# Patient Record
Sex: MALE | Race: WHITE | Employment: OTHER
[De-identification: names, ages, dates, MRNs, and addresses within clinical notes are randomized per-mention and may not be internally consistent; named-entity substitution may affect disease eponyms.]

---

## 2017-01-06 ENCOUNTER — OFFICE VISIT (OUTPATIENT)
Dept: INTERNAL MEDICINE | Facility: CLINIC | Age: 82
End: 2017-01-06

## 2017-01-06 VITALS
OXYGEN SATURATION: 98 % | TEMPERATURE: 98 F | HEART RATE: 63 BPM | SYSTOLIC BLOOD PRESSURE: 140 MMHG | RESPIRATION RATE: 16 BRPM | DIASTOLIC BLOOD PRESSURE: 60 MMHG | HEIGHT: 70 IN | WEIGHT: 218.6 LBS | BODY MASS INDEX: 31.3 KG/M2

## 2017-01-06 DIAGNOSIS — E78.5 HYPERLIPIDEMIA, UNSPECIFIED HYPERLIPIDEMIA TYPE: ICD-10-CM

## 2017-01-06 DIAGNOSIS — M15.9 GENERALIZED OA: ICD-10-CM

## 2017-01-06 DIAGNOSIS — G89.4 CHRONIC PAIN SYNDROME: ICD-10-CM

## 2017-01-06 DIAGNOSIS — I10 ESSENTIAL HYPERTENSION: Primary | ICD-10-CM

## 2017-01-06 DIAGNOSIS — I25.10 CORONARY ARTERY DISEASE INVOLVING NATIVE CORONARY ARTERY OF NATIVE HEART WITHOUT ANGINA PECTORIS: ICD-10-CM

## 2017-01-06 PROCEDURE — 99214 OFFICE O/P EST MOD 30 MIN: CPT | Performed by: FAMILY MEDICINE

## 2017-01-06 RX ORDER — CLOPIDOGREL BISULFATE 75 MG/1
75 TABLET ORAL DAILY
COMMUNITY

## 2017-01-06 ASSESSMENT — ENCOUNTER SYMPTOMS
ALLERGIC/IMMUNOLOGIC NEGATIVE: 1
RESPIRATORY NEGATIVE: 1
EYES NEGATIVE: 1

## 2017-01-25 ENCOUNTER — OFFICE VISIT (OUTPATIENT)
Dept: GASTROENTEROLOGY | Facility: CLINIC | Age: 82
End: 2017-01-25

## 2017-01-25 VITALS
SYSTOLIC BLOOD PRESSURE: 147 MMHG | HEIGHT: 71 IN | OXYGEN SATURATION: 94 % | RESPIRATION RATE: 14 BRPM | TEMPERATURE: 97.5 F | WEIGHT: 218.8 LBS | HEART RATE: 62 BPM | BODY MASS INDEX: 30.63 KG/M2 | DIASTOLIC BLOOD PRESSURE: 67 MMHG

## 2017-01-25 DIAGNOSIS — K22.719 BARRETT'S ESOPHAGUS WITH DYSPLASIA: ICD-10-CM

## 2017-01-25 DIAGNOSIS — K27.9 PUD (PEPTIC ULCER DISEASE): Primary | ICD-10-CM

## 2017-01-25 DIAGNOSIS — E66.01 MORBID OBESITY, UNSPECIFIED OBESITY TYPE (HCC): ICD-10-CM

## 2017-01-25 PROBLEM — K22.70 BARRETT'S ESOPHAGUS: Status: ACTIVE | Noted: 2017-01-25

## 2017-01-25 PROCEDURE — 99214 OFFICE O/P EST MOD 30 MIN: CPT | Performed by: INTERNAL MEDICINE

## 2017-01-25 PROCEDURE — 4040F PNEUMOC VAC/ADMIN/RCVD: CPT | Performed by: INTERNAL MEDICINE

## 2017-01-25 PROCEDURE — G8419 CALC BMI OUT NRM PARAM NOF/U: HCPCS | Performed by: INTERNAL MEDICINE

## 2017-01-25 PROCEDURE — G8427 DOCREV CUR MEDS BY ELIG CLIN: HCPCS | Performed by: INTERNAL MEDICINE

## 2017-01-25 PROCEDURE — 1123F ACP DISCUSS/DSCN MKR DOCD: CPT | Performed by: INTERNAL MEDICINE

## 2017-01-25 PROCEDURE — G8484 FLU IMMUNIZE NO ADMIN: HCPCS | Performed by: INTERNAL MEDICINE

## 2017-01-25 PROCEDURE — 1036F TOBACCO NON-USER: CPT | Performed by: INTERNAL MEDICINE

## 2017-01-25 PROCEDURE — G8598 ASA/ANTIPLAT THER USED: HCPCS | Performed by: INTERNAL MEDICINE

## 2017-01-25 ASSESSMENT — ENCOUNTER SYMPTOMS
VOMITING: 0
BLOOD IN STOOL: 0
CONSTIPATION: 0
NAUSEA: 0
ANAL BLEEDING: 0
ABDOMINAL DISTENTION: 0
ABDOMINAL PAIN: 1
DIARRHEA: 0
SHORTNESS OF BREATH: 1
BACK PAIN: 1
RECTAL PAIN: 0

## 2017-03-03 RX ORDER — GABAPENTIN 100 MG/1
CAPSULE ORAL
Qty: 90 CAPSULE | Refills: 2 | Status: SHIPPED | OUTPATIENT
Start: 2017-03-03 | End: 2017-07-24 | Stop reason: ALTCHOICE

## 2017-04-11 ENCOUNTER — HOSPITAL ENCOUNTER (OUTPATIENT)
Age: 82
Discharge: HOME | End: 2017-04-11
Payer: MEDICARE

## 2017-04-21 ENCOUNTER — OFFICE VISIT (OUTPATIENT)
Dept: INTERNAL MEDICINE CLINIC | Age: 82
End: 2017-04-21
Payer: MEDICARE

## 2017-04-21 VITALS
DIASTOLIC BLOOD PRESSURE: 64 MMHG | HEART RATE: 63 BPM | WEIGHT: 227.6 LBS | OXYGEN SATURATION: 93 % | TEMPERATURE: 98.6 F | SYSTOLIC BLOOD PRESSURE: 140 MMHG | BODY MASS INDEX: 31.86 KG/M2 | HEIGHT: 71 IN | RESPIRATION RATE: 16 BRPM

## 2017-04-21 DIAGNOSIS — I10 ESSENTIAL HYPERTENSION: ICD-10-CM

## 2017-04-21 DIAGNOSIS — M51.26 LUMBAR DISCOGENIC PAIN SYNDROME: ICD-10-CM

## 2017-04-21 DIAGNOSIS — M19.042 PRIMARY OSTEOARTHRITIS OF BOTH HANDS: ICD-10-CM

## 2017-04-21 DIAGNOSIS — M54.10 RADICULOPATHY AFFECTING UPPER EXTREMITY: ICD-10-CM

## 2017-04-21 DIAGNOSIS — M96.1 POSTLAMINECTOMY SYNDROME: ICD-10-CM

## 2017-04-21 DIAGNOSIS — M54.2 DISCOGENIC CERVICAL PAIN: Primary | ICD-10-CM

## 2017-04-21 DIAGNOSIS — M19.041 PRIMARY OSTEOARTHRITIS OF BOTH HANDS: ICD-10-CM

## 2017-04-21 DIAGNOSIS — N18.31 CHRONIC KIDNEY DISEASE (CKD) STAGE G3A/A1, MODERATELY DECREASED GLOMERULAR FILTRATION RATE (GFR) BETWEEN 45-59 ML/MIN/1.73 SQUARE METER AND ALBUMINURIA CREATININE RATIO LESS THAN 30 MG/G (HCC): ICD-10-CM

## 2017-04-21 DIAGNOSIS — K27.9 PUD (PEPTIC ULCER DISEASE): ICD-10-CM

## 2017-04-21 DIAGNOSIS — D52.8 OTHER FOLATE DEFICIENCY ANEMIAS: ICD-10-CM

## 2017-04-21 PROCEDURE — 1036F TOBACCO NON-USER: CPT | Performed by: FAMILY MEDICINE

## 2017-04-21 PROCEDURE — G8427 DOCREV CUR MEDS BY ELIG CLIN: HCPCS | Performed by: FAMILY MEDICINE

## 2017-04-21 PROCEDURE — G8417 CALC BMI ABV UP PARAM F/U: HCPCS | Performed by: FAMILY MEDICINE

## 2017-04-21 PROCEDURE — G8598 ASA/ANTIPLAT THER USED: HCPCS | Performed by: FAMILY MEDICINE

## 2017-04-21 PROCEDURE — 4040F PNEUMOC VAC/ADMIN/RCVD: CPT | Performed by: FAMILY MEDICINE

## 2017-04-21 PROCEDURE — 99214 OFFICE O/P EST MOD 30 MIN: CPT | Performed by: FAMILY MEDICINE

## 2017-04-21 PROCEDURE — 1123F ACP DISCUSS/DSCN MKR DOCD: CPT | Performed by: FAMILY MEDICINE

## 2017-04-21 ASSESSMENT — PATIENT HEALTH QUESTIONNAIRE - PHQ9
1. LITTLE INTEREST OR PLEASURE IN DOING THINGS: 0
SUM OF ALL RESPONSES TO PHQ QUESTIONS 1-9: 0
SUM OF ALL RESPONSES TO PHQ9 QUESTIONS 1 & 2: 0
2. FEELING DOWN, DEPRESSED OR HOPELESS: 0

## 2017-04-21 ASSESSMENT — ENCOUNTER SYMPTOMS
ALLERGIC/IMMUNOLOGIC NEGATIVE: 1
EYES NEGATIVE: 1
RESPIRATORY NEGATIVE: 1

## 2017-04-25 ENCOUNTER — HOSPITAL ENCOUNTER (OUTPATIENT)
Age: 82
Discharge: HOME | End: 2017-04-25
Payer: MEDICARE

## 2017-07-03 RX ORDER — OMEPRAZOLE 20 MG/1
20 CAPSULE, DELAYED RELEASE ORAL DAILY
Qty: 90 CAPSULE | Refills: 0 | Status: SHIPPED | OUTPATIENT
Start: 2017-07-03 | End: 2017-10-02 | Stop reason: SDUPTHER

## 2017-07-12 ENCOUNTER — HOSPITAL ENCOUNTER (EMERGENCY)
Age: 82
Discharge: HOME | End: 2017-07-12
Attending: EMERGENCY MEDICINE
Payer: MEDICARE

## 2017-07-12 VITALS
HEART RATE: 61 BPM | WEIGHT: 224 LBS | OXYGEN SATURATION: 96 % | TEMPERATURE: 98.1 F | DIASTOLIC BLOOD PRESSURE: 60 MMHG | BODY MASS INDEX: 32.07 KG/M2 | HEIGHT: 70 IN | RESPIRATION RATE: 14 BRPM | SYSTOLIC BLOOD PRESSURE: 143 MMHG

## 2017-07-12 DIAGNOSIS — H72.92 TYMPANIC MEMBRANE PERFORATION, LEFT: Primary | ICD-10-CM

## 2017-07-12 PROCEDURE — 6370000000 HC RX 637 (ALT 250 FOR IP): Performed by: EMERGENCY MEDICINE

## 2017-07-12 PROCEDURE — 99282 EMERGENCY DEPT VISIT SF MDM: CPT

## 2017-07-12 RX ORDER — HYDROCODONE BITARTRATE AND ACETAMINOPHEN 5; 325 MG/1; MG/1
1 TABLET ORAL ONCE
Status: COMPLETED | OUTPATIENT
Start: 2017-07-12 | End: 2017-07-12

## 2017-07-12 RX ORDER — CLINDAMYCIN HYDROCHLORIDE 300 MG/1
300 CAPSULE ORAL 3 TIMES DAILY
Qty: 21 CAPSULE | Refills: 0 | Status: SHIPPED | OUTPATIENT
Start: 2017-07-12 | End: 2017-07-19

## 2017-07-12 RX ORDER — HYDROCODONE BITARTRATE AND ACETAMINOPHEN 5; 325 MG/1; MG/1
1 TABLET ORAL EVERY 6 HOURS PRN
Qty: 20 TABLET | Refills: 0 | Status: SHIPPED | OUTPATIENT
Start: 2017-07-12 | End: 2017-07-19

## 2017-07-12 RX ORDER — CLINDAMYCIN HYDROCHLORIDE 150 MG/1
300 CAPSULE ORAL ONCE
Status: COMPLETED | OUTPATIENT
Start: 2017-07-12 | End: 2017-07-12

## 2017-07-12 RX ADMIN — CLINDAMYCIN HYDROCHLORIDE 300 MG: 150 CAPSULE ORAL at 04:17

## 2017-07-12 RX ADMIN — HYDROCODONE BITARTRATE AND ACETAMINOPHEN 1 TABLET: 5; 325 TABLET ORAL at 04:17

## 2017-07-12 ASSESSMENT — PAIN DESCRIPTION - LOCATION: LOCATION: EAR

## 2017-07-12 ASSESSMENT — PAIN SCALES - GENERAL: PAINLEVEL_OUTOF10: 3

## 2017-07-12 ASSESSMENT — PAIN DESCRIPTION - ORIENTATION: ORIENTATION: RIGHT

## 2017-07-12 ASSESSMENT — PAIN DESCRIPTION - PAIN TYPE: TYPE: ACUTE PAIN

## 2017-07-13 ENCOUNTER — CARE COORDINATION (OUTPATIENT)
Dept: INTERNAL MEDICINE CLINIC | Age: 82
End: 2017-07-13

## 2017-07-24 ENCOUNTER — OFFICE VISIT (OUTPATIENT)
Dept: INTERNAL MEDICINE CLINIC | Age: 82
End: 2017-07-24
Payer: MEDICARE

## 2017-07-24 VITALS
HEIGHT: 70 IN | OXYGEN SATURATION: 98 % | SYSTOLIC BLOOD PRESSURE: 130 MMHG | TEMPERATURE: 98 F | BODY MASS INDEX: 32.18 KG/M2 | DIASTOLIC BLOOD PRESSURE: 70 MMHG | HEART RATE: 75 BPM | WEIGHT: 224.8 LBS | RESPIRATION RATE: 17 BRPM

## 2017-07-24 DIAGNOSIS — E78.49 OTHER HYPERLIPIDEMIA: ICD-10-CM

## 2017-07-24 DIAGNOSIS — H61.22 HEARING LOSS DUE TO CERUMEN IMPACTION, LEFT: ICD-10-CM

## 2017-07-24 DIAGNOSIS — K22.719 BARRETT'S ESOPHAGUS WITH DYSPLASIA: ICD-10-CM

## 2017-07-24 DIAGNOSIS — H61.102: ICD-10-CM

## 2017-07-24 DIAGNOSIS — N18.31 CHRONIC KIDNEY DISEASE (CKD) STAGE G3A/A1, MODERATELY DECREASED GLOMERULAR FILTRATION RATE (GFR) BETWEEN 45-59 ML/MIN/1.73 SQUARE METER AND ALBUMINURIA CREATININE RATIO LESS THAN 30 MG/G (HCC): ICD-10-CM

## 2017-07-24 DIAGNOSIS — M50.20 CERVICAL DISCOGENIC PAIN SYNDROME: ICD-10-CM

## 2017-07-24 DIAGNOSIS — I25.10 CORONARY ARTERY DISEASE INVOLVING NATIVE CORONARY ARTERY OF NATIVE HEART WITHOUT ANGINA PECTORIS: ICD-10-CM

## 2017-07-24 DIAGNOSIS — I10 ESSENTIAL HYPERTENSION: Primary | ICD-10-CM

## 2017-07-24 PROBLEM — M50.30 CERVICAL DISCOGENIC PAIN SYNDROME: Status: ACTIVE | Noted: 2017-07-24

## 2017-07-24 PROCEDURE — G8427 DOCREV CUR MEDS BY ELIG CLIN: HCPCS | Performed by: FAMILY MEDICINE

## 2017-07-24 PROCEDURE — 1036F TOBACCO NON-USER: CPT | Performed by: FAMILY MEDICINE

## 2017-07-24 PROCEDURE — 1123F ACP DISCUSS/DSCN MKR DOCD: CPT | Performed by: FAMILY MEDICINE

## 2017-07-24 PROCEDURE — 4040F PNEUMOC VAC/ADMIN/RCVD: CPT | Performed by: FAMILY MEDICINE

## 2017-07-24 PROCEDURE — 99214 OFFICE O/P EST MOD 30 MIN: CPT | Performed by: FAMILY MEDICINE

## 2017-07-24 PROCEDURE — G8417 CALC BMI ABV UP PARAM F/U: HCPCS | Performed by: FAMILY MEDICINE

## 2017-07-24 PROCEDURE — G8598 ASA/ANTIPLAT THER USED: HCPCS | Performed by: FAMILY MEDICINE

## 2017-07-24 RX ORDER — GABAPENTIN 300 MG/1
300 CAPSULE ORAL 2 TIMES DAILY
Qty: 180 CAPSULE | Refills: 0 | Status: SHIPPED | OUTPATIENT
Start: 2017-07-24 | End: 2018-02-01 | Stop reason: SDUPTHER

## 2017-07-24 RX ORDER — GABAPENTIN 100 MG/1
100 CAPSULE ORAL 2 TIMES DAILY
Qty: 180 CAPSULE | Refills: 0 | Status: SHIPPED | OUTPATIENT
Start: 2017-07-24 | End: 2017-07-24 | Stop reason: ALTCHOICE

## 2017-07-24 ASSESSMENT — ENCOUNTER SYMPTOMS
RESPIRATORY NEGATIVE: 1
EYES NEGATIVE: 1
ALLERGIC/IMMUNOLOGIC NEGATIVE: 1

## 2017-07-27 ENCOUNTER — HOSPITAL ENCOUNTER (OUTPATIENT)
Dept: GENERAL RADIOLOGY | Age: 82
Discharge: HOME | End: 2017-07-27
Payer: MEDICARE

## 2017-07-27 ENCOUNTER — HOSPITAL ENCOUNTER (OUTPATIENT)
Age: 82
Discharge: HOME | End: 2017-07-27
Payer: MEDICARE

## 2017-07-27 DIAGNOSIS — M51.26 LUMBAR DISCOGENIC PAIN SYNDROME: ICD-10-CM

## 2017-07-27 PROCEDURE — 72050 X-RAY EXAM NECK SPINE 4/5VWS: CPT

## 2017-09-08 ENCOUNTER — HOSPITAL ENCOUNTER (OUTPATIENT)
Age: 82
Discharge: HOME | End: 2017-09-08
Payer: MEDICARE

## 2017-09-08 LAB
ABSOLUTE EOS #: 0.3 K/UL (ref 0–0.4)
ABSOLUTE LYMPH #: 2.1 K/UL (ref 1–4.8)
ABSOLUTE MONO #: 0.9 K/UL (ref 0.2–0.8)
ALBUMIN SERPL-MCNC: 4.1 G/DL (ref 3.5–5.2)
ALBUMIN/GLOBULIN RATIO: ABNORMAL (ref 1–2.5)
ALP BLD-CCNC: 62 U/L (ref 40–129)
ALT SERPL-CCNC: 14 U/L (ref 5–41)
ANION GAP SERPL CALCULATED.3IONS-SCNC: 12 MMOL/L (ref 9–17)
AST SERPL-CCNC: 26 U/L
BASOPHILS # BLD: 1 %
BASOPHILS ABSOLUTE: 0.1 K/UL (ref 0–0.2)
BILIRUB SERPL-MCNC: 0.61 MG/DL (ref 0.3–1.2)
BUN BLDV-MCNC: 23 MG/DL (ref 8–23)
BUN/CREAT BLD: 22 (ref 9–20)
CALCIUM SERPL-MCNC: 8.9 MG/DL (ref 8.6–10.4)
CHLORIDE BLD-SCNC: 102 MMOL/L (ref 98–107)
CHOLESTEROL, FASTING: 138 MG/DL
CHOLESTEROL/HDL RATIO: 3.2
CO2: 27 MMOL/L (ref 20–31)
CREAT SERPL-MCNC: 1.05 MG/DL (ref 0.7–1.2)
DIFFERENTIAL TYPE: ABNORMAL
EOSINOPHILS RELATIVE PERCENT: 3 %
GFR AFRICAN AMERICAN: >60 ML/MIN
GFR NON-AFRICAN AMERICAN: >60 ML/MIN
GFR SERPL CREATININE-BSD FRML MDRD: ABNORMAL ML/MIN/{1.73_M2}
GFR SERPL CREATININE-BSD FRML MDRD: ABNORMAL ML/MIN/{1.73_M2}
GLUCOSE FASTING: 99 MG/DL (ref 70–99)
HCT VFR BLD CALC: 41.4 % (ref 41–53)
HDLC SERPL-MCNC: 43 MG/DL
HEMOGLOBIN: 13.6 G/DL (ref 13.5–17.5)
LDL CHOLESTEROL: 68 MG/DL (ref 0–130)
LYMPHOCYTES # BLD: 25 %
MCH RBC QN AUTO: 30.6 PG (ref 26–34)
MCHC RBC AUTO-ENTMCNC: 32.8 G/DL (ref 31–37)
MCV RBC AUTO: 93.3 FL (ref 80–100)
MONOCYTES # BLD: 11 %
PDW BLD-RTO: 14.1 % (ref 11.5–14.5)
PLATELET # BLD: 230 K/UL (ref 130–400)
PLATELET ESTIMATE: ABNORMAL
PMV BLD AUTO: 7.1 FL (ref 6–12)
POTASSIUM SERPL-SCNC: 5.3 MMOL/L (ref 3.7–5.3)
RBC # BLD: 4.43 M/UL (ref 4.5–5.9)
RBC # BLD: ABNORMAL 10*6/UL
SEG NEUTROPHILS: 60 %
SEGMENTED NEUTROPHILS ABSOLUTE COUNT: 5 K/UL (ref 1.8–7.7)
SODIUM BLD-SCNC: 141 MMOL/L (ref 135–144)
TOTAL CK: 116 U/L (ref 39–308)
TOTAL PROTEIN: 7.1 G/DL (ref 6.4–8.3)
TRIGLYCERIDE, FASTING: 136 MG/DL
VLDLC SERPL CALC-MCNC: NORMAL MG/DL (ref 1–30)
WBC # BLD: 8.4 K/UL (ref 3.5–11)
WBC # BLD: ABNORMAL 10*3/UL

## 2017-09-08 PROCEDURE — 80061 LIPID PANEL: CPT

## 2017-09-08 PROCEDURE — 85025 COMPLETE CBC W/AUTO DIFF WBC: CPT

## 2017-09-08 PROCEDURE — 36415 COLL VENOUS BLD VENIPUNCTURE: CPT

## 2017-09-08 PROCEDURE — 80053 COMPREHEN METABOLIC PANEL: CPT

## 2017-09-08 PROCEDURE — 82550 ASSAY OF CK (CPK): CPT

## 2017-09-27 ENCOUNTER — OFFICE VISIT (OUTPATIENT)
Dept: GASTROENTEROLOGY | Age: 82
End: 2017-09-27
Payer: MEDICARE

## 2017-09-27 VITALS
OXYGEN SATURATION: 98 % | RESPIRATION RATE: 14 BRPM | TEMPERATURE: 98.1 F | WEIGHT: 230.2 LBS | SYSTOLIC BLOOD PRESSURE: 144 MMHG | BODY MASS INDEX: 32.23 KG/M2 | HEART RATE: 78 BPM | HEIGHT: 71 IN | DIASTOLIC BLOOD PRESSURE: 61 MMHG

## 2017-09-27 DIAGNOSIS — R19.7 DIARRHEA, UNSPECIFIED TYPE: ICD-10-CM

## 2017-09-27 DIAGNOSIS — K22.70 BARRETT'S ESOPHAGUS WITHOUT DYSPLASIA: Primary | ICD-10-CM

## 2017-09-27 DIAGNOSIS — K27.9 PUD (PEPTIC ULCER DISEASE): ICD-10-CM

## 2017-09-27 PROCEDURE — G8598 ASA/ANTIPLAT THER USED: HCPCS | Performed by: INTERNAL MEDICINE

## 2017-09-27 PROCEDURE — 1036F TOBACCO NON-USER: CPT | Performed by: INTERNAL MEDICINE

## 2017-09-27 PROCEDURE — 1123F ACP DISCUSS/DSCN MKR DOCD: CPT | Performed by: INTERNAL MEDICINE

## 2017-09-27 PROCEDURE — G8427 DOCREV CUR MEDS BY ELIG CLIN: HCPCS | Performed by: INTERNAL MEDICINE

## 2017-09-27 PROCEDURE — 99214 OFFICE O/P EST MOD 30 MIN: CPT | Performed by: INTERNAL MEDICINE

## 2017-09-27 PROCEDURE — 4040F PNEUMOC VAC/ADMIN/RCVD: CPT | Performed by: INTERNAL MEDICINE

## 2017-09-27 PROCEDURE — G8417 CALC BMI ABV UP PARAM F/U: HCPCS | Performed by: INTERNAL MEDICINE

## 2017-09-27 ASSESSMENT — ENCOUNTER SYMPTOMS
CONSTIPATION: 0
BLOOD IN STOOL: 0
BACK PAIN: 1
ABDOMINAL DISTENTION: 0
DIARRHEA: 1
RECTAL PAIN: 0
VOMITING: 0
ANAL BLEEDING: 0
SHORTNESS OF BREATH: 1
NAUSEA: 0
ABDOMINAL PAIN: 0

## 2017-10-03 RX ORDER — OMEPRAZOLE 20 MG/1
CAPSULE, DELAYED RELEASE ORAL
Qty: 90 CAPSULE | Refills: 0 | Status: SHIPPED | OUTPATIENT
Start: 2017-10-03 | End: 2018-01-06 | Stop reason: SDUPTHER

## 2017-10-14 ENCOUNTER — APPOINTMENT (OUTPATIENT)
Dept: GENERAL RADIOLOGY | Age: 82
End: 2017-10-14
Payer: MEDICARE

## 2017-10-14 ENCOUNTER — HOSPITAL ENCOUNTER (EMERGENCY)
Age: 82
Discharge: HOME | End: 2017-10-14
Attending: EMERGENCY MEDICINE
Payer: MEDICARE

## 2017-10-14 VITALS
TEMPERATURE: 98.2 F | WEIGHT: 226 LBS | HEIGHT: 70 IN | DIASTOLIC BLOOD PRESSURE: 73 MMHG | OXYGEN SATURATION: 98 % | SYSTOLIC BLOOD PRESSURE: 152 MMHG | BODY MASS INDEX: 32.35 KG/M2 | RESPIRATION RATE: 18 BRPM | HEART RATE: 51 BPM

## 2017-10-14 DIAGNOSIS — S39.012A STRAIN OF LUMBAR REGION, INITIAL ENCOUNTER: Primary | ICD-10-CM

## 2017-10-14 PROCEDURE — 99283 EMERGENCY DEPT VISIT LOW MDM: CPT

## 2017-10-14 PROCEDURE — 72100 X-RAY EXAM L-S SPINE 2/3 VWS: CPT

## 2017-10-14 RX ORDER — HYDROCODONE BITARTRATE AND ACETAMINOPHEN 5; 325 MG/1; MG/1
1 TABLET ORAL EVERY 6 HOURS PRN
Qty: 20 TABLET | Refills: 0 | Status: SHIPPED | OUTPATIENT
Start: 2017-10-14 | End: 2017-12-15

## 2017-10-14 RX ORDER — IBUPROFEN 600 MG/1
600 TABLET ORAL EVERY 8 HOURS PRN
Qty: 15 TABLET | Refills: 0 | Status: SHIPPED | OUTPATIENT
Start: 2017-10-14 | End: 2018-06-06

## 2017-10-14 RX ORDER — TIZANIDINE 4 MG/1
4 TABLET ORAL EVERY 8 HOURS PRN
Qty: 20 TABLET | Refills: 0 | Status: SHIPPED | OUTPATIENT
Start: 2017-10-14 | End: 2017-11-08

## 2017-10-14 ASSESSMENT — PAIN SCALES - GENERAL: PAINLEVEL_OUTOF10: 6

## 2017-10-14 ASSESSMENT — ENCOUNTER SYMPTOMS
COLOR CHANGE: 0
BACK PAIN: 1
DIARRHEA: 0
ABDOMINAL PAIN: 0
VOMITING: 0
NAUSEA: 0

## 2017-10-14 ASSESSMENT — PAIN DESCRIPTION - PAIN TYPE: TYPE: ACUTE PAIN

## 2017-10-14 ASSESSMENT — PAIN DESCRIPTION - DESCRIPTORS: DESCRIPTORS: ACHING

## 2017-10-14 NOTE — ED PROVIDER NOTES
Attending Supervisory Note/Shared Visit   I was present in the ED during this patient's evaluation and management by the Advance Practice Provider and was available to address any concerns about their medical management.      Jhoan Lyon MD  Attending Emergency Physician      Jhoan Lyon MD  10/14/17 2623

## 2017-10-14 NOTE — ED PROVIDER NOTES
[facility] ED  eMERGENCY dEPARTMENT eNCOUnter      Pt Name: Andrae Fernandez  MRN: [id number]  [place] 1935  Date of evaluation: 10/14/2017  Provider: Richard Ward NP    CHIEF COMPLAINT       Chief Complaint   Patient presents with    Back Pain         HISTORY OF PRESENT ILLNESS  (Location/Symptom, Timing/Onset, Context/Setting, Quality, Duration, Modifying Factors, Severity.)   Andrae Fernandez is a [de-identified] y.o. male who presents to the emergency department via private auto for left lower back pain. Onset was 2 weeks ago after carrying his cat into the vet. Also reports the pain increased after carrying groceries last week. Denies fever, chills, N/T to his extremities, change in bowel and/or bladder control, urinary symptoms. The pain increases with movement. Rates his pain 6/10 at this time. He has been taking norco without relief. Nursing Notes were reviewed. ALLERGIES     Pcn [penicillins] and Pineapple    CURRENT MEDICATIONS       Previous Medications    ASPIRIN 81 MG TABLET    Take 81 mg by mouth daily    ATORVASTATIN (LIPITOR) 20 MG TABLET        CLOPIDOGREL (PLAVIX) 75 MG TABLET    Take 75 mg by mouth daily    GABAPENTIN (NEURONTIN) 300 MG CAPSULE    Take 1 capsule by mouth 2 times daily    ISOSORBIDE MONONITRATE (IMDUR) 30 MG CR TABLET    Take 1 tablet by mouth daily.     METOPROLOL (TOPROL-XL) 50 MG XL TABLET    Take 50 mg by mouth daily     OMEPRAZOLE (PRILOSEC) 20 MG DELAYED RELEASE CAPSULE    take 1 capsule by mouth once daily    SPIRONOLACTONE (ALDACTONE) 50 MG TABLET    25 mg        PAST MEDICAL HISTORY         Diagnosis Date    Acute renal failure (ARF) ([facility] [address] 1/16/2016    Bleeding [facility] brain [facility])     after fall    Coronary artery disease involving native coronary artery of native heart without angina pectoris 1/28/2016    Gastritis     Heart attack     History of blood transfusion     Hyperlipidemia     Hypertension     Osteoarthritis     Rectal bleeding SURGICAL HISTORY           Procedure Laterality Date    APPENDECTOMY      CORONARY ANGIOPLASTY WITH STENT PLACEMENT      HAND SURGERY Left     HERNIA REPAIR      OTHER SURGICAL HISTORY      scalp surgery    UPPER GASTROINTESTINAL ENDOSCOPY  01/18/16    UPPER GASTROINTESTINAL ENDOSCOPY  1/18/2016    large and deep ulcer duodenal bulb, minimal bleeding    UPPER GASTROINTESTINAL ENDOSCOPY  3/16/16    ,ild antritis, signs of healed ulcer scar noted         FAMILY HISTORY     History reviewed. No pertinent family history. No family status information on file. SOCIAL HISTORY      reports that he has never smoked. He has never used smokeless tobacco. He reports that he does not drink alcohol or use drugs. REVIEW OF SYSTEMS    (2-9 systems for level 4, 10 or more for level 5)     Review of Systems   Constitutional: Negative for chills, diaphoresis, fatigue and fever. Gastrointestinal: Negative for abdominal pain, diarrhea, nausea and vomiting. Genitourinary: Negative for dysuria, flank pain, frequency, hematuria and urgency. Musculoskeletal: Positive for arthralgias, back pain and myalgias. Negative for neck pain. Skin: Negative for color change, rash and wound. Neurological: Negative for numbness. Except as noted above the remainder of the review of systems was reviewed and negative. PHYSICAL EXAM    (up to 7 for level 4, 8 or more for level 5)     ED Triage Vitals [10/14/17 1045]   BP Temp Temp src Pulse Resp SpO2 Height Weight   (!) 152/73 98.2 °F (36.8 °C) -- 51 18 98 % 5' 10\" (1.778 m) 226 lb (102.5 kg)     Physical Exam   Constitutional: He is oriented to person, place, and time. He appears well-developed and well-nourished. No distress. Eyes: Conjunctivae are normal.   Cardiovascular: Normal rate, regular rhythm and normal heart sounds. Pulmonary/Chest: Effort normal and breath sounds normal. No respiratory distress. He has no wheezes. He has no rales. Musculoskeletal:        Cervical back: He exhibits no tenderness, no bony tenderness and no pain. Thoracic back: He exhibits no tenderness, no bony tenderness and no pain. Lumbar back: He exhibits decreased range of motion, tenderness (left supraspinal), pain and spasm. He exhibits no bony tenderness. Neurological: He is alert and oriented to person, place, and time. Skin: Skin is warm and dry. No rash noted. He is not diaphoretic. Psychiatric: He has a normal mood and affect. His behavior is normal.   Vitals reviewed. DIAGNOSTIC RESULTS     RADIOLOGY:   Non-plain film images such as CT, Ultrasound and MRI are read by the radiologist. Plain radiographic images are visualized and preliminarily interpreted by the emergency physician with the below findings:    Interpretation per the Radiologist below, if available at the time of this note:    Xr Lumbar Spine (2-3 Views)    Result Date: 10/14/2017  EXAMINATION: 3 VIEWS OF THE LUMBAR SPINE 10/14/2017 11:08 am COMPARISON: None. HISTORY: ORDERING SYSTEM PROVIDED HISTORY: pain TECHNOLOGIST PROVIDED HISTORY: Reason for exam:->pain Ordering Physician Provided Reason for Exam: left hip and leg pain Acuity: Chronic Type of Exam: Unknown FINDINGS: There is no lumbar compression fracture or vertebral subluxation. Diffuse lumbar spondylosis is present. Degenerative disc space narrowing at all lumbar levels with formation of marginal osteophytes. Severe degenerative disc space narrowing at level of L4-L5 and L5-S1. Pedicles are unremarkable. Paravertebral soft tissues are within normal limits. There is evidence of a bilateral hip arthroplasty. The arteriosclerotic calcified plaques of the abdominal aorta. Lumbar spondylosis and multilevel degenerative disc disease.          EMERGENCY DEPARTMENT COURSE and DIFFERENTIAL DIAGNOSIS/MDM:   Vitals:    Vitals:    10/14/17 1045   BP: (!) 152/73   Pulse: 51   Resp: 18   Temp: 98.2 °F (36.8 °C)   SpO2:

## 2017-10-14 NOTE — ED NOTES
Pt to ED with lower back pain states had outpatient x-ray pcp never called him with results, pt to ED to fine out why he still has back pain.      Karen Contreras RN  10/14/17 5247

## 2017-10-23 ENCOUNTER — HOSPITAL ENCOUNTER (EMERGENCY)
Age: 82
Discharge: LEFT BEFORE DISCHARGE | End: 2017-10-23
Payer: MEDICARE

## 2017-10-23 VITALS
WEIGHT: 227 LBS | DIASTOLIC BLOOD PRESSURE: 73 MMHG | BODY MASS INDEX: 32.5 KG/M2 | HEIGHT: 70 IN | TEMPERATURE: 98 F | SYSTOLIC BLOOD PRESSURE: 159 MMHG | HEART RATE: 69 BPM | RESPIRATION RATE: 18 BRPM | OXYGEN SATURATION: 97 %

## 2017-10-23 PROCEDURE — 4500000002 HC ER NO CHARGE

## 2017-10-23 ASSESSMENT — PAIN DESCRIPTION - ORIENTATION: ORIENTATION: LEFT;LOWER

## 2017-10-23 ASSESSMENT — PAIN DESCRIPTION - DESCRIPTORS: DESCRIPTORS: SHARP;SHOOTING;STABBING;ACHING

## 2017-10-23 ASSESSMENT — PAIN SCALES - GENERAL: PAINLEVEL_OUTOF10: 10

## 2017-10-23 ASSESSMENT — PAIN DESCRIPTION - LOCATION: LOCATION: BACK;HIP

## 2017-10-23 ASSESSMENT — PAIN DESCRIPTION - PAIN TYPE: TYPE: ACUTE PAIN

## 2017-10-25 ENCOUNTER — HOSPITAL ENCOUNTER (EMERGENCY)
Age: 82
Discharge: HOME | End: 2017-10-25
Attending: EMERGENCY MEDICINE
Payer: MEDICARE

## 2017-10-25 VITALS
RESPIRATION RATE: 16 BRPM | HEIGHT: 70 IN | WEIGHT: 227 LBS | TEMPERATURE: 98.5 F | OXYGEN SATURATION: 95 % | HEART RATE: 66 BPM | DIASTOLIC BLOOD PRESSURE: 70 MMHG | SYSTOLIC BLOOD PRESSURE: 142 MMHG | BODY MASS INDEX: 32.5 KG/M2

## 2017-10-25 DIAGNOSIS — M54.32 SCIATICA OF LEFT SIDE: Primary | ICD-10-CM

## 2017-10-25 PROCEDURE — 96372 THER/PROPH/DIAG INJ SC/IM: CPT

## 2017-10-25 PROCEDURE — 99283 EMERGENCY DEPT VISIT LOW MDM: CPT

## 2017-10-25 PROCEDURE — 6360000002 HC RX W HCPCS: Performed by: NURSE PRACTITIONER

## 2017-10-25 PROCEDURE — 6370000000 HC RX 637 (ALT 250 FOR IP): Performed by: NURSE PRACTITIONER

## 2017-10-25 RX ORDER — KETOROLAC TROMETHAMINE 30 MG/ML
30 INJECTION, SOLUTION INTRAMUSCULAR; INTRAVENOUS ONCE
Status: COMPLETED | OUTPATIENT
Start: 2017-10-25 | End: 2017-10-25

## 2017-10-25 RX ORDER — METOPROLOL SUCCINATE 50 MG/1
50 TABLET, EXTENDED RELEASE ORAL DAILY
Status: DISCONTINUED | OUTPATIENT
Start: 2017-10-25 | End: 2017-10-25 | Stop reason: HOSPADM

## 2017-10-25 RX ADMIN — METOPROLOL SUCCINATE 50 MG: 50 TABLET, FILM COATED, EXTENDED RELEASE ORAL at 11:04

## 2017-10-25 RX ADMIN — KETOROLAC TROMETHAMINE 30 MG: 30 INJECTION, SOLUTION INTRAMUSCULAR at 10:13

## 2017-10-25 ASSESSMENT — PAIN SCALES - GENERAL
PAINLEVEL_OUTOF10: 7
PAINLEVEL_OUTOF10: 10

## 2017-10-25 ASSESSMENT — ENCOUNTER SYMPTOMS
GASTROINTESTINAL NEGATIVE: 1
RESPIRATORY NEGATIVE: 1
BACK PAIN: 1

## 2017-10-25 NOTE — ED NOTES
[facility] [place] EDWARDO Carroll, RN  10/25/17 0041

## 2017-10-25 NOTE — ED NOTES
ASSESSMENT:    presents to ED per pvt auto with wife with c/o lt low back/ post hip pain with radiation into lt buttock and post thigh. Has hx bilat hip replacements. ith last one done 15 years ago. Has had issues with pain to lt hip since surgery. Lately pain getting worse was in Ed last week and had LS spine x-rays done. Showing degenerative changes. Also havign C spine issues and 7/2017 had C-spine x-rays showing degenerative changes. Has been having numbness and tingling to fingerd of both hands with rt worse than lt. Main c/o today is lt hip pain. Pain is worse with getting up and down. Denies recent injury or strenuous activity.      Liliam Patel, RN  10/25/17 3009

## 2017-10-25 NOTE — ED PROVIDER NOTES
[facility] ED  eMERGENCY dEPARTMENT eNCOUnter      Pt Name: Carley Freeman  MRN: [id number]  [place] 1935  Date of evaluation: 10/25/2017  Provider: Edna Rodriguez NP    CHIEF COMPLAINT       Chief Complaint   Patient presents with    Hip Pain     left         HISTORY OF PRESENT ILLNESS  (Location/Symptom, Timing/Onset, Context/Setting, Quality, Duration, Modifying Factors, Severity.)   Carley Freeman is a [de-identified] y.o. male who presents to the emergency department  With complaints of left hip and posterior leg pain. This is the pain he has had for over 2 weeks. The pain is continued and his increased over the last couple of days. He is unsure what he is taking for pain. His wife states he stopped taking all of his medicines last Thursday. The medications including gabapentin. The patient states that he can't remember to take the medications however he has enough of the medications. His wife drove him to the hospital.  He states he has no pain when laying supine. Pain is aggravated by flexion at the hip and walking. He denies any recent falls and no direct trauma to the back. Nursing Notes were reviewed.     ALLERGIES     Pcn [penicillins] and Pineapple    CURRENT MEDICATIONS       Discharge Medication List as of 10/25/2017 10:34 AM      CONTINUE these medications which have NOT CHANGED    Details   tiZANidine (ZANAFLEX) 4 MG tablet Take 1 tablet by mouth every 8 hours as needed (back pain) WARNING:  May cause drowsiness.  May impair ability to operate vehicles or machinery.  Do not use in combination with alcohol., Disp-20 tablet, R-0Print      ibuprofen (ADVIL;MOTRIN) 600 MG tablet Take 1 tablet by mouth every 8 hours as needed for Pain, Disp-15 tablet, R-0Print      HYDROcodone-acetaminophen (NORCO) 5-325 MG per tablet Take 1 tablet by mouth every 6 hours as needed for Pain (WARNING:  May cause drowsiness.  May impair ability to operate vehicles or machinery.  Do not use in combination with alcohol.) ., Disp-20 tablet, R-0Print      omeprazole (PRILOSEC) 20 MG delayed release capsule take 1 capsule by mouth once daily, Disp-90 capsule, R-0Normal      gabapentin (NEURONTIN) 300 MG capsule Take 1 capsule by mouth 2 times daily, Disp-180 capsule, R-0Normal      aspirin 81 MG tablet Take 81 mg by mouth daily      clopidogrel (PLAVIX) 75 MG tablet Take 75 mg by mouth daily      atorvastatin (LIPITOR) 20 MG tablet Historical Med      spironolactone (ALDACTONE) 50 MG tablet 25 mg , R-0      metoprolol (TOPROL-XL) 50 MG XL tablet Take 50 mg by mouth daily Historical Med      isosorbide mononitrate (IMDUR) 30 MG CR tablet Take 1 tablet by mouth daily. , R-0             PAST MEDICAL HISTORY         Diagnosis Date    Acute renal failure (ARF) ([facility] [address] 1/16/2016    Bleeding in [facility])     after fall    Coronary artery disease involving native coronary artery of native heart without angina pectoris 1/28/2016    Gastritis     Heart attack     History of blood transfusion     Hyperlipidemia     Hypertension     Osteoarthritis     Rectal bleeding        SURGICAL HISTORY           Procedure Laterality Date    APPENDECTOMY      CORONARY ANGIOPLASTY WITH STENT PLACEMENT      HAND SURGERY Left     HERNIA REPAIR      OTHER SURGICAL HISTORY      scalp surgery    UPPER GASTROINTESTINAL ENDOSCOPY  01/18/16    UPPER GASTROINTESTINAL ENDOSCOPY  1/18/2016    large and deep ulcer duodenal bulb, minimal bleeding    UPPER GASTROINTESTINAL ENDOSCOPY  3/16/16    ,ild antritis, signs of healed ulcer scar noted         FAMILY HISTORY     History reviewed. No pertinent family history. No family status information on file. SOCIAL HISTORY      reports that he has never smoked. He has never used smokeless tobacco. He reports that he does not drink alcohol or use drugs. REVIEW OF SYSTEMS    (2-9 systems for level 4, 10 or more for level 5)     Review of Systems   Constitutional: Positive for activity change. Negative for chills and fever. Activity change due to pain in left hip   HENT: Negative. Respiratory: Negative. Cardiovascular: Negative. Gastrointestinal: Negative. Genitourinary: Negative. Musculoskeletal: Positive for arthralgias and back pain. Skin: Negative. Neurological: Negative for dizziness. Hematological: Negative. Except as noted above the remainder of the review of systems was reviewed and negative. PHYSICAL EXAM    (up to 7 for level 4, 8 or more for level 5)     ED Triage Vitals [10/25/17 0856]   BP Temp Temp Source Pulse Resp SpO2 Height Weight   (!) 170/72 98.5 °F (36.9 °C) Oral 77 18 98 % 5' 10\" (1.778 m) 227 lb (103 kg)     General Appearance:  Alert, cooperative, no distress, appears stated age. Head:  Normocephalic, without obvious abnormality, atraumatic. Eyes:  conjunctiva/corneas clear, EOM's intact. Sclera anicteric. ENT: Mucous membranes moist    Neck: Supple, symmetrical, trachea midline, no adenopathy. Lungs:   Respirations eupneic. Lungs CTA   Chest Wall:  Nontender   Heart:   Abdomen:     Extremities:              Skin:    Neurologic:  RRR  Soft, nontender   Tenderness palpated over the left gluteal area and the posterior upper thigh. He maintains good range of motion. Internal rotation produces increase in pain. No pain with flexion and external rotation at the hip. No radiculopathy with passive straight leg raise. Distal pulses 3+ . Normal sensation maintained. No rashes or lesions to exposed skin. Alert and oriented X 3. Pauses when giving answers. .   Motor grossly normal.  Speech clear. DIAGNOSTIC RESULTS           LABS:  Labs Reviewed - No data to display    All other labs were within normal range or not returned as of this dictation.     EMERGENCY DEPARTMENT COURSE and DIFFERENTIAL DIAGNOSIS/MDM:   Vitals:    Vitals:    10/25/17 0856 10/25/17 1041 10/25/17 1104   BP: (!) 170/72 (!) 142/70 (!)

## 2017-10-25 NOTE — ED PROVIDER NOTES
Attending Supervisory Note/Shared Visit   I have personally performed a face to face diagnostic evaluation on this patient. I have reviewed the mid-levels findings and agree. History and Exam by me shows an alert and oriented patient seen with extender I agree with the assessment treatment plan and disposition. Briefly, He is an 26-year-old male with acute on chronic hip pain. No new injuries. No neurologic deficits. No fevers. It seems that he has not been compliant with his medications due to forgetfulness which is likely the cause of his acute worsening of hip pain. We will treat with his home gabapentin and a dose of Toradol and discussed with his PCP concerning setting him up with resources to help him remember to take his medications at home.     (Please note that portions of this note were completed with a voice recognition program.  Efforts were made to edit the dictations but occasionally words are mis-transcribed.)    Billie Quiroz MD  Attending Emergency Physician        Billie Quiroz MD  10/25/17 7730

## 2017-10-26 ENCOUNTER — CARE COORDINATION (OUTPATIENT)
Dept: CARE COORDINATION | Age: 82
End: 2017-10-26

## 2017-10-27 ENCOUNTER — OFFICE VISIT (OUTPATIENT)
Dept: INTERNAL MEDICINE CLINIC | Age: 82
End: 2017-10-27
Payer: MEDICARE

## 2017-10-27 VITALS
RESPIRATION RATE: 16 BRPM | WEIGHT: 227.07 LBS | DIASTOLIC BLOOD PRESSURE: 72 MMHG | BODY MASS INDEX: 32.51 KG/M2 | HEIGHT: 70 IN | SYSTOLIC BLOOD PRESSURE: 138 MMHG | OXYGEN SATURATION: 97 % | HEART RATE: 61 BPM

## 2017-10-27 DIAGNOSIS — K21.9 GASTROESOPHAGEAL REFLUX DISEASE WITHOUT ESOPHAGITIS: ICD-10-CM

## 2017-10-27 DIAGNOSIS — M51.16 RADICULOPATHY DUE TO DISORDER OF INTERVERTEBRAL DISC OF LUMBAR SPINE: ICD-10-CM

## 2017-10-27 DIAGNOSIS — M48.00 SPINAL STENOSIS, MULTIPLE SITES IN SPINE: ICD-10-CM

## 2017-10-27 DIAGNOSIS — I10 ESSENTIAL HYPERTENSION: Primary | ICD-10-CM

## 2017-10-27 DIAGNOSIS — I25.10 CORONARY ARTERY DISEASE INVOLVING NATIVE CORONARY ARTERY OF NATIVE HEART WITHOUT ANGINA PECTORIS: ICD-10-CM

## 2017-10-27 DIAGNOSIS — E78.49 OTHER HYPERLIPIDEMIA: ICD-10-CM

## 2017-10-27 PROCEDURE — 99214 OFFICE O/P EST MOD 30 MIN: CPT | Performed by: FAMILY MEDICINE

## 2017-10-27 PROCEDURE — 1036F TOBACCO NON-USER: CPT | Performed by: FAMILY MEDICINE

## 2017-10-27 PROCEDURE — G8484 FLU IMMUNIZE NO ADMIN: HCPCS | Performed by: FAMILY MEDICINE

## 2017-10-27 PROCEDURE — G8427 DOCREV CUR MEDS BY ELIG CLIN: HCPCS | Performed by: FAMILY MEDICINE

## 2017-10-27 PROCEDURE — 4040F PNEUMOC VAC/ADMIN/RCVD: CPT | Performed by: FAMILY MEDICINE

## 2017-10-27 PROCEDURE — 1123F ACP DISCUSS/DSCN MKR DOCD: CPT | Performed by: FAMILY MEDICINE

## 2017-10-27 PROCEDURE — G8598 ASA/ANTIPLAT THER USED: HCPCS | Performed by: FAMILY MEDICINE

## 2017-10-27 PROCEDURE — G8417 CALC BMI ABV UP PARAM F/U: HCPCS | Performed by: FAMILY MEDICINE

## 2017-10-27 ASSESSMENT — ENCOUNTER SYMPTOMS
ALLERGIC/IMMUNOLOGIC NEGATIVE: 1
RESPIRATORY NEGATIVE: 1
GASTROINTESTINAL NEGATIVE: 1
BACK PAIN: 1
EYES NEGATIVE: 1

## 2017-10-27 NOTE — PROGRESS NOTES
Chronic Disease Visit Information    BP Readings from Last 3 Encounters:   10/27/17 110/60   10/25/17 (!) 142/70   10/23/17 (!) 159/73          LDL Cholesterol (mg/dL)   Date Value   09/08/2017 68     HDL (mg/dL)   Date Value   09/08/2017 43     BUN (mg/dL)   Date Value   09/08/2017 23     CREATININE (mg/dL)   Date Value   09/08/2017 1.05     Glucose (mg/dL)   Date Value   03/09/2017 96            Have you changed or started any medications since your last visit including any over-the-counter medicines, vitamins, or herbal medicines? no   Are you having any side effects from any of your medications? -  no  Have you stopped taking any of your medications? Is so, why? -  no    Have you seen any other physician or provider since your last visit? Yes - Records Obtained  Have you had any other diagnostic tests since your last visit? Yes - Records Obtained  Have you been seen in the emergency room and/or had an admission to a hospital since we last saw you? Yes - Records Obtained  Have you had your annual diabetic retinal (eye) exam? Yes - Records Requested  Have you had your routine dental cleaning in the past 6 months? yes -     Have you activated your [employer] account? If not, what are your barriers?  No:      Patient Care Team:  Prtety Molina MD as PCP - General (Family Medicine)  Pretty Molina MD as PCP - S Attributed Provider  Willow Jeffery MD as Consulting Physician (Cardiology)  Kevon Florian MD as Consulting Physician (Gastroenterology)         Medical History Review  Past Medical, Family, and Social History reviewed and does contribute to the patient presenting condition    Health Maintenance   Topic Date Due    Flu vaccine (1) 09/01/2017    DTaP/Tdap/Td vaccine (2 - Td) 12/06/2022    Zostavax vaccine  Addressed    Pneumococcal low/med risk  Completed
spondylosis/spinal stenosis with left lower extremity radiculopathy. No apparent sign of myelopathy   Neurological: He is alert and oriented to person, place, and time. He has normal reflexes. Skin: Skin is warm and dry. Psychiatric:   Anxiety with underlying back pain       Assessment:      1. Essential hypertension     2. Other hyperlipidemia     3. Spinal stenosis, multiple sites in spine  [facility] [place] [address] Physical [facility]   4. 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 Coronary artery disease involving native coronary artery of native heart without angina pectoris     6. Gastroesophageal reflux disease without esophagitis             Plan:      45-year-old overweight  male with underlying history significant for degenerative spondylosis was recently seen in the emergency room for increasing back pain with left lower extremity radiculopathy. Clinically there is no apparent sign of myelopathy. No foot drop normal bowel or bladder dysfunction. Review of the x-ray shows significant degenerative spondylosis with spinal stenosis. He is on Neurontin, muscle relaxants, Tylenol and Percocet as provided by emergency room. I spent a great deal of clean explaining underlying etiology to this patient. Reassurance provided. He agrees to proceed with physical therapy evaluation and [facility] pain management services. In the meantime maintain optimal posturing, alternating heat and ice pack, partial flexion of hip or sleeping position, continue activity as tolerated with fall precaution. Underlying history of coronary artery disease. Regular follow-up with cardiology. Risk factor stratification is advised. Hemodynamically stable  GERD stable on proton pump inhibitor  Chronic kidney disease stage III.   Patient is advised to refrain from judicious use of

## 2017-11-08 ENCOUNTER — HOSPITAL ENCOUNTER (OUTPATIENT)
Dept: PAIN MANAGEMENT | Age: 82
Discharge: HOME | End: 2017-11-08
Payer: MEDICARE

## 2017-11-08 VITALS
DIASTOLIC BLOOD PRESSURE: 68 MMHG | HEIGHT: 70 IN | BODY MASS INDEX: 32.5 KG/M2 | RESPIRATION RATE: 18 BRPM | TEMPERATURE: 98 F | SYSTOLIC BLOOD PRESSURE: 134 MMHG | HEART RATE: 66 BPM | WEIGHT: 227 LBS

## 2017-11-08 DIAGNOSIS — M48.062 SPINAL STENOSIS OF LUMBAR REGION WITH NEUROGENIC CLAUDICATION: Chronic | ICD-10-CM

## 2017-11-08 PROBLEM — M48.061 LUMBAR SPINAL STENOSIS: Chronic | Status: ACTIVE | Noted: 2017-11-08

## 2017-11-08 PROCEDURE — 80307 DRUG TEST PRSMV CHEM ANLYZR: CPT

## 2017-11-08 PROCEDURE — G0480 DRUG TEST DEF 1-7 CLASSES: HCPCS

## 2017-11-08 PROCEDURE — 99203 OFFICE O/P NEW LOW 30 MIN: CPT | Performed by: ANESTHESIOLOGY

## 2017-11-08 PROCEDURE — 99204 OFFICE O/P NEW MOD 45 MIN: CPT

## 2017-11-08 RX ORDER — OXYCODONE HYDROCHLORIDE 5 MG/1
5 TABLET ORAL EVERY 8 HOURS PRN
Qty: 90 TABLET | Refills: 0 | Status: SHIPPED | OUTPATIENT
Start: 2017-11-08 | End: 2017-12-08

## 2017-11-08 ASSESSMENT — PAIN DESCRIPTION - LOCATION: LOCATION: BACK;KNEE;NECK

## 2017-11-08 ASSESSMENT — PAIN SCALES - GENERAL: PAINLEVEL_OUTOF10: 10

## 2017-11-08 ASSESSMENT — PAIN DESCRIPTION - ORIENTATION: ORIENTATION: LOWER;RIGHT;LEFT;POSTERIOR

## 2017-11-08 ASSESSMENT — PAIN DESCRIPTION - PAIN TYPE: TYPE: CHRONIC PAIN

## 2017-11-08 NOTE — PROGRESS NOTES
daily 90 capsule 0    gabapentin (NEURONTIN) 300 MG capsule Take 1 capsule by mouth 2 times daily 180 capsule 0    aspirin 81 MG tablet Take 81 mg by mouth daily      clopidogrel (PLAVIX) 75 MG tablet Take 75 mg by mouth daily      atorvastatin (LIPITOR) 20 MG tablet       spironolactone (ALDACTONE) 50 MG tablet 25 mg   0    metoprolol (TOPROL-XL) 50 MG XL tablet Take 50 mg by mouth daily       isosorbide mononitrate (IMDUR) 30 MG CR tablet Take 1 tablet by mouth daily.   0       Are your Current Pain medication (s) managing the pain  No    Have been on any anti-inflammatory medications  Yes ibuprofen (Motrin)    Other Issues NONE    Electronically signed by Flaquito Aleman RN on 11/8/2017 at 2:29 PM

## 2017-11-09 NOTE — CONSULTS
[address] [facility]                                   CONSULTATION    PATIENT NAME: Raúl Kwong                      :        1935  MED REC NO:   [id number]                             ROOM:  ACCOUNT NO:   [de-identified]                           ADMIT DATE: 2017  PROVIDER:     Felipe Arias    CONSULT DATE:  2017    HISTORY OF PRESENT ILLNESS:  The patient was sent to our pain clinic by Dr. Solomon Worthington for evaluation and treatment of his chronic back pain. He is  an 68-year-old male, who comes to our pain clinic with chief complaint of  low back pain, mostly on the left side with sharp shooting band down to the  left leg, associated with burning sensation and numbness and tingling  sensation. The patient stated that he has had back pain for a long time. It got worse over 2 months ago, was given prescription for physical therapy  by Dr. Paola Beach, which he is in the process of entertaining that, but the  problem as his wife mentioned that he is in so much pain that she doubts  very much that he would be able to do the physical therapy. The patient  came to our pain clinic with no MRI on record, just an x-ray, which  revealed severe degenerative disk disease at L4-L5, L5-S1. The patient  also using a walker, and he stated that his legs buckle, and he falls a  lot. No sphincter disorder. PAST MEDICAL HISTORY:  Rectal bleeding, osteoarthritis, lumbar spinal  stenosis, hypertension, hyperlipidemia, history of blood transfusion, heart  attack, gastritis, coronary artery disease, bleeding in the brain, and  acute renal failure. PAST SURGICAL HISTORY:  Upper GI EGD, hernia repair, hand surgery, coronary  angioplasty with stent, back surgery, appendectomy. Back surgery almost  _____ years ago. FAMILY HISTORY:  Unremarkable. SUBSTANCE HISTORY:  He never smoked. He does not drink.   No history

## 2017-11-11 LAB
6-ACETYLMORPHINE, UR: NOT DETECTED
7-AMINOCLONAZEPAM, URINE: NOT DETECTED
ALPHA-OH-ALPRAZ, URINE: NOT DETECTED
ALPRAZOLAM, URINE: NOT DETECTED
AMPHETAMINES, URINE: NOT DETECTED
BARBITURATES, URINE: NOT DETECTED
BENZOYLECGONINE, UR: NOT DETECTED
BUPRENORPHINE URINE: NOT DETECTED
CARISOPRODOL, UR: NOT DETECTED
CLONAZEPAM, URINE: NOT DETECTED
CODEINE, URINE: NOT DETECTED
CREATININE URINE: 209.3 MG/DL (ref 20–400)
DIAZEPAM, URINE: NOT DETECTED
EER PAIN MGT DRUG PANEL, HIGH RES/EMIT U: NORMAL
ETHYL GLUCURONIDE UR: NOT DETECTED
FENTANYL URINE: NOT DETECTED
GABAPENTIN QNT URINE: >200 UG/ML
HYDROCODONE, URINE: PRESENT
HYDROMORPHONE, URINE: NOT DETECTED
LORAZEPAM, URINE: NOT DETECTED
MARIJUANA METAB, UR: NOT DETECTED
MDA, UR: NOT DETECTED
MDEA, EVE, UR: NOT DETECTED
MDMA URINE: NOT DETECTED
MEPERIDINE METAB, UR: NOT DETECTED
METHADONE, URINE: NOT DETECTED
METHAMPHETAMINE, URINE: NOT DETECTED
METHYLPHENIDATE: NOT DETECTED
MIDAZOLAM, URINE: NOT DETECTED
MORPHINE URINE: NOT DETECTED
NORBUPRENORPHINE, URINE: NOT DETECTED
NORDIAZEPAM, URINE: NOT DETECTED
NORFENTANYL, URINE: NOT DETECTED
NORHYDROCODONE, URINE: PRESENT
NOROXYCODONE, URINE: NOT DETECTED
NOROXYMORPHONE, URINE: NOT DETECTED
OXAZEPAM, URINE: NOT DETECTED
OXYCODONE URINE: NOT DETECTED
OXYMORPHONE, URINE: NOT DETECTED
PAIN MGT DRUG PANEL, HI RES, UR: NORMAL
PCP,URINE: NOT DETECTED
PHENTERMINE, UR: NOT DETECTED
PROPOXYPHENE, URINE: NOT DETECTED
TAPENTADOL, URINE: NOT DETECTED
TAPENTADOL-O-SULFATE, URINE: NOT DETECTED
TEMAZEPAM, URINE: NOT DETECTED
TRAMADOL, URINE: NOT DETECTED
ZOLPIDEM, URINE: NOT DETECTED

## 2017-12-05 ENCOUNTER — HOSPITAL ENCOUNTER (OUTPATIENT)
Dept: PAIN MANAGEMENT | Age: 82
Discharge: HOME | End: 2017-12-05
Payer: MEDICARE

## 2017-12-15 ENCOUNTER — HOSPITAL ENCOUNTER (OUTPATIENT)
Dept: PAIN MANAGEMENT | Age: 82
Discharge: HOME | End: 2017-12-15
Payer: MEDICARE

## 2017-12-15 VITALS
HEART RATE: 63 BPM | TEMPERATURE: 98.2 F | RESPIRATION RATE: 18 BRPM | SYSTOLIC BLOOD PRESSURE: 140 MMHG | DIASTOLIC BLOOD PRESSURE: 68 MMHG

## 2017-12-15 DIAGNOSIS — M48.062 SPINAL STENOSIS OF LUMBAR REGION WITH NEUROGENIC CLAUDICATION: Primary | Chronic | ICD-10-CM

## 2017-12-15 PROCEDURE — 99213 OFFICE O/P EST LOW 20 MIN: CPT

## 2017-12-15 PROCEDURE — 99214 OFFICE O/P EST MOD 30 MIN: CPT

## 2017-12-15 PROCEDURE — 99213 OFFICE O/P EST LOW 20 MIN: CPT | Performed by: NURSE PRACTITIONER

## 2017-12-15 RX ORDER — OXYCODONE HYDROCHLORIDE 5 MG/1
5 TABLET ORAL EVERY 8 HOURS PRN
COMMUNITY
End: 2018-06-06

## 2017-12-15 ASSESSMENT — PAIN DESCRIPTION - LOCATION: LOCATION: BACK;LEG;KNEE

## 2017-12-15 ASSESSMENT — PAIN DESCRIPTION - ORIENTATION: ORIENTATION: RIGHT;LEFT;LOWER

## 2017-12-15 ASSESSMENT — ENCOUNTER SYMPTOMS
CONSTIPATION: 0
BACK PAIN: 1
SHORTNESS OF BREATH: 0
COUGH: 0

## 2017-12-15 ASSESSMENT — PAIN DESCRIPTION - PAIN TYPE: TYPE: CHRONIC PAIN

## 2017-12-15 ASSESSMENT — PAIN SCALES - GENERAL: PAINLEVEL_OUTOF10: 4

## 2017-12-15 NOTE — PROGRESS NOTES
Quintontammie Cedeño was seen today for an opioid risk assessment as part of the protocol here at the [facility] for medication contract patients. The focus of today's session initial evaluation of mood disorder, potential suicide, and/or chemical dependence/abuse.      Medical DX:M48.06  Psych DX:F41.9    Patient Active Problem List   Diagnosis    Hyperlipidemia    HTN (hypertension)    Penile implant failure - Dr. 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    PUD (peptic ulcer disease)    Coronary artery disease involving native coronary artery of native heart without angina pectoris    Diarrhea    Abdominal pain    History of craniotomy    H/O intracranial hemorrhage    Hogan's esophagus    Cervical discogenic pain syndrome    Spinal stenosis of lumbar region with neurogenic claudication         Current Outpatient Prescriptions:     ibuprofen (ADVIL;MOTRIN) 600 MG tablet, Take 1 tablet by mouth every 8 hours as needed for Pain, Disp: 15 tablet, Rfl: 0    HYDROcodone-acetaminophen (NORCO) 5-325 MG per tablet, Take 1 tablet by mouth every 6 hours as needed for Pain (WARNING:  May cause drowsiness.  May impair ability to operate vehicles or machinery.  Do not use in combination with alcohol.) ., Disp: 20 tablet, Rfl: 0    omeprazole (PRILOSEC) 20 MG delayed release capsule, take 1 capsule by mouth once daily, Disp: 90 capsule, Rfl: 0    gabapentin (NEURONTIN) 300 MG capsule, Take 1 capsule by mouth 2 times daily, Disp: 180 capsule, Rfl: 0    aspirin 81 MG tablet, Take 81 mg

## 2017-12-15 NOTE — PROGRESS NOTES
Patient is here today to review medication contract. Chief Complaint: back pain    PMH: Patient has spinal stenosis secondary to the severe DDD. Dr. Sofie Gandhi ordered MRI on consult to assess the need for surgical intervention but this was cancelled due to possibility of clips from past brain surgery. CT was then ordered - awaiting insurance approval.  He was started on oxycodone 5 mg TID. He is currently taking one pill a day most days - occasionally will need two. Back Pain   This is a chronic problem. The current episode started more than 1 year ago. The problem occurs intermittently. The problem is unchanged. The pain is present in the lumbar spine and sacro-iliac. The quality of the pain is described as aching and shooting. The pain radiates to the left knee. The pain is at a severity of 4/10. The pain is mild. The symptoms are aggravated by standing and bending. Stiffness is present in the morning. Associated symptoms include weakness. Pertinent negatives include no chest pain or fever. He has tried analgesics and home exercises for the symptoms. The treatment provided mild relief. Patient denies any new neurological symptoms. No bowel or bladder incontinence, no weakness, and no falling. Pill count: # 63 extra tabs    Morphine equivalent: 22.5    Controlled Substances Monitoring:     Attestation: The Prescription Monitoring Report for this patient was reviewed today.  (Valeria Bangura CNP)  Documentation: Possible medication side effects, risk of tolerance and/or dependence, and alternative treatments discussed., No signs of potential drug abuse or diversion identified., Existing medication contract. Valeria Bangura CNP)    Past Medical History:   Diagnosis Date    Acute renal failure (ARF) ([facility] [address] 1/16/2016    Bleeding in brain [facility])     after fall    Coronary artery disease involving native coronary artery of native heart without angina pectoris 1/28/2016    Gastritis     Diagnoses    None. Treatment Plan:  DISCUSSION: Treatment options discussed with patient and all questions answered to patient's satisfaction. TREATMENT OPTIONS:   Continue oxycodone PRN  Awaiting CT scan  Contract compliance requirements are met. Satisfactory pain management plan. Medications help with personal goals and self-care needs. Follow up appointment scheduled.

## 2017-12-20 ENCOUNTER — HOSPITAL ENCOUNTER (OUTPATIENT)
Dept: CT IMAGING | Age: 82
Discharge: HOME | End: 2017-12-20
Payer: MEDICARE

## 2017-12-20 DIAGNOSIS — M48.062 PSEUDOCLAUDICATION SYNDROME: ICD-10-CM

## 2017-12-20 PROCEDURE — 72131 CT LUMBAR SPINE W/O DYE: CPT

## 2018-01-09 RX ORDER — OMEPRAZOLE 20 MG/1
CAPSULE, DELAYED RELEASE ORAL
Qty: 90 CAPSULE | Refills: 1 | Status: SHIPPED | OUTPATIENT
Start: 2018-01-09 | End: 2018-07-06 | Stop reason: SDUPTHER

## 2018-01-23 ENCOUNTER — HOSPITAL ENCOUNTER (OUTPATIENT)
Dept: PAIN MANAGEMENT | Age: 83
Discharge: HOME | End: 2018-01-23
Payer: MEDICARE

## 2018-01-23 DIAGNOSIS — M48.062 SPINAL STENOSIS OF LUMBAR REGION WITH NEUROGENIC CLAUDICATION: Primary | Chronic | ICD-10-CM

## 2018-01-23 DIAGNOSIS — Z51.81 MEDICATION MONITORING ENCOUNTER: Chronic | ICD-10-CM

## 2018-01-23 PROCEDURE — 99214 OFFICE O/P EST MOD 30 MIN: CPT

## 2018-01-23 PROCEDURE — 99213 OFFICE O/P EST LOW 20 MIN: CPT | Performed by: NURSE PRACTITIONER

## 2018-01-23 RX ORDER — ACETAMINOPHEN 500 MG
500 TABLET ORAL EVERY 6 HOURS PRN
COMMUNITY
End: 2019-08-07

## 2018-01-23 ASSESSMENT — ENCOUNTER SYMPTOMS
COUGH: 0
CONSTIPATION: 0
BACK PAIN: 1
BOWEL INCONTINENCE: 0
SHORTNESS OF BREATH: 0

## 2018-01-23 NOTE — PROGRESS NOTES
Patient is here today to review medication contract. Chief Complaint: back pain    PMH: Patient has spinal stenosis secondary to the severe DDD. He had back surgery 40 years ago. CT was ordered and shows DEGENERATIVE CHANGES: There is moderate disc space narrowing and vacuum disc phenomenon at L3-L4, and L4-L5. There is mild to moderate marginal osteophytosis at all levels. There is a mild broad-based circumferential disc bulge causing mild-to-moderate narrowing of the inferior neural foramina at L1-L2. The central canal is patent. There is moderate central and lateral spinal stenosis at L2-L3 due to a moderate circumferential disc marginal osteophyte and trefoil type narrowing of the thecal sac which measures 7 to 8 mm in the midline. Similar more severe changes are noted at L3-L4. There is severe narrowing of the neural foramina bilaterally at L4-L5 due to hypertrophic facet disease and circumferential disc marginal osteophyte. There is moderate narrowing bilaterally at L5-S1 neural foramina due to a posterior disc marginal osteophyte.  Severe facet disease noted at this level as well. Patient reports he has been falling occasionally due to weakness in his left leg. He denies bladder and bowel incontinence. He was started on oxycodone 5 mg TID but takes only when pain is severe. He has #62 tabs from a November fill.        Back Pain   This is a chronic problem. The current episode started more than 1 year ago. The problem occurs constantly. The problem is unchanged. The pain is present in the lumbar spine. The quality of the pain is described as aching. Radiates to: down left leg to the knee. The pain is at a severity of 5/10. The pain is moderate. The symptoms are aggravated by standing and sitting. Pertinent negatives include no bladder incontinence, bowel incontinence, chest pain or fever. He has tried analgesics for the symptoms. The treatment provided mild relief.      Patient denies any new neurological 5 MG immediate release tablet, Take 5 mg by mouth every 8 hours as needed for Pain ., Disp: , Rfl:     ibuprofen (ADVIL;MOTRIN) 600 MG tablet, Take 1 tablet by mouth every 8 hours as needed for Pain, Disp: 15 tablet, Rfl: 0    gabapentin (NEURONTIN) 300 MG capsule, Take 1 capsule by mouth 2 times daily, Disp: 180 capsule, Rfl: 0    aspirin 81 MG tablet, Take 81 mg by mouth daily, Disp: , Rfl:     clopidogrel (PLAVIX) 75 MG tablet, Take 75 mg by mouth daily, Disp: , Rfl:     atorvastatin (LIPITOR) 20 MG tablet, , Disp: , Rfl:     spironolactone (ALDACTONE) 50 MG tablet, 25 mg , Disp: , Rfl: 0    metoprolol (TOPROL-XL) 50 MG XL tablet, Take 50 mg by mouth daily , Disp: , Rfl:     isosorbide mononitrate (IMDUR) 30 MG CR tablet, Take 1 tablet by mouth daily. , Disp: , Rfl: 0    History reviewed. No pertinent family history. Social History     Social History    Marital status:      Spouse name: N/A    Number of children: N/A    Years of education: N/A     Occupational History    Not on file. Social History Main Topics    Smoking status: Never Smoker    Smokeless tobacco: Never Used    Alcohol use No    Drug use: No    Sexual activity: No     Other Topics Concern    Not on file     Social History Narrative    No narrative on file       Review of Systems:  Review of Systems   Constitution: Negative for chills and fever. Cardiovascular: Negative for chest pain and irregular heartbeat. Respiratory: Negative for cough and shortness of breath. Musculoskeletal: Positive for back pain. Gastrointestinal: Negative for bowel incontinence and constipation. Genitourinary: Negative for bladder incontinence. Neurological: Negative for disturbances in coordination and loss of balance. Physical Exam:  T 97.5  /58  P 61  R 18      Physical Exam   Constitutional: He is oriented to person, place, and time and well-developed, well-nourished, and in no distress.    HENT:   Head:

## 2018-03-21 ENCOUNTER — OFFICE VISIT (OUTPATIENT)
Dept: INTERNAL MEDICINE CLINIC | Age: 83
End: 2018-03-21
Payer: MEDICARE

## 2018-03-21 VITALS
DIASTOLIC BLOOD PRESSURE: 70 MMHG | RESPIRATION RATE: 17 BRPM | HEIGHT: 70 IN | HEART RATE: 70 BPM | BODY MASS INDEX: 32.35 KG/M2 | SYSTOLIC BLOOD PRESSURE: 138 MMHG | OXYGEN SATURATION: 98 % | WEIGHT: 226 LBS

## 2018-03-21 DIAGNOSIS — M48.062 SPINAL STENOSIS OF LUMBAR REGION WITH NEUROGENIC CLAUDICATION: Chronic | ICD-10-CM

## 2018-03-21 DIAGNOSIS — Z86.79 H/O INTRACRANIAL HEMORRHAGE: ICD-10-CM

## 2018-03-21 DIAGNOSIS — K22.70 BARRETT'S ESOPHAGUS WITHOUT DYSPLASIA: ICD-10-CM

## 2018-03-21 DIAGNOSIS — I10 ESSENTIAL HYPERTENSION: Primary | ICD-10-CM

## 2018-03-21 DIAGNOSIS — Z98.890 HISTORY OF CRANIOTOMY: ICD-10-CM

## 2018-03-21 DIAGNOSIS — I25.10 CORONARY ARTERY DISEASE INVOLVING NATIVE CORONARY ARTERY OF NATIVE HEART WITHOUT ANGINA PECTORIS: ICD-10-CM

## 2018-03-21 DIAGNOSIS — Z79.891 CHRONIC USE OF OPIATE FOR THERAPEUTIC PURPOSE: ICD-10-CM

## 2018-03-21 DIAGNOSIS — E78.49 OTHER HYPERLIPIDEMIA: ICD-10-CM

## 2018-03-21 DIAGNOSIS — N18.31 CHRONIC KIDNEY DISEASE (CKD) STAGE G3A/A1, MODERATELY DECREASED GLOMERULAR FILTRATION RATE (GFR) BETWEEN 45-59 ML/MIN/1.73 SQUARE METER AND ALBUMINURIA CREATININE RATIO LESS THAN 30 MG/G (HCC): ICD-10-CM

## 2018-03-21 PROCEDURE — G8417 CALC BMI ABV UP PARAM F/U: HCPCS | Performed by: FAMILY MEDICINE

## 2018-03-21 PROCEDURE — 1123F ACP DISCUSS/DSCN MKR DOCD: CPT | Performed by: FAMILY MEDICINE

## 2018-03-21 PROCEDURE — 1036F TOBACCO NON-USER: CPT | Performed by: FAMILY MEDICINE

## 2018-03-21 PROCEDURE — G8598 ASA/ANTIPLAT THER USED: HCPCS | Performed by: FAMILY MEDICINE

## 2018-03-21 PROCEDURE — G8427 DOCREV CUR MEDS BY ELIG CLIN: HCPCS | Performed by: FAMILY MEDICINE

## 2018-03-21 PROCEDURE — 4040F PNEUMOC VAC/ADMIN/RCVD: CPT | Performed by: FAMILY MEDICINE

## 2018-03-21 PROCEDURE — G8484 FLU IMMUNIZE NO ADMIN: HCPCS | Performed by: FAMILY MEDICINE

## 2018-03-21 PROCEDURE — 99214 OFFICE O/P EST MOD 30 MIN: CPT | Performed by: FAMILY MEDICINE

## 2018-03-21 ASSESSMENT — ENCOUNTER SYMPTOMS
BACK PAIN: 1
ALLERGIC/IMMUNOLOGIC NEGATIVE: 1
GASTROINTESTINAL NEGATIVE: 1
EYES NEGATIVE: 1
RESPIRATORY NEGATIVE: 1

## 2018-03-21 NOTE — PROGRESS NOTES
alert and oriented to person, place, and time. He has normal reflexes. Skin: Skin is warm and dry. Psychiatric:   Depression without any suicidality. Underlying chronic pain syndrome       Assessment:      1. Essential hypertension     2. Other hyperlipidemia     3. Spinal stenosis of lumbar region with neurogenic claudication     4. 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 History of craniotomy     6. H/O intracranial hemorrhage     7. Chronic kidney disease (CKD) stage G3a/A1, moderately decreased glomerular filtration rate (GFR) between 45-59 mL/min/1.73 square meter and albuminuria creatinine ratio less than 30 mg/g     8. Coronary artery disease involving native coronary artery of native heart without angina pectoris     9. Chronic use of opiate for therapeutic purpose             Plan:      8year-old morbidly obese  male is brought in by his wife for routine follow-up. Known history of chronic back pain for which he is established with pain management, opiate dependent. However, his wife state that she does not want him to get addicted to opiates. Is also taking Neurontin that he is tolerating well. Fall precaution is advised. Reassurance provided. In view of chronicity of his back pain with associated mood disorder, I strongly suggest that he consider mental health consultation. He declined  History of traumatic intracranial hemorrhage status post craniotomy. Neurologically intact next on chronic kidney disease with significant improvement. Creatinine is 1.01. Avoid nephrotoxic drugs including but not limited to anti-inflammatory to contrast  Chronic kidney disease. Risk factor stratification is advised. Advised regular follow-up with pain management  GERD patient is asymptomatic continue proton pump inhibitor as per gastroneurology   hemodynamically stable. Continue current regimen of antihypertensive that he's been tolerating well.   He would benefit from weight loss,

## 2018-06-06 ENCOUNTER — OFFICE VISIT (OUTPATIENT)
Dept: GASTROENTEROLOGY | Age: 83
End: 2018-06-06
Payer: MEDICARE

## 2018-06-06 VITALS
DIASTOLIC BLOOD PRESSURE: 62 MMHG | HEART RATE: 69 BPM | SYSTOLIC BLOOD PRESSURE: 135 MMHG | BODY MASS INDEX: 33.03 KG/M2 | WEIGHT: 230.2 LBS

## 2018-06-06 DIAGNOSIS — K22.70 BARRETT'S ESOPHAGUS WITHOUT DYSPLASIA: Primary | ICD-10-CM

## 2018-06-06 DIAGNOSIS — K58.9 IRRITABLE BOWEL SYNDROME, UNSPECIFIED TYPE: ICD-10-CM

## 2018-06-06 DIAGNOSIS — K27.9 PUD (PEPTIC ULCER DISEASE): ICD-10-CM

## 2018-06-06 PROCEDURE — 4040F PNEUMOC VAC/ADMIN/RCVD: CPT | Performed by: INTERNAL MEDICINE

## 2018-06-06 PROCEDURE — G8598 ASA/ANTIPLAT THER USED: HCPCS | Performed by: INTERNAL MEDICINE

## 2018-06-06 PROCEDURE — G8427 DOCREV CUR MEDS BY ELIG CLIN: HCPCS | Performed by: INTERNAL MEDICINE

## 2018-06-06 PROCEDURE — 1123F ACP DISCUSS/DSCN MKR DOCD: CPT | Performed by: INTERNAL MEDICINE

## 2018-06-06 PROCEDURE — 1036F TOBACCO NON-USER: CPT | Performed by: INTERNAL MEDICINE

## 2018-06-06 PROCEDURE — 99214 OFFICE O/P EST MOD 30 MIN: CPT | Performed by: INTERNAL MEDICINE

## 2018-06-06 PROCEDURE — G8417 CALC BMI ABV UP PARAM F/U: HCPCS | Performed by: INTERNAL MEDICINE

## 2018-06-06 ASSESSMENT — ENCOUNTER SYMPTOMS
ANAL BLEEDING: 0
GASTROINTESTINAL NEGATIVE: 1
VOMITING: 0
ABDOMINAL PAIN: 0
DIARRHEA: 0
BACK PAIN: 1
RECTAL PAIN: 0
SHORTNESS OF BREATH: 1
CONSTIPATION: 0
ABDOMINAL DISTENTION: 0
BLOOD IN STOOL: 0
NAUSEA: 0

## 2018-07-09 RX ORDER — OMEPRAZOLE 20 MG/1
CAPSULE, DELAYED RELEASE ORAL
Qty: 90 CAPSULE | Refills: 3 | Status: SHIPPED | OUTPATIENT
Start: 2018-07-09 | End: 2019-07-05 | Stop reason: SDUPTHER

## 2018-09-19 ENCOUNTER — HOSPITAL ENCOUNTER (OUTPATIENT)
Age: 83
Discharge: HOME | End: 2018-09-19
Payer: MEDICARE

## 2018-09-19 LAB
ABSOLUTE EOS #: 0.2 K/UL (ref 0–0.4)
ABSOLUTE IMMATURE GRANULOCYTE: ABNORMAL K/UL (ref 0–0.3)
ABSOLUTE LYMPH #: 2 K/UL (ref 1–4.8)
ABSOLUTE MONO #: 0.8 K/UL (ref 0.2–0.8)
ALBUMIN SERPL-MCNC: 4 G/DL (ref 3.5–5.2)
ALBUMIN/GLOBULIN RATIO: ABNORMAL (ref 1–2.5)
ALP BLD-CCNC: 62 U/L (ref 40–129)
ALT SERPL-CCNC: 12 U/L (ref 5–41)
ANION GAP SERPL CALCULATED.3IONS-SCNC: 12 MMOL/L (ref 9–17)
AST SERPL-CCNC: 18 U/L
BASOPHILS # BLD: 0 % (ref 0–2)
BASOPHILS ABSOLUTE: 0 K/UL (ref 0–0.2)
BILIRUB SERPL-MCNC: 0.35 MG/DL (ref 0.3–1.2)
BUN BLDV-MCNC: 24 MG/DL (ref 8–23)
BUN/CREAT BLD: 21 (ref 9–20)
CALCIUM SERPL-MCNC: 9 MG/DL (ref 8.6–10.4)
CHLORIDE BLD-SCNC: 105 MMOL/L (ref 98–107)
CHOLESTEROL, FASTING: 135 MG/DL
CHOLESTEROL/HDL RATIO: 3.6
CO2: 26 MMOL/L (ref 20–31)
CREAT SERPL-MCNC: 1.16 MG/DL (ref 0.7–1.2)
DIFFERENTIAL TYPE: ABNORMAL
EOSINOPHILS RELATIVE PERCENT: 2 % (ref 1–4)
GFR AFRICAN AMERICAN: >60 ML/MIN
GFR NON-AFRICAN AMERICAN: >60 ML/MIN
GFR SERPL CREATININE-BSD FRML MDRD: ABNORMAL ML/MIN/{1.73_M2}
GFR SERPL CREATININE-BSD FRML MDRD: ABNORMAL ML/MIN/{1.73_M2}
GLUCOSE FASTING: 95 MG/DL (ref 70–99)
HCT VFR BLD CALC: 42 % (ref 41–53)
HDLC SERPL-MCNC: 38 MG/DL
HEMOGLOBIN: 13.6 G/DL (ref 13.5–17.5)
IMMATURE GRANULOCYTES: ABNORMAL %
LDL CHOLESTEROL: 65 MG/DL (ref 0–130)
LYMPHOCYTES # BLD: 26 % (ref 24–44)
MCH RBC QN AUTO: 29.9 PG (ref 26–34)
MCHC RBC AUTO-ENTMCNC: 32.2 G/DL (ref 31–37)
MCV RBC AUTO: 92.7 FL (ref 80–100)
MONOCYTES # BLD: 10 % (ref 1–7)
NRBC AUTOMATED: ABNORMAL PER 100 WBC
PDW BLD-RTO: 14.2 % (ref 11.5–14.5)
PLATELET # BLD: 236 K/UL (ref 130–400)
PLATELET ESTIMATE: ABNORMAL
PMV BLD AUTO: 7.2 FL (ref 6–12)
POTASSIUM SERPL-SCNC: 4.7 MMOL/L (ref 3.7–5.3)
RBC # BLD: 4.54 M/UL (ref 4.5–5.9)
RBC # BLD: ABNORMAL 10*6/UL
SEG NEUTROPHILS: 62 % (ref 36–66)
SEGMENTED NEUTROPHILS ABSOLUTE COUNT: 4.6 K/UL (ref 1.8–7.7)
SODIUM BLD-SCNC: 143 MMOL/L (ref 135–144)
TOTAL CK: 86 U/L (ref 39–308)
TOTAL PROTEIN: 6.8 G/DL (ref 6.4–8.3)
TRIGLYCERIDE, FASTING: 159 MG/DL
VLDLC SERPL CALC-MCNC: ABNORMAL MG/DL (ref 1–30)
WBC # BLD: 7.5 K/UL (ref 3.5–11)
WBC # BLD: ABNORMAL 10*3/UL

## 2018-09-19 PROCEDURE — 80053 COMPREHEN METABOLIC PANEL: CPT

## 2018-09-19 PROCEDURE — 85025 COMPLETE CBC W/AUTO DIFF WBC: CPT

## 2018-09-19 PROCEDURE — 80061 LIPID PANEL: CPT

## 2018-09-19 PROCEDURE — 82550 ASSAY OF CK (CPK): CPT

## 2018-09-19 PROCEDURE — 36415 COLL VENOUS BLD VENIPUNCTURE: CPT

## 2018-09-21 ENCOUNTER — OFFICE VISIT (OUTPATIENT)
Dept: INTERNAL MEDICINE CLINIC | Age: 83
End: 2018-09-21
Payer: MEDICARE

## 2018-09-21 VITALS
HEIGHT: 70 IN | DIASTOLIC BLOOD PRESSURE: 70 MMHG | WEIGHT: 224.6 LBS | OXYGEN SATURATION: 98 % | BODY MASS INDEX: 32.16 KG/M2 | HEART RATE: 58 BPM | SYSTOLIC BLOOD PRESSURE: 130 MMHG

## 2018-09-21 DIAGNOSIS — F39 MOOD DISORDER (HCC): ICD-10-CM

## 2018-09-21 DIAGNOSIS — N18.31 CHRONIC KIDNEY DISEASE (CKD) STAGE G3A/A1, MODERATELY DECREASED GLOMERULAR FILTRATION RATE (GFR) BETWEEN 45-59 ML/MIN/1.73 SQUARE METER AND ALBUMINURIA CREATININE RATIO LESS THAN 30 MG/G (HCC): ICD-10-CM

## 2018-09-21 DIAGNOSIS — M50.20 CERVICAL DISCOGENIC PAIN SYNDROME: ICD-10-CM

## 2018-09-21 DIAGNOSIS — K22.70 BARRETT'S ESOPHAGUS WITHOUT DYSPLASIA: ICD-10-CM

## 2018-09-21 DIAGNOSIS — M48.062 SPINAL STENOSIS OF LUMBAR REGION WITH NEUROGENIC CLAUDICATION: Chronic | ICD-10-CM

## 2018-09-21 DIAGNOSIS — T83.410S FAILURE OF PENILE IMPLANT, SEQUELA: ICD-10-CM

## 2018-09-21 DIAGNOSIS — E78.49 OTHER HYPERLIPIDEMIA: ICD-10-CM

## 2018-09-21 DIAGNOSIS — I10 ESSENTIAL HYPERTENSION: Primary | ICD-10-CM

## 2018-09-21 DIAGNOSIS — I25.10 CORONARY ARTERY DISEASE INVOLVING NATIVE CORONARY ARTERY OF NATIVE HEART WITHOUT ANGINA PECTORIS: ICD-10-CM

## 2018-09-21 DIAGNOSIS — M25.50 ARTHRALGIA, UNSPECIFIED JOINT: ICD-10-CM

## 2018-09-21 PROCEDURE — 1036F TOBACCO NON-USER: CPT | Performed by: FAMILY MEDICINE

## 2018-09-21 PROCEDURE — 99214 OFFICE O/P EST MOD 30 MIN: CPT | Performed by: FAMILY MEDICINE

## 2018-09-21 PROCEDURE — G8598 ASA/ANTIPLAT THER USED: HCPCS | Performed by: FAMILY MEDICINE

## 2018-09-21 PROCEDURE — 4040F PNEUMOC VAC/ADMIN/RCVD: CPT | Performed by: FAMILY MEDICINE

## 2018-09-21 PROCEDURE — 1123F ACP DISCUSS/DSCN MKR DOCD: CPT | Performed by: FAMILY MEDICINE

## 2018-09-21 PROCEDURE — G8417 CALC BMI ABV UP PARAM F/U: HCPCS | Performed by: FAMILY MEDICINE

## 2018-09-21 PROCEDURE — G8427 DOCREV CUR MEDS BY ELIG CLIN: HCPCS | Performed by: FAMILY MEDICINE

## 2018-09-21 PROCEDURE — 1101F PT FALLS ASSESS-DOCD LE1/YR: CPT | Performed by: FAMILY MEDICINE

## 2018-09-21 ASSESSMENT — ENCOUNTER SYMPTOMS
GASTROINTESTINAL NEGATIVE: 1
ALLERGIC/IMMUNOLOGIC NEGATIVE: 1
RESPIRATORY NEGATIVE: 1
BACK PAIN: 1
EYES NEGATIVE: 1

## 2018-09-21 NOTE — PROGRESS NOTES
on.  It is my impression that patient refer  all inquiries to his wife but at the same time express helplessness. .  I advised him to have an open ended communication with his pain management. Reassurance provided. Is complaining of chronic stiffness of both hands, knee pain, hip pains. He agrees to be seen by rheumatologist  Mood disorder with chronic pain syndrome. He is upset for chronic pain syndrome. Patient not taking medications or his wife not giving him medications as suggested by pain management. Also suggest mental health consultation. He denies suicidality. Is also consider massage therapy, physical medicine and rehab  GERD stable on proton pump inhibitor. Hyperlipidemia on statin that he is tolerating well  Hypertension well controlled to beta blocker and diuretic that he is tolerating well. History of coronary artery disease. Risk factor stratification is advised. Advised to follow closely with his cardiologist.  Continue aspirin, Plavix, beta blocker and statin  He denies tobacco, excessive alcohol or illicit drug use  Obesity. Benefit from modest weight loss, continue activity as tolerated with fall precaution  Chronic kidney disease with creatinine of 1.16. Maintain oral hydration. Avoid nephrotoxic drugs, anti-inflammatory and radiocontrast  History of traumatic intracranial hemorrhage status post craniotomy. Recent labs reviewed, discussed with patient, question answered. Safety counseling including but not limited to carbon monoxide/fire alarm, ample lighting, voiding loose clutter and staying in familiar environment. He is encouraged to call me for any concern  This note is created with a voice recognition program and while intend to generate a document that accurately reflects the content of the visit, no guarantee can be provided that every mistake has been identified and corrected by editing.                   Jayro Chen MD

## 2019-03-08 ENCOUNTER — OFFICE VISIT (OUTPATIENT)
Dept: INTERNAL MEDICINE CLINIC | Age: 84
End: 2019-03-08
Payer: MEDICARE

## 2019-03-08 VITALS
OXYGEN SATURATION: 95 % | WEIGHT: 227.6 LBS | BODY MASS INDEX: 32.58 KG/M2 | HEIGHT: 70 IN | RESPIRATION RATE: 17 BRPM | SYSTOLIC BLOOD PRESSURE: 130 MMHG | DIASTOLIC BLOOD PRESSURE: 60 MMHG | HEART RATE: 65 BPM

## 2019-03-08 DIAGNOSIS — I25.10 CORONARY ARTERY DISEASE INVOLVING NATIVE CORONARY ARTERY OF NATIVE HEART WITHOUT ANGINA PECTORIS: ICD-10-CM

## 2019-03-08 DIAGNOSIS — N18.31 CHRONIC KIDNEY DISEASE (CKD) STAGE G3A/A1, MODERATELY DECREASED GLOMERULAR FILTRATION RATE (GFR) BETWEEN 45-59 ML/MIN/1.73 SQUARE METER AND ALBUMINURIA CREATININE RATIO LESS THAN 30 MG/G (HCC): ICD-10-CM

## 2019-03-08 DIAGNOSIS — M51.26 LUMBAR DISCOGENIC PAIN SYNDROME: Primary | ICD-10-CM

## 2019-03-08 DIAGNOSIS — F39 MOOD DISORDER (HCC): ICD-10-CM

## 2019-03-08 DIAGNOSIS — K22.70 BARRETT'S ESOPHAGUS WITHOUT DYSPLASIA: ICD-10-CM

## 2019-03-08 DIAGNOSIS — I10 ESSENTIAL HYPERTENSION: ICD-10-CM

## 2019-03-08 DIAGNOSIS — M50.20 CERVICAL DISCOGENIC PAIN SYNDROME: ICD-10-CM

## 2019-03-08 DIAGNOSIS — Z98.890 HISTORY OF CRANIOTOMY: ICD-10-CM

## 2019-03-08 DIAGNOSIS — E78.2 MIXED HYPERLIPIDEMIA: ICD-10-CM

## 2019-03-08 DIAGNOSIS — Z86.79 H/O INTRACRANIAL HEMORRHAGE: ICD-10-CM

## 2019-03-08 PROBLEM — M51.360 LUMBAR DISCOGENIC PAIN SYNDROME: Status: ACTIVE | Noted: 2019-03-08

## 2019-03-08 PROCEDURE — 1101F PT FALLS ASSESS-DOCD LE1/YR: CPT | Performed by: FAMILY MEDICINE

## 2019-03-08 PROCEDURE — G8427 DOCREV CUR MEDS BY ELIG CLIN: HCPCS | Performed by: FAMILY MEDICINE

## 2019-03-08 PROCEDURE — 99214 OFFICE O/P EST MOD 30 MIN: CPT | Performed by: FAMILY MEDICINE

## 2019-03-08 PROCEDURE — G8484 FLU IMMUNIZE NO ADMIN: HCPCS | Performed by: FAMILY MEDICINE

## 2019-03-08 PROCEDURE — 1036F TOBACCO NON-USER: CPT | Performed by: FAMILY MEDICINE

## 2019-03-08 PROCEDURE — G8598 ASA/ANTIPLAT THER USED: HCPCS | Performed by: FAMILY MEDICINE

## 2019-03-08 PROCEDURE — 1123F ACP DISCUSS/DSCN MKR DOCD: CPT | Performed by: FAMILY MEDICINE

## 2019-03-08 PROCEDURE — 4040F PNEUMOC VAC/ADMIN/RCVD: CPT | Performed by: FAMILY MEDICINE

## 2019-03-08 PROCEDURE — G8417 CALC BMI ABV UP PARAM F/U: HCPCS | Performed by: FAMILY MEDICINE

## 2019-03-08 RX ORDER — DIVALPROEX SODIUM 125 MG/1
125 TABLET, DELAYED RELEASE ORAL 2 TIMES DAILY
Qty: 90 TABLET | Refills: 3 | Status: SHIPPED | OUTPATIENT
Start: 2019-03-08 | End: 2019-08-07

## 2019-03-08 ASSESSMENT — PATIENT HEALTH QUESTIONNAIRE - PHQ9
SUM OF ALL RESPONSES TO PHQ QUESTIONS 1-9: 0
2. FEELING DOWN, DEPRESSED OR HOPELESS: 0
SUM OF ALL RESPONSES TO PHQ QUESTIONS 1-9: 0
1. LITTLE INTEREST OR PLEASURE IN DOING THINGS: 0
SUM OF ALL RESPONSES TO PHQ QUESTIONS 1-9: 0
SUM OF ALL RESPONSES TO PHQ QUESTIONS 1-9: 0

## 2019-03-08 ASSESSMENT — ENCOUNTER SYMPTOMS
EYES NEGATIVE: 1
BACK PAIN: 1
GASTROINTESTINAL NEGATIVE: 1
ALLERGIC/IMMUNOLOGIC NEGATIVE: 1
RESPIRATORY NEGATIVE: 1

## 2019-04-04 ENCOUNTER — HOSPITAL ENCOUNTER (OUTPATIENT)
Age: 84
Discharge: HOME | End: 2019-04-04
Payer: MEDICARE

## 2019-04-04 LAB
ALBUMIN SERPL-MCNC: 4.1 G/DL (ref 3.5–5.2)
ALBUMIN/GLOBULIN RATIO: 1.3 (ref 1–2.5)
ALP BLD-CCNC: 66 U/L (ref 40–129)
ALT SERPL-CCNC: 14 U/L (ref 5–41)
ANION GAP SERPL CALCULATED.3IONS-SCNC: 16 MMOL/L (ref 9–17)
AST SERPL-CCNC: 22 U/L
BILIRUB SERPL-MCNC: 0.53 MG/DL (ref 0.3–1.2)
BUN BLDV-MCNC: 25 MG/DL (ref 8–23)
BUN/CREAT BLD: ABNORMAL (ref 9–20)
CALCIUM SERPL-MCNC: 9 MG/DL (ref 8.6–10.4)
CHLORIDE BLD-SCNC: 103 MMOL/L (ref 98–107)
CHOLESTEROL, FASTING: 127 MG/DL
CHOLESTEROL/HDL RATIO: 3.3
CO2: 23 MMOL/L (ref 20–31)
CREAT SERPL-MCNC: 1.21 MG/DL (ref 0.7–1.2)
GFR AFRICAN AMERICAN: >60 ML/MIN
GFR NON-AFRICAN AMERICAN: 58 ML/MIN
GFR SERPL CREATININE-BSD FRML MDRD: ABNORMAL ML/MIN/{1.73_M2}
GFR SERPL CREATININE-BSD FRML MDRD: ABNORMAL ML/MIN/{1.73_M2}
GLUCOSE FASTING: 91 MG/DL (ref 70–99)
HDLC SERPL-MCNC: 39 MG/DL
LDL CHOLESTEROL: 60 MG/DL (ref 0–130)
POTASSIUM SERPL-SCNC: 4.7 MMOL/L (ref 3.7–5.3)
SODIUM BLD-SCNC: 142 MMOL/L (ref 135–144)
TOTAL CK: 100 U/L (ref 39–308)
TOTAL PROTEIN: 7.3 G/DL (ref 6.4–8.3)
TRIGLYCERIDE, FASTING: 139 MG/DL
VLDLC SERPL CALC-MCNC: ABNORMAL MG/DL (ref 1–30)

## 2019-04-04 PROCEDURE — 82550 ASSAY OF CK (CPK): CPT

## 2019-04-04 PROCEDURE — 80061 LIPID PANEL: CPT

## 2019-04-04 PROCEDURE — 36415 COLL VENOUS BLD VENIPUNCTURE: CPT

## 2019-04-04 PROCEDURE — 80053 COMPREHEN METABOLIC PANEL: CPT

## 2019-07-08 RX ORDER — OMEPRAZOLE 20 MG/1
CAPSULE, DELAYED RELEASE ORAL
Qty: 90 CAPSULE | Refills: 0 | Status: SHIPPED | OUTPATIENT
Start: 2019-07-08 | End: 2019-10-09 | Stop reason: SDUPTHER

## 2019-08-07 ENCOUNTER — OFFICE VISIT (OUTPATIENT)
Dept: GASTROENTEROLOGY | Age: 84
End: 2019-08-07
Payer: MEDICARE

## 2019-08-07 VITALS
DIASTOLIC BLOOD PRESSURE: 64 MMHG | WEIGHT: 229.8 LBS | HEART RATE: 69 BPM | BODY MASS INDEX: 32.97 KG/M2 | SYSTOLIC BLOOD PRESSURE: 139 MMHG

## 2019-08-07 DIAGNOSIS — K22.70 BARRETT'S ESOPHAGUS WITHOUT DYSPLASIA: Primary | ICD-10-CM

## 2019-08-07 DIAGNOSIS — K58.8 OTHER IRRITABLE BOWEL SYNDROME: ICD-10-CM

## 2019-08-07 DIAGNOSIS — K27.9 PUD (PEPTIC ULCER DISEASE): ICD-10-CM

## 2019-08-07 PROCEDURE — 1123F ACP DISCUSS/DSCN MKR DOCD: CPT | Performed by: INTERNAL MEDICINE

## 2019-08-07 PROCEDURE — G8427 DOCREV CUR MEDS BY ELIG CLIN: HCPCS | Performed by: INTERNAL MEDICINE

## 2019-08-07 PROCEDURE — 1036F TOBACCO NON-USER: CPT | Performed by: INTERNAL MEDICINE

## 2019-08-07 PROCEDURE — 99214 OFFICE O/P EST MOD 30 MIN: CPT | Performed by: INTERNAL MEDICINE

## 2019-08-07 PROCEDURE — G8598 ASA/ANTIPLAT THER USED: HCPCS | Performed by: INTERNAL MEDICINE

## 2019-08-07 PROCEDURE — G8417 CALC BMI ABV UP PARAM F/U: HCPCS | Performed by: INTERNAL MEDICINE

## 2019-08-07 PROCEDURE — 4040F PNEUMOC VAC/ADMIN/RCVD: CPT | Performed by: INTERNAL MEDICINE

## 2019-08-07 ASSESSMENT — ENCOUNTER SYMPTOMS
ABDOMINAL PAIN: 0
NAUSEA: 0
TROUBLE SWALLOWING: 0
SHORTNESS OF BREATH: 1
ABDOMINAL DISTENTION: 0
VOMITING: 0
COUGH: 1
BACK PAIN: 1
RECTAL PAIN: 0
BLOOD IN STOOL: 0
DIARRHEA: 1
ANAL BLEEDING: 0
CONSTIPATION: 1

## 2019-08-07 NOTE — PROGRESS NOTES
Subjective:      Patient ID: Del Cornelius is a 82 y.o. male. HPI    Dr. Lauri Mitchell MD our mutual patient Del Cornelius was seen  for   1. 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 PUD (peptic ulcer disease)    3. Other irritable bowel syndrome     . Here for f/u after more than a year  Has hx of above issues  Has been on PPI   Has some abd pains  No bleeding  No melena  Mild constipation   Has breathing issues  Has abd bloating and gas    Past Medical, Family, and Social History reviewed and does contribute to the patient presenting condition. patient\"s PMH/PSH,SH,PSYCH hx, MEDs, ALLERGIES, and ROS was all reviewed and updated ion the appropriate sections    Review of Systems   Constitutional: Positive for fatigue. HENT: Negative. Negative for trouble swallowing. Eyes: Positive for visual disturbance (glasses). Respiratory: Positive for cough and shortness of breath. Cardiovascular: Positive for leg swelling. Gastrointestinal: Positive for constipation and diarrhea (off and on). Negative for abdominal distention, abdominal pain, anal bleeding, blood in stool, nausea, rectal pain and vomiting. Denies   Endocrine: Positive for cold intolerance. Genitourinary: Negative. Musculoskeletal: Positive for arthralgias, back pain and gait problem. Skin: Negative. Allergic/Immunologic: Positive for food allergies. Neurological: Negative for dizziness, weakness, light-headedness, numbness and headaches. Hematological: Bruises/bleeds easily. Psychiatric/Behavioral: Positive for sleep disturbance. The patient is nervous/anxious. Reviewed and agree  Objective:   Physical Exam   Constitutional: He is oriented to person, place, and time. He appears well-developed and well-nourished. Anxious    HENT:   Head: Normocephalic and atraumatic. Eyes: Pupils are equal, round, and reactive to light. Conjunctivae and EOM are normal.   Neck: Normal range of motion. Neck supple. Cardiovascular: Normal rate and regular rhythm. Pulmonary/Chest: Effort normal. He has rales. Abdominal: Soft. Bowel sounds are normal.   NON TENDER, NON DISTENTED  LIVER SPLEEN AND HERNIAS ARE NOT  PALPABLE  BOWEL SOUNDS ARE POSITIVE      Genitourinary: Rectum normal.   Musculoskeletal: Normal range of motion. Neurological: He is alert and oriented to person, place, and time. He has normal reflexes. Skin: Skin is warm. Vitals reviewed. 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 He was explained about the possibility of calcium and magnesium malabsorption and was advised to start taking calcium supplements with Vit D. Some over the counter regimens were explained to patient. Some dietary advices were also given. He has verbalized understanding and agreement to this. Pt was advised in detail about some life style and dietary modifications. He was advised about avoidance of caffeine, nicotine and chocolate. Pt was also told to stay away from any kind of fast foods, soda pops.  He was also advised to avoid lots of spices, grease and fried food etc.     Instructions were also given about trying to arrange the timing, quality and quantity of food. Instructions were given about using ample amount of fiber including dietary and supplemental fiber either metamucil, bennafiber or citrucell etc.  Pt was advised about drinking ample amount of water without any colors or chemicals. Stress was given about regular exercise. Pt has verbalized understanding and agreement to these modifications.       The patient was instructed to start taking some OTC Probiotics products   These are available over the counter at the Pharmacy stores and Grocery stores  He was explained about the beneficial effects they have in the GI track  They will help to establish the good bacterial talat and will help with the digestion and bowel movements  The patient has verbalized understanding and agreement to this plan

## 2019-09-20 ENCOUNTER — OFFICE VISIT (OUTPATIENT)
Dept: INTERNAL MEDICINE CLINIC | Age: 84
End: 2019-09-20
Payer: MEDICARE

## 2019-09-20 VITALS
SYSTOLIC BLOOD PRESSURE: 130 MMHG | HEIGHT: 70 IN | OXYGEN SATURATION: 98 % | HEART RATE: 68 BPM | WEIGHT: 228.2 LBS | DIASTOLIC BLOOD PRESSURE: 80 MMHG | BODY MASS INDEX: 32.67 KG/M2 | RESPIRATION RATE: 17 BRPM

## 2019-09-20 DIAGNOSIS — I10 ESSENTIAL HYPERTENSION: ICD-10-CM

## 2019-09-20 DIAGNOSIS — M50.20 CERVICAL DISCOGENIC PAIN SYNDROME: ICD-10-CM

## 2019-09-20 DIAGNOSIS — M48.062 SPINAL STENOSIS OF LUMBAR REGION WITH NEUROGENIC CLAUDICATION: Chronic | ICD-10-CM

## 2019-09-20 DIAGNOSIS — Z86.79 H/O INTRACRANIAL HEMORRHAGE: ICD-10-CM

## 2019-09-20 DIAGNOSIS — Z98.890 HISTORY OF CRANIOTOMY: ICD-10-CM

## 2019-09-20 DIAGNOSIS — M51.26 LUMBAR DISCOGENIC PAIN SYNDROME: ICD-10-CM

## 2019-09-20 DIAGNOSIS — G89.4 CHRONIC PAIN SYNDROME: ICD-10-CM

## 2019-09-20 DIAGNOSIS — D52.9 ANEMIA DUE TO FOLIC ACID DEFICIENCY, UNSPECIFIED DEFICIENCY TYPE: ICD-10-CM

## 2019-09-20 DIAGNOSIS — F39 MOOD DISORDER (HCC): ICD-10-CM

## 2019-09-20 DIAGNOSIS — N18.30 STAGE 3 CHRONIC KIDNEY DISEASE (HCC): ICD-10-CM

## 2019-09-20 DIAGNOSIS — E78.2 MIXED HYPERLIPIDEMIA: Primary | ICD-10-CM

## 2019-09-20 DIAGNOSIS — K22.70 BARRETT'S ESOPHAGUS WITHOUT DYSPLASIA: ICD-10-CM

## 2019-09-20 DIAGNOSIS — I25.10 CORONARY ARTERY DISEASE INVOLVING NATIVE CORONARY ARTERY OF NATIVE HEART WITHOUT ANGINA PECTORIS: ICD-10-CM

## 2019-09-20 PROBLEM — Z51.81 MEDICATION MONITORING ENCOUNTER: Chronic | Status: RESOLVED | Noted: 2018-01-23 | Resolved: 2019-09-20

## 2019-09-20 PROCEDURE — 1036F TOBACCO NON-USER: CPT | Performed by: FAMILY MEDICINE

## 2019-09-20 PROCEDURE — 99214 OFFICE O/P EST MOD 30 MIN: CPT | Performed by: FAMILY MEDICINE

## 2019-09-20 PROCEDURE — G8427 DOCREV CUR MEDS BY ELIG CLIN: HCPCS | Performed by: FAMILY MEDICINE

## 2019-09-20 PROCEDURE — 4040F PNEUMOC VAC/ADMIN/RCVD: CPT | Performed by: FAMILY MEDICINE

## 2019-09-20 PROCEDURE — 1123F ACP DISCUSS/DSCN MKR DOCD: CPT | Performed by: FAMILY MEDICINE

## 2019-09-20 PROCEDURE — G8417 CALC BMI ABV UP PARAM F/U: HCPCS | Performed by: FAMILY MEDICINE

## 2019-09-20 PROCEDURE — G8598 ASA/ANTIPLAT THER USED: HCPCS | Performed by: FAMILY MEDICINE

## 2019-09-20 ASSESSMENT — ENCOUNTER SYMPTOMS
ALLERGIC/IMMUNOLOGIC NEGATIVE: 1
EYES NEGATIVE: 1
RESPIRATORY NEGATIVE: 1
GASTROINTESTINAL NEGATIVE: 1
BACK PAIN: 1

## 2019-10-07 RX ORDER — OMEPRAZOLE 20 MG/1
CAPSULE, DELAYED RELEASE ORAL
Qty: 90 CAPSULE | Refills: 0 | OUTPATIENT
Start: 2019-10-07

## 2019-10-09 ENCOUNTER — TELEPHONE (OUTPATIENT)
Dept: GASTROENTEROLOGY | Age: 84
End: 2019-10-09

## 2019-10-09 RX ORDER — OMEPRAZOLE 20 MG/1
CAPSULE, DELAYED RELEASE ORAL
Qty: 90 CAPSULE | Refills: 2 | OUTPATIENT
Start: 2019-10-09 | End: 2020-02-19 | Stop reason: SDUPTHER

## 2019-10-16 ENCOUNTER — HOSPITAL ENCOUNTER (OUTPATIENT)
Age: 84
Discharge: HOME | End: 2019-10-16
Payer: MEDICARE

## 2019-10-16 LAB
ABSOLUTE EOS #: 0.15 K/UL (ref 0–0.44)
ABSOLUTE IMMATURE GRANULOCYTE: 0.06 K/UL (ref 0–0.3)
ABSOLUTE LYMPH #: 2.15 K/UL (ref 1.1–3.7)
ABSOLUTE MONO #: 0.9 K/UL (ref 0.1–1.2)
ALBUMIN SERPL-MCNC: 4.3 G/DL (ref 3.5–5.2)
ALBUMIN/GLOBULIN RATIO: 1.3 (ref 1–2.5)
ALP BLD-CCNC: 54 U/L (ref 40–129)
ALT SERPL-CCNC: 13 U/L (ref 5–41)
ANION GAP SERPL CALCULATED.3IONS-SCNC: 16 MMOL/L (ref 9–17)
AST SERPL-CCNC: 19 U/L
BASOPHILS # BLD: 1 % (ref 0–2)
BASOPHILS ABSOLUTE: 0.05 K/UL (ref 0–0.2)
BILIRUB SERPL-MCNC: 0.4 MG/DL (ref 0.3–1.2)
BUN BLDV-MCNC: 28 MG/DL (ref 8–23)
BUN/CREAT BLD: ABNORMAL (ref 9–20)
CALCIUM SERPL-MCNC: 9.2 MG/DL (ref 8.6–10.4)
CHLORIDE BLD-SCNC: 108 MMOL/L (ref 98–107)
CHOLESTEROL, FASTING: 135 MG/DL
CHOLESTEROL/HDL RATIO: 3.5
CO2: 19 MMOL/L (ref 20–31)
CREAT SERPL-MCNC: 1.26 MG/DL (ref 0.7–1.2)
DIFFERENTIAL TYPE: ABNORMAL
EOSINOPHILS RELATIVE PERCENT: 2 % (ref 1–4)
GFR AFRICAN AMERICAN: >60 ML/MIN
GFR NON-AFRICAN AMERICAN: 55 ML/MIN
GFR SERPL CREATININE-BSD FRML MDRD: ABNORMAL ML/MIN/{1.73_M2}
GFR SERPL CREATININE-BSD FRML MDRD: ABNORMAL ML/MIN/{1.73_M2}
GLUCOSE FASTING: 95 MG/DL (ref 70–99)
HCT VFR BLD CALC: 43.7 % (ref 40.7–50.3)
HDLC SERPL-MCNC: 39 MG/DL
HEMOGLOBIN: 13.6 G/DL (ref 13–17)
IMMATURE GRANULOCYTES: 1 %
LDL CHOLESTEROL: 60 MG/DL (ref 0–130)
LYMPHOCYTES # BLD: 26 % (ref 24–43)
MCH RBC QN AUTO: 30.8 PG (ref 25.2–33.5)
MCHC RBC AUTO-ENTMCNC: 31.1 G/DL (ref 28.4–34.8)
MCV RBC AUTO: 99.1 FL (ref 82.6–102.9)
MONOCYTES # BLD: 11 % (ref 3–12)
NRBC AUTOMATED: 0 PER 100 WBC
PDW BLD-RTO: 13.5 % (ref 11.8–14.4)
PLATELET # BLD: 227 K/UL (ref 138–453)
PLATELET ESTIMATE: ABNORMAL
PMV BLD AUTO: 9.4 FL (ref 8.1–13.5)
POTASSIUM SERPL-SCNC: 4.9 MMOL/L (ref 3.7–5.3)
RBC # BLD: 4.41 M/UL (ref 4.21–5.77)
RBC # BLD: ABNORMAL 10*6/UL
SEG NEUTROPHILS: 59 % (ref 36–65)
SEGMENTED NEUTROPHILS ABSOLUTE COUNT: 4.86 K/UL (ref 1.5–8.1)
SODIUM BLD-SCNC: 143 MMOL/L (ref 135–144)
TOTAL CK: 76 U/L (ref 39–308)
TOTAL PROTEIN: 7.5 G/DL (ref 6.4–8.3)
TRIGLYCERIDE, FASTING: 181 MG/DL
VLDLC SERPL CALC-MCNC: ABNORMAL MG/DL (ref 1–30)
WBC # BLD: 8.2 K/UL (ref 3.5–11.3)
WBC # BLD: ABNORMAL 10*3/UL

## 2019-10-16 PROCEDURE — 80053 COMPREHEN METABOLIC PANEL: CPT

## 2019-10-16 PROCEDURE — 82550 ASSAY OF CK (CPK): CPT

## 2019-10-16 PROCEDURE — 36415 COLL VENOUS BLD VENIPUNCTURE: CPT

## 2019-10-16 PROCEDURE — 80061 LIPID PANEL: CPT

## 2019-10-16 PROCEDURE — 85025 COMPLETE CBC W/AUTO DIFF WBC: CPT

## 2020-02-19 RX ORDER — OMEPRAZOLE 20 MG/1
CAPSULE, DELAYED RELEASE ORAL
Qty: 90 CAPSULE | Refills: 1 | Status: SHIPPED | OUTPATIENT
Start: 2020-02-19 | End: 2020-06-22 | Stop reason: SDUPTHER

## 2020-06-22 ENCOUNTER — OFFICE VISIT (OUTPATIENT)
Dept: INTERNAL MEDICINE CLINIC | Age: 85
End: 2020-06-22
Payer: MEDICARE

## 2020-06-22 VITALS
HEIGHT: 70 IN | RESPIRATION RATE: 16 BRPM | SYSTOLIC BLOOD PRESSURE: 120 MMHG | DIASTOLIC BLOOD PRESSURE: 64 MMHG | WEIGHT: 225.8 LBS | TEMPERATURE: 97.4 F | OXYGEN SATURATION: 97 % | BODY MASS INDEX: 32.33 KG/M2 | HEART RATE: 63 BPM

## 2020-06-22 PROBLEM — N18.30 STAGE 3 CHRONIC KIDNEY DISEASE (HCC): Status: RESOLVED | Noted: 2019-09-20 | Resolved: 2020-06-22

## 2020-06-22 PROCEDURE — G8427 DOCREV CUR MEDS BY ELIG CLIN: HCPCS | Performed by: FAMILY MEDICINE

## 2020-06-22 PROCEDURE — 4040F PNEUMOC VAC/ADMIN/RCVD: CPT | Performed by: FAMILY MEDICINE

## 2020-06-22 PROCEDURE — 1036F TOBACCO NON-USER: CPT | Performed by: FAMILY MEDICINE

## 2020-06-22 PROCEDURE — 99214 OFFICE O/P EST MOD 30 MIN: CPT | Performed by: FAMILY MEDICINE

## 2020-06-22 PROCEDURE — G8417 CALC BMI ABV UP PARAM F/U: HCPCS | Performed by: FAMILY MEDICINE

## 2020-06-22 PROCEDURE — 1123F ACP DISCUSS/DSCN MKR DOCD: CPT | Performed by: FAMILY MEDICINE

## 2020-06-22 RX ORDER — SPIRONOLACTONE 25 MG/1
25 TABLET ORAL DAILY
Qty: 90 TABLET | Refills: 1 | Status: SHIPPED | OUTPATIENT
Start: 2020-06-22

## 2020-06-22 RX ORDER — OMEPRAZOLE 20 MG/1
CAPSULE, DELAYED RELEASE ORAL
Qty: 90 CAPSULE | Refills: 1 | Status: SHIPPED | OUTPATIENT
Start: 2020-06-22 | End: 2021-03-08 | Stop reason: SDUPTHER

## 2020-06-22 RX ORDER — ISOSORBIDE MONONITRATE 30 MG/1
30 TABLET, EXTENDED RELEASE ORAL DAILY
Qty: 90 TABLET | Refills: 1 | Status: SHIPPED | OUTPATIENT
Start: 2020-06-22

## 2020-06-22 RX ORDER — METOPROLOL SUCCINATE 50 MG/1
50 TABLET, EXTENDED RELEASE ORAL DAILY
Qty: 90 TABLET | Refills: 1 | Status: SHIPPED | OUTPATIENT
Start: 2020-06-22

## 2020-06-22 RX ORDER — SPIRONOLACTONE 25 MG/1
TABLET ORAL
COMMUNITY
Start: 2020-04-10 | End: 2020-06-22 | Stop reason: SDUPTHER

## 2020-06-22 ASSESSMENT — ENCOUNTER SYMPTOMS
BACK PAIN: 1
ALLERGIC/IMMUNOLOGIC NEGATIVE: 1
EYES NEGATIVE: 1
RESPIRATORY NEGATIVE: 1
GASTROINTESTINAL NEGATIVE: 1

## 2020-06-22 ASSESSMENT — PATIENT HEALTH QUESTIONNAIRE - PHQ9
SUM OF ALL RESPONSES TO PHQ QUESTIONS 1-9: 0
2. FEELING DOWN, DEPRESSED OR HOPELESS: 0
SUM OF ALL RESPONSES TO PHQ QUESTIONS 1-9: 0
1. LITTLE INTEREST OR PLEASURE IN DOING THINGS: 0
SUM OF ALL RESPONSES TO PHQ9 QUESTIONS 1 & 2: 0

## 2020-06-22 NOTE — PROGRESS NOTES
Assessment:       Diagnosis Orders   1. Spinal stenosis of lumbar region with neurogenic claudication     2. Mixed hyperlipidemia  CBC    Comprehensive Metabolic Panel    Urinalysis with Microscopic    TSH without Reflex   3. Essential hypertension  CBC    Comprehensive Metabolic Panel    Urinalysis with Microscopic    TSH without Reflex   4. Failure of penile implant, subsequent encounter     5. Chronic kidney disease (CKD) stage G3a/A1, moderately decreased glomerular filtration rate (GFR) between 45-59 mL/min/1.73 square meter and albuminuria creatinine ratio less than 30 mg/g ([facility])  CBC    Comprehensive Metabolic Panel    Urinalysis with Microscopic    TSH without Reflex   6. Other folate deficiency anemias     7. Coronary artery disease involving native coronary artery of native heart without angina pectoris  CBC    Comprehensive Metabolic Panel    Urinalysis with Microscopic    TSH without Reflex   8. History of craniotomy     9. H/O intracranial hemorrhage     10. 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 Cervical discogenic pain syndrome     12. Mood disorder ([facility] [address]     13. Lumbar discogenic pain syndrome     14. Stage 3 chronic kidney disease ([facility] [address]     15. Chronic pain syndrome             Plan:      81-year-old overweight  male is presented with known history of chronic back pain and neck pain with associated weakness. He ambulates with a cane. Review of records shows that he has been seen by multiple pain management in the past, however, for unknown reason he did not follow through. He would be scheduled with [facility] pain management. Fall precaution is advised. He may take over-the-counter Tylenol as needed  History of coronary artery disease. He is asymptomatic recently seen by his cardiologist  Hypertension well-controlled to Aldactone, beta-blocker  Hyperlipidemia on statin that he is tolerating well  History of Hogan's esophagus. Continue Prilosec. He is asymptomatic.   He

## 2020-06-23 ENCOUNTER — APPOINTMENT (OUTPATIENT)
Dept: GENERAL RADIOLOGY | Age: 85
End: 2020-06-23
Payer: MEDICARE

## 2020-06-23 ENCOUNTER — HOSPITAL ENCOUNTER (EMERGENCY)
Age: 85
Discharge: HOME | End: 2020-06-23
Attending: EMERGENCY MEDICINE
Payer: MEDICARE

## 2020-06-23 ENCOUNTER — APPOINTMENT (OUTPATIENT)
Dept: CT IMAGING | Age: 85
End: 2020-06-23
Payer: MEDICARE

## 2020-06-23 VITALS
SYSTOLIC BLOOD PRESSURE: 138 MMHG | TEMPERATURE: 97.8 F | RESPIRATION RATE: 16 BRPM | BODY MASS INDEX: 32.21 KG/M2 | HEIGHT: 70 IN | OXYGEN SATURATION: 98 % | DIASTOLIC BLOOD PRESSURE: 78 MMHG | HEART RATE: 68 BPM | WEIGHT: 225 LBS

## 2020-06-23 PROCEDURE — 72100 X-RAY EXAM L-S SPINE 2/3 VWS: CPT

## 2020-06-23 PROCEDURE — 73120 X-RAY EXAM OF HAND: CPT

## 2020-06-23 PROCEDURE — 90715 TDAP VACCINE 7 YRS/> IM: CPT | Performed by: EMERGENCY MEDICINE

## 2020-06-23 PROCEDURE — 73600 X-RAY EXAM OF ANKLE: CPT

## 2020-06-23 PROCEDURE — 72125 CT NECK SPINE W/O DYE: CPT

## 2020-06-23 PROCEDURE — 90471 IMMUNIZATION ADMIN: CPT | Performed by: EMERGENCY MEDICINE

## 2020-06-23 PROCEDURE — 73200 CT UPPER EXTREMITY W/O DYE: CPT

## 2020-06-23 PROCEDURE — 70450 CT HEAD/BRAIN W/O DYE: CPT

## 2020-06-23 PROCEDURE — 2500000003 HC RX 250 WO HCPCS: Performed by: EMERGENCY MEDICINE

## 2020-06-23 PROCEDURE — 70486 CT MAXILLOFACIAL W/O DYE: CPT

## 2020-06-23 PROCEDURE — 73562 X-RAY EXAM OF KNEE 3: CPT

## 2020-06-23 PROCEDURE — 99284 EMERGENCY DEPT VISIT MOD MDM: CPT

## 2020-06-23 PROCEDURE — 72170 X-RAY EXAM OF PELVIS: CPT

## 2020-06-23 PROCEDURE — 73090 X-RAY EXAM OF FOREARM: CPT

## 2020-06-23 PROCEDURE — 6360000002 HC RX W HCPCS: Performed by: EMERGENCY MEDICINE

## 2020-06-23 RX ORDER — LIDOCAINE HYDROCHLORIDE 10 MG/ML
20 INJECTION, SOLUTION INFILTRATION; PERINEURAL ONCE
Status: COMPLETED | OUTPATIENT
Start: 2020-06-23 | End: 2020-06-23

## 2020-06-23 RX ADMIN — TETANUS TOXOID, REDUCED DIPHTHERIA TOXOID AND ACELLULAR PERTUSSIS VACCINE, ADSORBED 0.5 ML: 5; 2.5; 8; 8; 2.5 SUSPENSION INTRAMUSCULAR at 05:12

## 2020-06-23 RX ADMIN — LIDOCAINE HYDROCHLORIDE 20 ML: 10 INJECTION, SOLUTION INFILTRATION; PERINEURAL at 04:53

## 2020-06-23 ASSESSMENT — PAIN SCALES - GENERAL
PAINLEVEL_OUTOF10: 7
PAINLEVEL_OUTOF10: 7

## 2020-06-23 ASSESSMENT — PAIN DESCRIPTION - LOCATION: LOCATION: ARM

## 2020-06-23 NOTE — ED PROVIDER NOTES
times dailyHistorical Med      aspirin 81 MG tablet Take 81 mg by mouth daily      clopidogrel (PLAVIX) 75 MG tablet Take 75 mg by mouth daily      atorvastatin (LIPITOR) 20 MG tablet Historical Med      !! spironolactone (ALDACTONE) 50 MG tablet 25 mg , R-0       !! - Potential duplicate medications found. Please discuss with provider. PAST MEDICAL HISTORY         Diagnosis Date    Acute renal failure (ARF) ([facility] [address] 1/16/2016    Bleeding [facility] brain [facility])     after fall    Coronary artery disease involving native coronary artery of native heart without angina pectoris 1/28/2016    Gastritis     Heart attack ([facility] [address]     History of blood transfusion     Hyperlipidemia     Hypertension     Lumbar spinal stenosis 11/8/2017    Osteoarthritis     Rectal bleeding        SURGICAL HISTORY           Procedure Laterality Date    APPENDECTOMY      BACK SURGERY  40 YEARS AGO    CORONARY ANGIOPLASTY WITH STENT PLACEMENT      HAND SURGERY Left     HERNIA REPAIR      OTHER SURGICAL HISTORY      scalp surgery    UPPER GASTROINTESTINAL ENDOSCOPY  01/18/16    UPPER GASTROINTESTINAL ENDOSCOPY  1/18/2016    large and deep ulcer duodenal bulb, minimal bleeding    UPPER GASTROINTESTINAL ENDOSCOPY  3/16/16    ,ild antritis, signs of healed ulcer scar noted         FAMILY HISTORY     History reviewed. No pertinent family history. No family status information on file. SOCIAL HISTORY      reports that he has never smoked. He has never used smokeless tobacco. He reports that he does not drink alcohol or use drugs. REVIEW OF SYSTEMS    (2-9 systems for level 4, 10 or more for level 5)     Review of Systems   All other systems reviewed and are negative. Except as noted above the remainder of the review of systems was reviewed and negative.      PHYSICAL EXAM    (up to 7 for level 4, 8 or more for level 5)     Vitals:    06/23/20 0445   BP: 138/78   Pulse: 68   Resp: 16   Temp: 97.8 °F (36.6 °C)   TempSrc: Non-plain film images such as CT, Ultrasound and MRI are read by the radiologist. Plain radiographic images are visualized and preliminarily interpreted by the emergency physician with the below findings:      Interpretation per the Radiologist below, if available at the time of this note:    CT WRIST LEFT WO CONTRAST   Final Result   No fractures are identified. Specifically no evidence for scaphoid fracture. Shallow soft tissue defect to the ulnar aspect of the wrist with underlying   fat stranding along with a few small foci of gas, likely traumatic in   etiology with laceration. No focal fluid collection or radiopaque foreign   bodies. Widened scapholunate joint space concerning for ligamentous injury, uncertain   acuity. Degenerative changes to the wrist.      Multiple small loose bodies and/or some chondrocalcinosis to the wrist.      Diffuse bone demineralization. CT Head WO Contrast   Final Result   1. No evidence of acute intracranial trauma. 2. Left greater than right bilateral frontotemporal extra-axial hygromas   versus chronic subdural hemorrhage without associated significant mass effect. 3. Left frontal craniotomy postoperative change. 4. No evidence of acute maxillofacial fracture. CT Cervical Spine WO Contrast   Final Result   No acute abnormality of the cervical spine with advanced multilevel   degenerative changes as described. CT MAXILLOFACIAL WO CONTRAST   Final Result   1. No evidence of acute intracranial trauma. 2. Left greater than right bilateral frontotemporal extra-axial hygromas   versus chronic subdural hemorrhage without associated significant mass effect. 3. Left frontal craniotomy postoperative change. 4. No evidence of acute maxillofacial fracture. XR RADIUS ULNA LEFT (2 VIEWS)   Final Result   1. Question of distracted cortical acute fracture involving the dorsal   scaphoid bone.   Recommend dedicated left wrist radiographic study with   scaphoid view for further evaluation. 2. Moderate wrist joint degenerative changes, as discussed above. XR PELVIS (1-2 VIEWS)   Final Result   No radiographic evidence of acute pelvic trauma. XR LUMBAR SPINE (2-3 VIEWS)   Final Result   1. No radiographic evidence of acute lumbar spine trauma. 2. Multilevel lumbar spine mild-to-moderate spondylosis. 3. L3/L4-L5/S1 moderate facet degenerative arthropathy. 4. Inferior lumbar spine mild levoscoliosis, as described above. XR KNEE LEFT (3 VIEWS)   Final Result   Unremarkable radiographic views of the left knee. XR HAND LEFT (2 VIEWS)   Final Result   1. No radiographic evidence of acute left hand trauma. 2. Suspected proximal 4th metacarpal diaphyseal healed fracture. 3. Mild-to-moderate hand joint degenerative changes, as discussed above. XR ANKLE LEFT (2 VIEWS)   Final Result   Unremarkable radiographic views of the left ankle. LABS:  Labs Reviewed - No data to display    All other labs were within normal range or not returned as of this dictation. EMERGENCY DEPARTMENT COURSE and DIFFERENTIAL DIAGNOSIS/MDM:   Vitals:    Vitals:    06/23/20 0445   BP: 138/78   Pulse: 68   Resp: 16   Temp: 97.8 °F (36.6 °C)   TempSrc: Oral   SpO2: 98%   Weight: 225 lb (102.1 kg)   Height: 5' 10\" (1.778 m)     Patient is evaluated. Wound is cleansed and closed. Tetanus shot is updated. Imaging studies are obtained of all patient's areas of discomfort to include CT head neck and face. His exam does not reflect arterial deficit with regard to his ulnar laceration. 5:54 AM EDT  Patient's wound had been closed. Imaging studies of all of patient's areas of discomfort and abnormal findings ordered at 5:15. Awaiting images at this time. 6:15 AM EDT  Patient returns from imaging.       Care is turned over to Dr. Anum Holguin at shift change for review of remaining investigations, additional care

## 2020-06-23 NOTE — ED PROVIDER NOTES
[address] [place] [address] ED  EMERGENCY DEPARTMENT ENCOUNTER       Pt Name: Fernando Pineda  MRN: [id number]  [place] 1935  Date of evaluation: 6/23/20       Fernando Pineda is a 82 y.o. male who presents with Fall      MDM:     82-year-old male presents with complaints of fall, we are awaiting CT of the wrist.    8:00 AM EDT  Patient's CT scans and x-rays showed no evidence of acute traumatic pathology. Patient has some chronic findings in his brain, I question the patient regarding the left wrist, he denies any significant pain, at this time I do not believe this is an acute ligamentous injury and may be chronic. Recommend outpatient follow-up with his primary care and return if symptoms worsen or change. Impression: Wrist laceration, multiple contusions.      Vitals:   Vitals:    06/23/20 0445   BP: 138/78   Pulse: 68   Resp: 16   Temp: 97.8 °F (36.6 °C)   TempSrc: Oral   SpO2: 98%   Weight: 225 lb (102.1 kg)   Height: 5' 10\" (1.778 m)             Eula Soto MD  Attending Emergency  Physician                  Leonarda Lopez MD  06/23/20 3518

## 2020-06-29 ENCOUNTER — TELEPHONE (OUTPATIENT)
Dept: FAMILY MEDICINE CLINIC | Age: 85
End: 2020-06-29

## 2020-06-29 NOTE — TELEPHONE ENCOUNTER
[place] was contacted to set up an annual wellness visit    Spoke with: pt needs to schedule awv       Cassie Jiménez

## 2020-06-30 ENCOUNTER — OFFICE VISIT (OUTPATIENT)
Dept: INTERNAL MEDICINE CLINIC | Age: 85
End: 2020-06-30
Payer: MEDICARE

## 2020-06-30 VITALS
OXYGEN SATURATION: 97 % | DIASTOLIC BLOOD PRESSURE: 70 MMHG | RESPIRATION RATE: 18 BRPM | TEMPERATURE: 97 F | SYSTOLIC BLOOD PRESSURE: 130 MMHG | BODY MASS INDEX: 32.21 KG/M2 | HEIGHT: 70 IN | HEART RATE: 67 BPM | WEIGHT: 225 LBS

## 2020-06-30 PROBLEM — S61.512A WRIST LACERATION, LEFT, INITIAL ENCOUNTER: Status: ACTIVE | Noted: 2020-06-30

## 2020-06-30 PROBLEM — R26.89 IMBALANCE: Status: ACTIVE | Noted: 2020-06-30

## 2020-06-30 PROCEDURE — 1036F TOBACCO NON-USER: CPT | Performed by: FAMILY MEDICINE

## 2020-06-30 PROCEDURE — G8417 CALC BMI ABV UP PARAM F/U: HCPCS | Performed by: FAMILY MEDICINE

## 2020-06-30 PROCEDURE — 1123F ACP DISCUSS/DSCN MKR DOCD: CPT | Performed by: FAMILY MEDICINE

## 2020-06-30 PROCEDURE — 99214 OFFICE O/P EST MOD 30 MIN: CPT | Performed by: FAMILY MEDICINE

## 2020-06-30 PROCEDURE — G8427 DOCREV CUR MEDS BY ELIG CLIN: HCPCS | Performed by: FAMILY MEDICINE

## 2020-06-30 PROCEDURE — 4040F PNEUMOC VAC/ADMIN/RCVD: CPT | Performed by: FAMILY MEDICINE

## 2020-06-30 ASSESSMENT — ENCOUNTER SYMPTOMS
BACK PAIN: 1
EYES NEGATIVE: 1
GASTROINTESTINAL NEGATIVE: 1
ALLERGIC/IMMUNOLOGIC NEGATIVE: 1
RESPIRATORY NEGATIVE: 1

## 2020-06-30 NOTE — PROGRESS NOTES
Subjective:      Patient ID: Devan Breen is a 82 y.o. male. Fall   The accident occurred 5 to 7 days ago. The fall occurred while walking. He fell from a height of 3 to 5 ft. He landed on hard floor. Point of impact: Bilateral upper extremity with laceration of left lateral wrist. Pain location: Left wrist improving. The pain is at a severity of 5/10. The pain is moderate. The symptoms are aggravated by use of injured limb. He has tried elevation and rest for the symptoms. The treatment provided moderate relief. Review of Systems   Constitutional: Negative. HENT: Negative. Eyes: Negative. Respiratory: Negative. Cardiovascular: Negative. Gastrointestinal: Negative. Endocrine: Negative. Musculoskeletal: Positive for arthralgias, back pain, gait problem and myalgias. Skin: Negative. Allergic/Immunologic: Negative. Hematological: Negative. Psychiatric/Behavioral: The patient is nervous/anxious. Past family and social history unremarkable. Diagnosis Orders   1. Fall, initial encounter     2. Spinal stenosis of lumbar region with neurogenic claudication     3. Wrist laceration, left, initial encounter     4. Contusion of multiple sites of right upper extremity, initial encounter     5. Mixed hyperlipidemia     6. Essential hypertension     7. Failure of penile implant, sequela     8. Chronic kidney disease (CKD) stage G3a/A1, moderately decreased glomerular filtration rate (GFR) between 45-59 mL/min/1.73 square meter and albuminuria creatinine ratio less than 30 mg/g (HCC)     9. 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 Coronary artery disease involving native coronary artery of native heart without angina pectoris     11. History of craniotomy     12. H/O intracranial hemorrhage     13. 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 Cervical discogenic pain syndrome     15. Mood disorder ([facility] [address]     16. Lumbar discogenic pain syndrome     17. extremity, initial encounter     5. Mixed hyperlipidemia     6. Essential hypertension     7. Failure of penile implant, sequela     8. Chronic kidney disease (CKD) stage G3a/A1, moderately decreased glomerular filtration rate (GFR) between 45-59 mL/min/1.73 square meter and albuminuria creatinine ratio less than 30 mg/g (HCC)     9. 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 Coronary artery disease involving native coronary artery of native heart without angina pectoris     11. History of craniotomy     12. H/O intracranial hemorrhage     13. 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 Cervical discogenic pain syndrome     15. Mood disorder ([facility] [address]     16. Lumbar discogenic pain syndrome     17. Chronic pain syndrome     18. Imbalance             Plan:      24-year-old male with underlying history of spinal stenosis with associated imbalance for which he is established with pain management was at home in his usual setting while he tripped on his cat sustained a fall with bruising and abrasion bilateral upper extremity. He denies any head and neck injury. He was seen in the emergency room and underwent repair of the laceration over the left distal ulna that appears to be healing well. Bruising and contusions are also seen to have signs of progressive healing. Pain is well controlled. He is advised to return in 1 week for removal of sutures. In the meantime, keep 4 x 4 gauze dressing coverage  I advised him to rather use his walker rather than single-point cane to prevent further falls. Safety counseling including but not limited to carbon monoxide/Virilon, ambulating, avoiding loose clutter and staying in familiar environment. History of traumatic intracranial hemorrhage with history of craniotomy. Neurologically intact  Coronary artery disease. No recent decompensation.   He is established with cardiology  Hypertension well controlled to beta-blocker and

## 2020-07-08 ENCOUNTER — NURSE ONLY (OUTPATIENT)
Dept: INTERNAL MEDICINE CLINIC | Age: 85
End: 2020-07-08

## 2020-10-12 ENCOUNTER — TELEPHONE (OUTPATIENT)
Dept: INTERNAL MEDICINE CLINIC | Age: 85
End: 2020-10-12

## 2020-10-12 NOTE — TELEPHONE ENCOUNTER
Patient asking for a handicapped placard?     Patient knows Dr. Pascale Rhodes is out of the office until next week    Please call patient when ready

## 2020-12-21 ENCOUNTER — OFFICE VISIT (OUTPATIENT)
Dept: INTERNAL MEDICINE CLINIC | Age: 85
End: 2020-12-21
Payer: MEDICARE

## 2020-12-21 VITALS
HEIGHT: 70 IN | TEMPERATURE: 97.9 F | RESPIRATION RATE: 24 BRPM | HEART RATE: 72 BPM | BODY MASS INDEX: 32.5 KG/M2 | WEIGHT: 227 LBS | SYSTOLIC BLOOD PRESSURE: 138 MMHG | DIASTOLIC BLOOD PRESSURE: 70 MMHG

## 2020-12-21 PROBLEM — S61.512A WRIST LACERATION, LEFT, INITIAL ENCOUNTER: Status: RESOLVED | Noted: 2020-06-30 | Resolved: 2020-12-21

## 2020-12-21 PROBLEM — R26.89 IMBALANCE: Status: RESOLVED | Noted: 2020-06-30 | Resolved: 2020-12-21

## 2020-12-21 PROCEDURE — 4040F PNEUMOC VAC/ADMIN/RCVD: CPT | Performed by: FAMILY MEDICINE

## 2020-12-21 PROCEDURE — G8484 FLU IMMUNIZE NO ADMIN: HCPCS | Performed by: FAMILY MEDICINE

## 2020-12-21 PROCEDURE — G8427 DOCREV CUR MEDS BY ELIG CLIN: HCPCS | Performed by: FAMILY MEDICINE

## 2020-12-21 PROCEDURE — G8417 CALC BMI ABV UP PARAM F/U: HCPCS | Performed by: FAMILY MEDICINE

## 2020-12-21 PROCEDURE — 1036F TOBACCO NON-USER: CPT | Performed by: FAMILY MEDICINE

## 2020-12-21 PROCEDURE — 1123F ACP DISCUSS/DSCN MKR DOCD: CPT | Performed by: FAMILY MEDICINE

## 2020-12-21 PROCEDURE — 99214 OFFICE O/P EST MOD 30 MIN: CPT | Performed by: FAMILY MEDICINE

## 2020-12-21 ASSESSMENT — ENCOUNTER SYMPTOMS
EYES NEGATIVE: 1
BACK PAIN: 1
ALLERGIC/IMMUNOLOGIC NEGATIVE: 1
RESPIRATORY NEGATIVE: 1
GASTROINTESTINAL NEGATIVE: 1

## 2020-12-21 NOTE — PROGRESS NOTES
Subjective:      Patient ID: Hammad Luevano is a 82 y.o. male. Hypertension  This is a chronic problem. The current episode started more than 1 month ago. The problem is unchanged. The problem is controlled. Associated symptoms include anxiety. Risk factors for coronary artery disease include stress, obesity, male gender, dyslipidemia and sedentary lifestyle. Past treatments include calcium channel blockers and diuretics. The current treatment provides moderate improvement. Compliance problems include exercise, diet and psychosocial issues. Hypertensive end-organ damage includes CAD/MI. Identifiable causes of hypertension include sleep apnea. Review of Systems   Constitutional: Negative. HENT: Negative. Eyes: Negative. Respiratory: Negative. Cardiovascular: Negative. Gastrointestinal: Negative. Endocrine: Negative. Musculoskeletal: Positive for arthralgias, back pain, gait problem and myalgias. Skin: Negative. Allergic/Immunologic: Negative. Hematological: Negative. Psychiatric/Behavioral: The patient is nervous/anxious. Past family and social history unremarkable. Diagnosis Orders   1. Spinal stenosis of lumbar region with neurogenic claudication     2. Mixed hyperlipidemia  Lipid Panel    CBC    Comprehensive Metabolic Panel    Hemoglobin A1C    Lipid Panel    Urinalysis with Microscopic    TSH without Reflex    Psa screening   3. Essential hypertension  Lipid Panel    CBC    Comprehensive Metabolic Panel    Hemoglobin A1C    Lipid Panel    Urinalysis with Microscopic    TSH without Reflex    Psa screening   4. Failure of penile implant, sequela     5. Chronic kidney disease (CKD) stage G3a/A1, moderately decreased glomerular filtration rate (GFR) between 45-59 mL/min/1.73 square meter and albuminuria creatinine ratio less than 30 mg/g     6.  Anemia due to folic acid deficiency, unspecified deficiency type  Lipid Panel    CBC    Comprehensive Metabolic Panel Psychiatric:      Comments: Poor concentration  Reluctance to lab and regular follow-up with consults         Assessment:       Diagnosis Orders   1. Spinal stenosis of lumbar region with neurogenic claudication     2. Mixed hyperlipidemia  Lipid Panel    CBC    Comprehensive Metabolic Panel    Hemoglobin A1C    Lipid Panel    Urinalysis with Microscopic    TSH without Reflex    Psa screening   3. Essential hypertension  Lipid Panel    CBC    Comprehensive Metabolic Panel    Hemoglobin A1C    Lipid Panel    Urinalysis with Microscopic    TSH without Reflex    Psa screening   4. Failure of penile implant, sequela     5. Chronic kidney disease (CKD) stage G3a/A1, moderately decreased glomerular filtration rate (GFR) between 45-59 mL/min/1.73 square meter and albuminuria creatinine ratio less than 30 mg/g     6. Anemia due to folic acid deficiency, unspecified deficiency type  Lipid Panel    CBC    Comprehensive Metabolic Panel    Hemoglobin A1C    Lipid Panel    Urinalysis with Microscopic    TSH without Reflex    Psa screening   7. Coronary artery disease involving native coronary artery of native heart without angina pectoris  Lipid Panel    CBC    Comprehensive Metabolic Panel    Hemoglobin A1C    Lipid Panel    Urinalysis with Microscopic    TSH without Reflex    Psa screening   8. History of craniotomy     9. H/O intracranial hemorrhage     10. 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 Cervical discogenic pain syndrome     12. Mood disorder ([address]     13. Lumbar discogenic pain syndrome     14. Chronic pain syndrome             Plan:      60-year-old  male returns with his wife for routine follow-up. He does not voice any distress, afebrile hemodynamically stable  Coronary artery disease. He is established however noncompliant with cardiology follow-ups.   Compliance is advised Hypertension well-controlled to beta-blocker and diuretic that he is tolerating well. Consume less than 2 g of salt a day, low-fat high-fiber diet  Hyperlipidemia on statin that he is tolerating well  Spine stenosis without any sign of myelopathy. He is established however noncompliant with pain management follow-up. He ambulates with a walker. Fall precaution is advised  Chronic anemia H&H is stable as of last year. History of intracranial hemorrhage. .  No apparent seizure history. He is reluctant to follow-up with neurology. He denies tobacco, excessive alcohol or illicit drug use   Hearing impairment. I strongly suggest that he get his hearing checked that he declined  Depression with poor concentration. I advised him to consider SSRI and psych consultation that he declined. He denies suicidality. He is reluctant to any labs  I offered home health care, medical transportation that he declined  History of GERD/Hogan's esophagus. He is asymptomatic. Once again he will benefit from follow-up with GI per plan  Med list reviewed advised to continue  This note is created with a voice recognition program and while intend to generate a document that accurately reflects the content of the visit, no guarantee can be provided that every mistake has been identified and corrected by editing.        Inez Alexis MD

## 2021-03-05 NOTE — TELEPHONE ENCOUNTER
Karla Keys is calling to request a refill on the following medication(s):    Medication Request:  Requested Prescriptions     Pending Prescriptions Disp Refills    omeprazole (PRILOSEC) 20 MG delayed release capsule 90 capsule 1     Sig: take 1 capsule by mouth daily       Last Visit Date (If Applicable):  40/82/1093    Next Visit Date:    6/21/2021

## 2021-03-08 RX ORDER — OMEPRAZOLE 20 MG/1
CAPSULE, DELAYED RELEASE ORAL
Qty: 90 CAPSULE | Refills: 1 | Status: SHIPPED | OUTPATIENT
Start: 2021-03-08 | End: 2021-06-21 | Stop reason: SDUPTHER

## 2021-05-24 ENCOUNTER — HOSPITAL ENCOUNTER (OUTPATIENT)
Age: 86
Discharge: HOME | End: 2021-05-24
Payer: MEDICARE

## 2021-05-24 LAB
ALBUMIN SERPL-MCNC: 4 G/DL (ref 3.5–5.2)
ALBUMIN/GLOBULIN RATIO: 1.4 (ref 1–2.5)
ALP BLD-CCNC: 54 U/L (ref 40–129)
ALT SERPL-CCNC: 8 U/L (ref 5–41)
ANION GAP SERPL CALCULATED.3IONS-SCNC: 12 MMOL/L (ref 9–17)
AST SERPL-CCNC: 17 U/L
BILIRUB SERPL-MCNC: 0.49 MG/DL (ref 0.3–1.2)
BUN BLDV-MCNC: 28 MG/DL (ref 8–23)
BUN/CREAT BLD: ABNORMAL (ref 9–20)
CALCIUM SERPL-MCNC: 8.8 MG/DL (ref 8.6–10.4)
CHLORIDE BLD-SCNC: 108 MMOL/L (ref 98–107)
CHOLESTEROL, FASTING: 130 MG/DL
CHOLESTEROL/HDL RATIO: 3.4
CO2: 21 MMOL/L (ref 20–31)
CREAT SERPL-MCNC: 1.32 MG/DL (ref 0.7–1.2)
GFR AFRICAN AMERICAN: >60 ML/MIN
GFR NON-AFRICAN AMERICAN: 52 ML/MIN
GFR SERPL CREATININE-BSD FRML MDRD: ABNORMAL ML/MIN/{1.73_M2}
GFR SERPL CREATININE-BSD FRML MDRD: ABNORMAL ML/MIN/{1.73_M2}
GLUCOSE FASTING: 95 MG/DL (ref 70–99)
HDLC SERPL-MCNC: 38 MG/DL
LDL CHOLESTEROL: 60 MG/DL (ref 0–130)
POTASSIUM SERPL-SCNC: 4.9 MMOL/L (ref 3.7–5.3)
SODIUM BLD-SCNC: 141 MMOL/L (ref 135–144)
TOTAL CK: 108 U/L (ref 39–308)
TOTAL PROTEIN: 6.8 G/DL (ref 6.4–8.3)
TRIGLYCERIDE, FASTING: 162 MG/DL
VLDLC SERPL CALC-MCNC: ABNORMAL MG/DL (ref 1–30)

## 2021-05-24 PROCEDURE — 82550 ASSAY OF CK (CPK): CPT

## 2021-05-24 PROCEDURE — 80061 LIPID PANEL: CPT

## 2021-05-24 PROCEDURE — 36415 COLL VENOUS BLD VENIPUNCTURE: CPT

## 2021-05-24 PROCEDURE — 80053 COMPREHEN METABOLIC PANEL: CPT

## 2021-06-21 ENCOUNTER — OFFICE VISIT (OUTPATIENT)
Dept: INTERNAL MEDICINE CLINIC | Age: 86
End: 2021-06-21
Payer: MEDICARE

## 2021-06-21 VITALS
HEART RATE: 66 BPM | RESPIRATION RATE: 16 BRPM | OXYGEN SATURATION: 98 % | DIASTOLIC BLOOD PRESSURE: 70 MMHG | WEIGHT: 228 LBS | HEIGHT: 70 IN | SYSTOLIC BLOOD PRESSURE: 130 MMHG | TEMPERATURE: 97.3 F | BODY MASS INDEX: 32.64 KG/M2

## 2021-06-21 DIAGNOSIS — K22.70 BARRETT'S ESOPHAGUS WITHOUT DYSPLASIA: ICD-10-CM

## 2021-06-21 DIAGNOSIS — F39 MOOD DISORDER (HCC): ICD-10-CM

## 2021-06-21 DIAGNOSIS — M48.062 SPINAL STENOSIS OF LUMBAR REGION WITH NEUROGENIC CLAUDICATION: Chronic | ICD-10-CM

## 2021-06-21 DIAGNOSIS — M51.26 LUMBAR DISCOGENIC PAIN SYNDROME: ICD-10-CM

## 2021-06-21 DIAGNOSIS — M50.20 CERVICAL DISCOGENIC PAIN SYNDROME: ICD-10-CM

## 2021-06-21 DIAGNOSIS — E78.2 MIXED HYPERLIPIDEMIA: ICD-10-CM

## 2021-06-21 DIAGNOSIS — T83.410S FAILURE OF PENILE IMPLANT, SEQUELA: ICD-10-CM

## 2021-06-21 DIAGNOSIS — I10 ESSENTIAL HYPERTENSION: ICD-10-CM

## 2021-06-21 DIAGNOSIS — G89.4 CHRONIC PAIN SYNDROME: ICD-10-CM

## 2021-06-21 DIAGNOSIS — M79.89 LEFT LEG SWELLING: ICD-10-CM

## 2021-06-21 DIAGNOSIS — M79.89 RIGHT LEG SWELLING: Primary | ICD-10-CM

## 2021-06-21 DIAGNOSIS — D52.8 OTHER FOLATE DEFICIENCY ANEMIAS: ICD-10-CM

## 2021-06-21 DIAGNOSIS — I25.10 CORONARY ARTERY DISEASE INVOLVING NATIVE CORONARY ARTERY OF NATIVE HEART WITHOUT ANGINA PECTORIS: ICD-10-CM

## 2021-06-21 DIAGNOSIS — N18.31 CHRONIC KIDNEY DISEASE (CKD) STAGE G3A/A1, MODERATELY DECREASED GLOMERULAR FILTRATION RATE (GFR) BETWEEN 45-59 ML/MIN/1.73 SQUARE METER AND ALBUMINURIA CREATININE RATIO LESS THAN 30 MG/G (HCC): ICD-10-CM

## 2021-06-21 DIAGNOSIS — Z86.79 H/O INTRACRANIAL HEMORRHAGE: ICD-10-CM

## 2021-06-21 DIAGNOSIS — Z98.890 HISTORY OF CRANIOTOMY: ICD-10-CM

## 2021-06-21 PROCEDURE — 99214 OFFICE O/P EST MOD 30 MIN: CPT | Performed by: FAMILY MEDICINE

## 2021-06-21 PROCEDURE — 4040F PNEUMOC VAC/ADMIN/RCVD: CPT | Performed by: FAMILY MEDICINE

## 2021-06-21 PROCEDURE — G8417 CALC BMI ABV UP PARAM F/U: HCPCS | Performed by: FAMILY MEDICINE

## 2021-06-21 PROCEDURE — 1123F ACP DISCUSS/DSCN MKR DOCD: CPT | Performed by: FAMILY MEDICINE

## 2021-06-21 PROCEDURE — 1036F TOBACCO NON-USER: CPT | Performed by: FAMILY MEDICINE

## 2021-06-21 PROCEDURE — G8427 DOCREV CUR MEDS BY ELIG CLIN: HCPCS | Performed by: FAMILY MEDICINE

## 2021-06-21 RX ORDER — CEPHALEXIN 500 MG/1
500 CAPSULE ORAL 4 TIMES DAILY
Qty: 28 CAPSULE | Refills: 0 | Status: SHIPPED | OUTPATIENT
Start: 2021-06-21 | End: 2021-06-28

## 2021-06-21 RX ORDER — OMEPRAZOLE 20 MG/1
CAPSULE, DELAYED RELEASE ORAL
Qty: 90 CAPSULE | Refills: 1 | Status: SHIPPED | OUTPATIENT
Start: 2021-06-21 | End: 2021-12-21

## 2021-06-21 RX ORDER — DOXYCYCLINE HYCLATE 100 MG
100 TABLET ORAL 2 TIMES DAILY
Qty: 14 TABLET | Refills: 0 | Status: SHIPPED | OUTPATIENT
Start: 2021-06-21 | End: 2021-06-28

## 2021-06-21 SDOH — ECONOMIC STABILITY: FOOD INSECURITY: WITHIN THE PAST 12 MONTHS, YOU WORRIED THAT YOUR FOOD WOULD RUN OUT BEFORE YOU GOT MONEY TO BUY MORE.: NEVER TRUE

## 2021-06-21 SDOH — ECONOMIC STABILITY: FOOD INSECURITY: WITHIN THE PAST 12 MONTHS, THE FOOD YOU BOUGHT JUST DIDN'T LAST AND YOU DIDN'T HAVE MONEY TO GET MORE.: NEVER TRUE

## 2021-06-21 ASSESSMENT — ENCOUNTER SYMPTOMS
ALLERGIC/IMMUNOLOGIC NEGATIVE: 1
BACK PAIN: 1
RESPIRATORY NEGATIVE: 1
GASTROINTESTINAL NEGATIVE: 1
EYES NEGATIVE: 1

## 2021-06-21 ASSESSMENT — PATIENT HEALTH QUESTIONNAIRE - PHQ9
2. FEELING DOWN, DEPRESSED OR HOPELESS: 0
SUM OF ALL RESPONSES TO PHQ QUESTIONS 1-9: 0
SUM OF ALL RESPONSES TO PHQ QUESTIONS 1-9: 0
1. LITTLE INTEREST OR PLEASURE IN DOING THINGS: 0
SUM OF ALL RESPONSES TO PHQ QUESTIONS 1-9: 0
SUM OF ALL RESPONSES TO PHQ9 QUESTIONS 1 & 2: 0

## 2021-06-21 ASSESSMENT — SOCIAL DETERMINANTS OF HEALTH (SDOH): HOW HARD IS IT FOR YOU TO PAY FOR THE VERY BASICS LIKE FOOD, HOUSING, MEDICAL CARE, AND HEATING?: NOT HARD AT ALL

## 2021-06-21 NOTE — PROGRESS NOTES
Management, [place]         Objective:   Physical Exam  Vitals and nursing note reviewed. Constitutional:       Appearance: He is well-developed. Comments: Morbid obesity with suspected sleep apnea   HENT:      Head: Normocephalic and atraumatic. Right Ear: External ear normal.      Left Ear: External ear normal.      Nose: Nose normal.   Eyes:      Conjunctiva/sclera: Conjunctivae normal.      Pupils: Pupils are equal, round, and reactive to light. Cardiovascular:      Rate and Rhythm: Normal rate and regular rhythm. Heart sounds: Normal heart sounds. Comments: Coronary artery disease  Hypertension  Hyperlipidemia  Pulmonary:      Effort: Pulmonary effort is normal.      Breath sounds: Normal breath sounds. Abdominal:      General: Bowel sounds are normal.      Palpations: Abdomen is soft. Comments: GERD  Hogan's esophagus   Genitourinary:     Comments: CKD  Penile implant  Musculoskeletal:         General: Normal range of motion. Cervical back: Normal range of motion and neck supple. Comments: Degenerative spondylosis. He is noncompliant establishing with pain management. He ambulates on a walker   Skin:     General: Skin is warm and dry. Comments: Cellulitis left lateral leg with multiple scratches   Neurological:      Mental Status: He is alert and oriented to person, place, and time. Deep Tendon Reflexes: Reflexes are normal and symmetric. Comments: Neurogenic pruritus  History of intracranial hemorrhage status post craniotomy   Psychiatric:      Comments: Decreased concentration         Assessment:       Diagnosis Orders   1. Right leg swelling  US DUP LOWER EXTREMITY RIGHT PAWEL   2. Spinal stenosis of lumbar region with neurogenic claudication  Kristy Germain MD, Pain Management, [place]   3. Mood disorder ([address]     4. Mixed hyperlipidemia     5. Essential hypertension     6. Failure of penile implant, sequela     7.  Chronic kidney disease

## 2021-07-02 ENCOUNTER — HOSPITAL ENCOUNTER (OUTPATIENT)
Dept: VASCULAR LAB | Age: 86
Discharge: HOME | End: 2021-07-02
Payer: MEDICARE

## 2021-07-02 DIAGNOSIS — M79.89 LEFT LEG SWELLING: ICD-10-CM

## 2021-07-02 DIAGNOSIS — M79.89 RIGHT LEG SWELLING: Primary | ICD-10-CM

## 2021-07-02 PROCEDURE — 93970 EXTREMITY STUDY: CPT

## 2021-08-13 ENCOUNTER — TELEPHONE (OUTPATIENT)
Dept: INTERNAL MEDICINE CLINIC | Age: 86
End: 2021-08-13

## 2021-08-13 NOTE — TELEPHONE ENCOUNTER
Patient calling to ask for a disability placcard       On Dr sierra for [employer]

## 2021-08-13 NOTE — LETTER
[facility]  [address]  Phone: [phone]  Fax: [phone]    Pam Manzo MD         August 13, 2021     Patient: Lacy Carrington   YOB: 1935   Date of Visit: 8/13/2021       To Whom It May Concern: It is my medical opinion that Matteo Gutierrez requires a disability parking placard for the following reasons:  He cannot walk 200 feet without stopping to rest.  Duration of need: 5 years    If you have any questions or concerns, please don't hesitate to call.     Sincerely,        Pam Manzo MD

## 2021-10-26 ENCOUNTER — OFFICE VISIT (OUTPATIENT)
Dept: INTERNAL MEDICINE CLINIC | Age: 86
End: 2021-10-26
Payer: MEDICARE

## 2021-10-26 VITALS
TEMPERATURE: 97.7 F | SYSTOLIC BLOOD PRESSURE: 136 MMHG | OXYGEN SATURATION: 99 % | WEIGHT: 227.6 LBS | RESPIRATION RATE: 18 BRPM | HEIGHT: 70 IN | BODY MASS INDEX: 32.58 KG/M2 | HEART RATE: 68 BPM | DIASTOLIC BLOOD PRESSURE: 60 MMHG

## 2021-10-26 DIAGNOSIS — K22.70 BARRETT'S ESOPHAGUS WITHOUT DYSPLASIA: ICD-10-CM

## 2021-10-26 DIAGNOSIS — M48.062 SPINAL STENOSIS OF LUMBAR REGION WITH NEUROGENIC CLAUDICATION: Primary | Chronic | ICD-10-CM

## 2021-10-26 DIAGNOSIS — N18.31 CHRONIC KIDNEY DISEASE (CKD) STAGE G3A/A1, MODERATELY DECREASED GLOMERULAR FILTRATION RATE (GFR) BETWEEN 45-59 ML/MIN/1.73 SQUARE METER AND ALBUMINURIA CREATININE RATIO LESS THAN 30 MG/G (HCC): ICD-10-CM

## 2021-10-26 DIAGNOSIS — F39 MOOD DISORDER (HCC): ICD-10-CM

## 2021-10-26 DIAGNOSIS — R29.818 SUSPECTED SLEEP APNEA: ICD-10-CM

## 2021-10-26 DIAGNOSIS — E78.2 MIXED HYPERLIPIDEMIA: ICD-10-CM

## 2021-10-26 DIAGNOSIS — Z86.79 H/O INTRACRANIAL HEMORRHAGE: ICD-10-CM

## 2021-10-26 DIAGNOSIS — I25.10 CORONARY ARTERY DISEASE INVOLVING NATIVE CORONARY ARTERY OF NATIVE HEART WITHOUT ANGINA PECTORIS: ICD-10-CM

## 2021-10-26 DIAGNOSIS — M51.26 LUMBAR DISCOGENIC PAIN SYNDROME: ICD-10-CM

## 2021-10-26 DIAGNOSIS — Z98.890 HISTORY OF CRANIOTOMY: ICD-10-CM

## 2021-10-26 DIAGNOSIS — I10 HYPERTENSION, UNSPECIFIED TYPE: ICD-10-CM

## 2021-10-26 DIAGNOSIS — T83.410S FAILURE OF PENILE IMPLANT, SEQUELA: ICD-10-CM

## 2021-10-26 DIAGNOSIS — M50.20 CERVICAL DISCOGENIC PAIN SYNDROME: ICD-10-CM

## 2021-10-26 DIAGNOSIS — G89.4 CHRONIC PAIN SYNDROME: ICD-10-CM

## 2021-10-26 DIAGNOSIS — D52.9 ANEMIA DUE TO FOLIC ACID DEFICIENCY, UNSPECIFIED DEFICIENCY TYPE: ICD-10-CM

## 2021-10-26 PROCEDURE — 1036F TOBACCO NON-USER: CPT | Performed by: FAMILY MEDICINE

## 2021-10-26 PROCEDURE — G8417 CALC BMI ABV UP PARAM F/U: HCPCS | Performed by: FAMILY MEDICINE

## 2021-10-26 PROCEDURE — 1123F ACP DISCUSS/DSCN MKR DOCD: CPT | Performed by: FAMILY MEDICINE

## 2021-10-26 PROCEDURE — 4040F PNEUMOC VAC/ADMIN/RCVD: CPT | Performed by: FAMILY MEDICINE

## 2021-10-26 PROCEDURE — G8484 FLU IMMUNIZE NO ADMIN: HCPCS | Performed by: FAMILY MEDICINE

## 2021-10-26 PROCEDURE — G8427 DOCREV CUR MEDS BY ELIG CLIN: HCPCS | Performed by: FAMILY MEDICINE

## 2021-10-26 PROCEDURE — 99214 OFFICE O/P EST MOD 30 MIN: CPT | Performed by: FAMILY MEDICINE

## 2021-10-26 ASSESSMENT — ENCOUNTER SYMPTOMS
GASTROINTESTINAL NEGATIVE: 1
EYES NEGATIVE: 1
RESPIRATORY NEGATIVE: 1
BACK PAIN: 1
ALLERGIC/IMMUNOLOGIC NEGATIVE: 1

## 2021-10-26 ASSESSMENT — PATIENT HEALTH QUESTIONNAIRE - PHQ9
2. FEELING DOWN, DEPRESSED OR HOPELESS: 0
SUM OF ALL RESPONSES TO PHQ QUESTIONS 1-9: 0
1. LITTLE INTEREST OR PLEASURE IN DOING THINGS: 0
SUM OF ALL RESPONSES TO PHQ QUESTIONS 1-9: 0
SUM OF ALL RESPONSES TO PHQ9 QUESTIONS 1 & 2: 0
SUM OF ALL RESPONSES TO PHQ QUESTIONS 1-9: 0

## 2021-10-26 NOTE — PROGRESS NOTES
Subjective:      Patient ID: Brinda Smith is a 82 y.o. male. Back Pain  This is a chronic problem. The current episode started more than 1 month ago. The problem occurs constantly. The problem has been waxing and waning since onset. The pain is present in the lumbar spine. The quality of the pain is described as aching and cramping. The pain does not radiate. The pain is at a severity of 5/10. The pain is moderate. The pain is the same all the time. The symptoms are aggravated by stress. Associated symptoms include weakness. Risk factors include sedentary lifestyle, obesity, poor posture and lack of exercise. He has tried bed rest and analgesics for the symptoms. The treatment provided moderate relief. Review of Systems   Constitutional: Negative. HENT: Negative. Eyes: Negative. Respiratory: Negative. Cardiovascular: Negative. Gastrointestinal: Negative. Endocrine: Negative. Musculoskeletal: Positive for arthralgias, back pain and gait problem. Skin: Negative. Allergic/Immunologic: Negative. Neurological: Positive for weakness. Hematological: Negative. Psychiatric/Behavioral: Positive for decreased concentration. Past family and social history unremarkable. Diagnosis Orders   1. Spinal stenosis of lumbar region with neurogenic claudication     2. Mixed hyperlipidemia     3. Hypertension, unspecified type     4. Chronic kidney disease (CKD) stage G3a/A1, moderately decreased glomerular filtration rate (GFR) between 45-59 mL/min/1.73 square meter and albuminuria creatinine ratio less than 30 mg/g (HCC)     5. Failure of penile implant, sequela     6. Anemia due to folic acid deficiency, unspecified deficiency type     7. Coronary artery disease involving native coronary artery of native heart without angina pectoris     8. History of craniotomy     9. H/O intracranial hemorrhage     10. 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  Cervical discogenic pain syndrome 12. Mood disorder ([facility] [address]     13. Lumbar discogenic pain syndrome     14. Chronic pain syndrome     15. Suspected sleep apnea           Objective:   Physical Exam  Vitals and nursing note reviewed. Constitutional:       Appearance: He is well-developed. Comments: Obesity with clinical sleep apnea. Reluctant to sleep study   HENT:      Head: Normocephalic and atraumatic. Right Ear: External ear normal.      Left Ear: External ear normal.      Nose: Nose normal.   Eyes:      Conjunctiva/sclera: Conjunctivae normal.      Pupils: Pupils are equal, round, and reactive to light. Cardiovascular:      Rate and Rhythm: Normal rate and regular rhythm. Heart sounds: Normal heart sounds. Comments: Coronary artery disease  Hypertension  Hyperlipidemia  Pulmonary:      Effort: Pulmonary effort is normal.      Breath sounds: Normal breath sounds. Abdominal:      General: Bowel sounds are normal.      Palpations: Abdomen is soft. Genitourinary:     Comments: History of failed penile implant  CKD  Musculoskeletal:         General: Normal range of motion. Cervical back: Normal range of motion and neck supple. Comments: Degenerative polyarthralgia. No recent flare   Skin:     General: Skin is warm and dry. Neurological:      Mental Status: He is alert and oriented to person, place, and time. Deep Tendon Reflexes: Reflexes are normal and symmetric. Comments: History of traumatic hemorrhage status post ileostomy. Neurologically intact. Psychiatric:      Comments: Poor concentration. He denies being depressed         Assessment:       Diagnosis Orders   1. Spinal stenosis of lumbar region with neurogenic claudication     2. Mixed hyperlipidemia     3. Hypertension, unspecified type     4.  Chronic kidney disease (CKD) stage G3a/A1, moderately decreased glomerular filtration rate (GFR) between 45-59 mL/min/1.73 square meter and albuminuria creatinine ratio less than 30 mg/g ([facility]) 5. Failure of penile implant, sequela     6. Anemia due to folic acid deficiency, unspecified deficiency type     7. Coronary artery disease involving native coronary artery of native heart without angina pectoris     8. History of craniotomy     9. H/O intracranial hemorrhage     10. 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 Cervical discogenic pain syndrome     12. Mood disorder ([facility] [address]     13. Lumbar discogenic pain syndrome     14. Chronic pain syndrome     15. Suspected sleep apnea             Plan:      53-year-old male came along with his wife for routine follow-up. He is afebrile hemodynamically stable  Degenerative polyarthralgia. He was scheduled to be seen by pain management. His wife tells me that he was contacted however he refused to go. Patient states that he is doing well. He ambulates with a cane. I recommend that he rather use a walker. Fall precaution is advised. May take over-the-counter Tylenol. History of coronary artery disease. He is established with cardiology. No recent decompensation. Hypertension well-controlled to Aldactone, metoprolol  He is advised to continue aspirin and Plavix, Imdur  Hyperlipidemia on statin that he is tolerating well  Morbid obesity with weight gain, sedentary lifestyle and dietary indiscretion. Risk factor stratification is advised. He declined sleep study  He referred to any questions to his wife however would not accept any recommendation. I had my  Suhail involved who also declined discussing his situation as well as with his wife. We also offered home health care that he declined  Safety counseling including but not limited to carbon monoxide/Virilon, ambulating, avoiding loose clutter and staying in familiar environment  Remote past history of intracranial hemorrhage status post craniotomy. Neurologically is intact. He denies headaches or neck pain  CKD with creatinine 1.32.   Avoid nephrotoxic drugs, anti-inflammatory radiocontrast.  Maintain oral hydration  History of anemia. Hemoglobin is 13.6 and hematocrit is 9.1. He denies epigastric symptom or tarry stool  He appears anxious however denies being depressed. He denies sign of suicidality  He is updated on Covid vaccination. He is advised to get a booster  Med list and available labs reviewed, discussed with patient, questions answered  I wanted him to return in 3 to 4 months however he states that he would like to come back in 6 months. He is encouraged to call for any concern  This note is created with a voice recognition program and while intend to generate a document that accurately reflects the content of the visit, no guarantee can be provided that every mistake has been identified and corrected by editing.           Ting Farrell MD

## 2021-10-27 ENCOUNTER — CARE COORDINATION (OUTPATIENT)
Dept: CARE COORDINATION | Age: 86
End: 2021-10-27

## 2021-10-28 NOTE — CARE COORDINATION
Patient referred to [facility] after his office visit 10/26/2021. Per Dr. Elin Cushing patient was informed he needs a sleep study and was offered referral to home care, patient declined both and deferred it to his wife - who is hard of hearing. ACM attempted to contact patient, no anser, lvm to return call to this nurse at [phone]. Patient and wife have the same contact number. If no return call, ACM will attempt to contact again next week.       Future Appointments   Date Time Provider John Colon   4/26/2022  2:30 PM Oziel Power MD [facility] KHLOE Gallegos

## 2021-11-04 ENCOUNTER — CARE COORDINATION (OUTPATIENT)
Dept: CARE COORDINATION | Age: 86
End: 2021-11-04

## 2021-11-04 NOTE — CARE COORDINATION
Ambulatory Care Coordination Note  11/4/2021  CM Risk Score: 2  Charlson 10 Year Mortality Risk Score: 100%     ACC: Elsa Judd, RN    Summary Note:  2nd Attempted to contact patient to follow up on sleep study and Aurelio Orosco, no answer, lvm to return call to this nurse at [phone]. No further outreach call per ACM, patient has contact number if needed. Will update Dr. Ayan Daly.     Future Appointments   Date Time Provider John Colon   4/26/2022  2:30 PM MD Maria Vargas

## 2021-12-14 ENCOUNTER — HOSPITAL ENCOUNTER (OUTPATIENT)
Age: 86
Discharge: HOME | End: 2021-12-14
Payer: MEDICARE

## 2021-12-14 LAB
ABSOLUTE EOS #: 0.35 K/UL (ref 0–0.44)
ABSOLUTE IMMATURE GRANULOCYTE: 0.03 K/UL (ref 0–0.3)
ABSOLUTE LYMPH #: 1.48 K/UL (ref 1.1–3.7)
ABSOLUTE MONO #: 1.01 K/UL (ref 0.1–1.2)
ALBUMIN SERPL-MCNC: 4.3 G/DL (ref 3.5–5.2)
ALBUMIN/GLOBULIN RATIO: 1.5 (ref 1–2.5)
ALP BLD-CCNC: 57 U/L (ref 40–129)
ALT SERPL-CCNC: 13 U/L (ref 5–41)
ANION GAP SERPL CALCULATED.3IONS-SCNC: 16 MMOL/L (ref 9–17)
AST SERPL-CCNC: 18 U/L
BASOPHILS # BLD: 1 % (ref 0–2)
BASOPHILS ABSOLUTE: 0.05 K/UL (ref 0–0.2)
BILIRUB SERPL-MCNC: 0.41 MG/DL (ref 0.3–1.2)
BUN BLDV-MCNC: 31 MG/DL (ref 8–23)
BUN/CREAT BLD: ABNORMAL (ref 9–20)
CALCIUM SERPL-MCNC: 9.1 MG/DL (ref 8.6–10.4)
CHLORIDE BLD-SCNC: 109 MMOL/L (ref 98–107)
CO2: 20 MMOL/L (ref 20–31)
CREAT SERPL-MCNC: 1.39 MG/DL (ref 0.7–1.2)
DIFFERENTIAL TYPE: ABNORMAL
EOSINOPHILS RELATIVE PERCENT: 5 % (ref 1–4)
GFR AFRICAN AMERICAN: 59 ML/MIN
GFR NON-AFRICAN AMERICAN: 49 ML/MIN
GFR SERPL CREATININE-BSD FRML MDRD: ABNORMAL ML/MIN/{1.73_M2}
GFR SERPL CREATININE-BSD FRML MDRD: ABNORMAL ML/MIN/{1.73_M2}
GLUCOSE BLD-MCNC: 93 MG/DL (ref 70–99)
HCT VFR BLD CALC: 40.7 % (ref 40.7–50.3)
HEMOGLOBIN: 12.6 G/DL (ref 13–17)
IMMATURE GRANULOCYTES: 0 %
LYMPHOCYTES # BLD: 19 % (ref 24–43)
MCH RBC QN AUTO: 30 PG (ref 25.2–33.5)
MCHC RBC AUTO-ENTMCNC: 31 G/DL (ref 28.4–34.8)
MCV RBC AUTO: 96.9 FL (ref 82.6–102.9)
MONOCYTES # BLD: 13 % (ref 3–12)
NRBC AUTOMATED: 0 PER 100 WBC
PDW BLD-RTO: 13.2 % (ref 11.8–14.4)
PLATELET # BLD: 216 K/UL (ref 138–453)
PLATELET ESTIMATE: ABNORMAL
PMV BLD AUTO: 9.9 FL (ref 8.1–13.5)
POTASSIUM SERPL-SCNC: 4.9 MMOL/L (ref 3.7–5.3)
RBC # BLD: 4.2 M/UL (ref 4.21–5.77)
RBC # BLD: ABNORMAL 10*6/UL
SEDIMENTATION RATE, ERYTHROCYTE: 34 MM (ref 0–20)
SEG NEUTROPHILS: 62 % (ref 36–65)
SEGMENTED NEUTROPHILS ABSOLUTE COUNT: 4.69 K/UL (ref 1.5–8.1)
SODIUM BLD-SCNC: 145 MMOL/L (ref 135–144)
TOTAL PROTEIN: 7.1 G/DL (ref 6.4–8.3)
WBC # BLD: 7.6 K/UL (ref 3.5–11.3)
WBC # BLD: ABNORMAL 10*3/UL

## 2021-12-14 PROCEDURE — 80053 COMPREHEN METABOLIC PANEL: CPT

## 2021-12-14 PROCEDURE — 36415 COLL VENOUS BLD VENIPUNCTURE: CPT

## 2021-12-14 PROCEDURE — 85025 COMPLETE CBC W/AUTO DIFF WBC: CPT

## 2021-12-14 PROCEDURE — 85652 RBC SED RATE AUTOMATED: CPT

## 2021-12-21 RX ORDER — OMEPRAZOLE 20 MG/1
CAPSULE, DELAYED RELEASE ORAL
Qty: 90 CAPSULE | Refills: 0 | Status: SHIPPED | OUTPATIENT
Start: 2021-12-21 | End: 2022-03-24

## 2021-12-21 NOTE — TELEPHONE ENCOUNTER
Andres Fox is calling to request a refill on the following medication(s):    Medication Request:  Requested Prescriptions     Pending Prescriptions Disp Refills    omeprazole (PRILOSEC) 20 MG delayed release capsule [Pharmacy Med Name: OMEPRAZOLE DR 20 MG CAPSULE] 90 capsule 0     Sig: take 1 capsule by mouth once daily       Last Visit Date (If Applicable):  28/81/9091    Next Visit Date:    4/26/2022

## 2022-03-24 RX ORDER — OMEPRAZOLE 20 MG/1
CAPSULE, DELAYED RELEASE ORAL
Qty: 90 CAPSULE | Refills: 0 | Status: SHIPPED | OUTPATIENT
Start: 2022-03-24 | End: 2022-06-27

## 2022-03-24 NOTE — TELEPHONE ENCOUNTER
Alex Epperson is calling to request a refill on the following medication(s):    Medication Request:  Last fill 12/21/21 #90    Requested Prescriptions     Pending Prescriptions Disp Refills    omeprazole (PRILOSEC) 20 MG delayed release capsule [Pharmacy Med Name: OMEPRAZOLE DR 20 MG CAPSULE] 90 capsule 0     Sig: take 1 capsule by mouth once daily       Last Visit Date (If Applicable):  67/33/0518    Next Visit Date:    4/26/2022

## 2022-06-27 RX ORDER — OMEPRAZOLE 20 MG/1
CAPSULE, DELAYED RELEASE ORAL
Qty: 30 CAPSULE | Refills: 0 | Status: SHIPPED | OUTPATIENT
Start: 2022-06-27 | End: 2022-07-28

## 2022-06-27 NOTE — TELEPHONE ENCOUNTER
Sophy Gastelum is calling to request a refill on the following medication(s):    Medication Request:  Requested Prescriptions     Pending Prescriptions Disp Refills    omeprazole (PRILOSEC) 20 MG delayed release capsule [Pharmacy Med Name: OMEPRAZOLE DR 20 MG CAPSULE] 90 capsule 0     Sig: take 1 capsule by mouth once daily     Last filled 3/24/22 90 day no refill  Order pending, message for sameera    Last Visit Date (If Applicable):  64/21/3259    Next Visit Date:    Visit date not found

## 2022-06-30 ENCOUNTER — APPOINTMENT (OUTPATIENT)
Dept: GENERAL RADIOLOGY | Age: 87
End: 2022-06-30
Payer: MEDICARE

## 2022-06-30 ENCOUNTER — APPOINTMENT (OUTPATIENT)
Dept: CT IMAGING | Age: 87
End: 2022-06-30
Payer: MEDICARE

## 2022-06-30 ENCOUNTER — HOSPITAL ENCOUNTER (EMERGENCY)
Age: 87
Discharge: HOME | End: 2022-06-30
Attending: EMERGENCY MEDICINE
Payer: MEDICARE

## 2022-06-30 VITALS
DIASTOLIC BLOOD PRESSURE: 77 MMHG | HEART RATE: 60 BPM | HEIGHT: 66 IN | RESPIRATION RATE: 16 BRPM | BODY MASS INDEX: 36.96 KG/M2 | TEMPERATURE: 98.1 F | SYSTOLIC BLOOD PRESSURE: 149 MMHG | WEIGHT: 230 LBS | OXYGEN SATURATION: 96 %

## 2022-06-30 DIAGNOSIS — S61.216A LACERATION OF RIGHT LITTLE FINGER WITHOUT FOREIGN BODY WITHOUT DAMAGE TO NAIL, INITIAL ENCOUNTER: ICD-10-CM

## 2022-06-30 DIAGNOSIS — S70.01XA CONTUSION OF RIGHT HIP, INITIAL ENCOUNTER: ICD-10-CM

## 2022-06-30 DIAGNOSIS — S50.311A ABRASION OF RIGHT ELBOW, INITIAL ENCOUNTER: ICD-10-CM
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
## 2022-06-30 PROCEDURE — 99284 EMERGENCY DEPT VISIT MOD MDM: CPT

## 2022-06-30 PROCEDURE — 73610 X-RAY EXAM OF ANKLE: CPT

## 2022-06-30 PROCEDURE — 73630 X-RAY EXAM OF FOOT: CPT

## 2022-06-30 PROCEDURE — 71045 X-RAY EXAM CHEST 1 VIEW: CPT

## 2022-06-30 PROCEDURE — 73080 X-RAY EXAM OF ELBOW: CPT

## 2022-06-30 PROCEDURE — 72125 CT NECK SPINE W/O DYE: CPT

## 2022-06-30 PROCEDURE — 73130 X-RAY EXAM OF HAND: CPT

## 2022-06-30 PROCEDURE — 73110 X-RAY EXAM OF WRIST: CPT

## 2022-06-30 PROCEDURE — 12001 RPR S/N/AX/GEN/TRNK 2.5CM/<: CPT

## 2022-06-30 PROCEDURE — 70450 CT HEAD/BRAIN W/O DYE: CPT

## 2022-06-30 PROCEDURE — 73502 X-RAY EXAM HIP UNI 2-3 VIEWS: CPT

## 2022-06-30 RX ORDER — CEPHALEXIN 250 MG/1
250 CAPSULE ORAL 4 TIMES DAILY
Qty: 28 CAPSULE | Refills: 0 | Status: SHIPPED | OUTPATIENT
Start: 2022-06-30

## 2022-06-30 NOTE — ED NOTES
Patient education flyer \"[facility] What [facility] need to know\" was provided and reviewed. Questions answered and understanding was verbalized by the patient and/or family.         Cole Yu RN  06/30/22 1755

## 2022-06-30 NOTE — ED PROVIDER NOTES
EMERGENCY DEPARTMENT ENCOUNTER    Pt Name: Briana Barton  MRN: [id number]  [place] 1935  Date of evaluation: 6/30/22  CHIEF COMPLAINT       Chief Complaint   Patient presents with    Fall     Tripped over table this mor    Hand Injury     R 5th digit laceration    Head Injury     Hit head    Arm Injury     R elbow abrasion      HISTORY OF PRESENT ILLNESS   Patient is an 81-year-old male who is brought in by EMS after trip and fall this morning at his home. He was walking to garage, stepped down 1 step and tripped over \"junk \"in his garage. Positive head strike, no LOC. Primarily on right side. Has obvious injury to right elbow and right hand. Also complains of pain to right hip and right foot. Tetanus up-to-date per EMR. No other issues at this time. ROS:  No fevers, cough, shortness of breath, chest pain, abdominal pain, nausea, vomiting, changes in urine or stool. REVIEW OF SYSTEMS     Review of Systems   All other systems reviewed and are negative.     PASTMEDICAL HISTORY     Past Medical History:   Diagnosis Date    Acute renal failure (ARF) ([facility] [address] 1/16/2016    Bleeding in [facility])     after fall    Coronary artery disease involving native coronary artery of native heart without angina pectoris 1/28/2016    Gastritis     Heart attack ([facility] [address]     History of blood transfusion     Hyperlipidemia     Hypertension     Lumbar spinal stenosis 11/8/2017    Osteoarthritis     Rectal bleeding      SURGICAL HISTORY       Past Surgical History:   Procedure Laterality Date    APPENDECTOMY      BACK SURGERY  40 YEARS AGO    CORONARY ANGIOPLASTY WITH STENT PLACEMENT      HAND SURGERY Left     HERNIA REPAIR      OTHER SURGICAL HISTORY      scalp surgery    UPPER GASTROINTESTINAL ENDOSCOPY  01/18/16    UPPER GASTROINTESTINAL ENDOSCOPY  1/18/2016    large and deep ulcer duodenal bulb, minimal bleeding    UPPER GASTROINTESTINAL ENDOSCOPY  3/16/16    ,ild antritis, signs of healed ulcer scar noted     CURRENT MEDICATIONS       Previous Medications    ACETAMINOPHEN (TYLENOL ARTHRITIS PAIN PO)    Take 500 mg by mouth 2 times daily    ASPIRIN 81 MG TABLET    Take 81 mg by mouth daily    ATORVASTATIN (LIPITOR) 20 MG TABLET        CLOPIDOGREL (PLAVIX) 75 MG TABLET    Take 75 mg by mouth daily    ISOSORBIDE MONONITRATE (IMDUR) 30 MG EXTENDED RELEASE TABLET    Take 1 tablet by mouth daily    METOPROLOL SUCCINATE (TOPROL XL) 50 MG EXTENDED RELEASE TABLET    Take 1 tablet by mouth daily Take 50 mg by mouth daily    OMEPRAZOLE (PRILOSEC) 20 MG DELAYED RELEASE CAPSULE    take 1 capsule by mouth once daily    SPIRONOLACTONE (ALDACTONE) 25 MG TABLET    Take 1 tablet by mouth daily     ALLERGIES     is allergic to pcn [penicillins] and pineapple. FAMILY HISTORY     has no family status information on file. SOCIAL HISTORY       Social History     Tobacco Use    Smoking status: Never Smoker    Smokeless tobacco: Never Used   Substance Use Topics    Alcohol use: No    Drug use: No     PHYSICAL EXAM     INITIAL VITALS: BP (!) 149/77   Pulse 60   Temp 98.1 °F (36.7 °C) (Oral)   Resp 16   Ht 5' 6\" (1.676 m)   Wt 230 lb (104.3 kg)   SpO2 96%   BMI 37.12 kg/m²    Physical Exam  HENT:      Head: Normocephalic. Right Ear: External ear normal.      Left Ear: External ear normal.      Nose: Nose normal.   Eyes:      Extraocular Movements: Extraocular movements intact. Conjunctiva/sclera: Conjunctivae normal.      Pupils: Pupils are equal, round, and reactive to light. Cardiovascular:      Rate and Rhythm: Normal rate. Pulmonary:      Effort: Pulmonary effort is normal.   Abdominal:      General: Abdomen is flat. Musculoskeletal:      Comments: Tenderness right hip, elbow, wrist and hand. Skin:     General: Skin is dry. Comments: Abrasion right elbow. Laceration right fifth digit. Neurological:      General: No focal deficit present.       Mental Status: He is alert and oriented to person, place, and time. Mental status is at baseline. Sensory: Sensory deficit present. Motor: No weakness. Psychiatric:         Mood and Affect: Mood normal.         Behavior: Behavior normal.           MEDICAL DECISION MAKING:   The patient is hemodynamically stable, afebrile, nontoxic-appearing. Physical exam notable for tenderness right hip, right elbow, right wrist, right hand with abrasion to right elbow and laceration to fifth digit right hand. Based on history and exam will require trauma work-up, and wound repair. ED plan for CT head, C-spine. X-ray chest, hip/pelvis, elbow, wrist, hand. Lac Repair    Date/Time: 6/30/2022 10:31 AM  Performed by: Annabelle Pruett MD  Authorized by: Annabelle Pruett MD     Consent:     Consent obtained:  Verbal    Consent given by:  Patient    Risks discussed:  Infection, pain and nerve damage    Alternatives discussed:  Delayed treatment  Anesthesia (see MAR for exact dosages): Anesthesia method:  Local infiltration    Local anesthetic:  Lidocaine 1% w/o epi  Laceration details:     Location:  Finger    Finger location:  R small finger    Length (cm):  2.5  Repair type:     Repair type:  Simple  Pre-procedure details:     Preparation:  Patient was prepped and draped in usual sterile fashion  Exploration:     Hemostasis achieved with:  Direct pressure    Wound exploration: wound explored through full range of motion      Contaminated: no    Treatment:     Area cleansed with:  Betadine    Amount of cleaning:  Standard    Irrigation solution:  Sterile water    Irrigation method:  Pressure wash    Visualized foreign bodies/material removed: no    Skin repair:     Repair method:  Sutures    Suture size:  5-0    Suture material:  Prolene    Number of sutures:  11  Approximation:     Approximation:  Close  Post-procedure details:     Dressing:  Sterile dressing and adhesive bandage    Patient tolerance of procedure:   Tolerated well, no immediate complications          DIAGNOSTIC RESULTS   EKG:All EKG's are interpreted by the Emergency Department Physician who either signs or Co-signs this chart in the absence of a cardiologist.        RADIOLOGY:All plain film, CT, MRI, and formal ultrasound images (except ED bedside ultrasound) are read by the radiologist, see reports below, unless otherwisenoted in MDM or here. CT Cervical Spine WO Contrast   Final Result   CT HEAD:      No CT evidence for acute intracranial abnormality. .      Cerebral atrophy. CT CERVICAL SPINE:      No acute fracture or subluxation of the cervical spine. Degenerative changes lower cervical spine. CT Head WO Contrast   Final Result   CT HEAD:      No CT evidence for acute intracranial abnormality. .      Cerebral atrophy. CT CERVICAL SPINE:      No acute fracture or subluxation of the cervical spine. Degenerative changes lower cervical spine. XR FOOT RIGHT (MIN 3 VIEWS)   Preliminary Result   Right wrist: Arthritic changes, osteopenia, TFC cartilage calcification,   widened navicular lunate space consistent with ligamentous disruption without   acute fracture or dislocation. Chest: Cardiomegaly. Basilar stranding likely related to atelectasis or   chronic change. No overt edema or acute cardiopulmonary process. Right elbow: Degenerative changes. No acute osseous abnormality. Right hand: Osteopenia and arthritic changes with calcifications as above. No acute fracture or dislocation. Widened navicular lunate space suggesting   ligamentous dissociation. Right ankle: No acute osseous abnormality. Right foot: Osteopenia, arthritic change and nondisplaced 5th metatarsal   fracture with soft tissue swelling.          XR ANKLE RIGHT (MIN 3 VIEWS)   Preliminary Result   Right wrist: Arthritic changes, osteopenia, TFC cartilage calcification,   widened navicular lunate space consistent with ligamentous disruption without   acute fracture or dislocation. Chest: Cardiomegaly. Basilar stranding likely related to atelectasis or   chronic change. No overt edema or acute cardiopulmonary process. Right elbow: Degenerative changes. No acute osseous abnormality. Right hand: Osteopenia and arthritic changes with calcifications as above. No acute fracture or dislocation. Widened navicular lunate space suggesting   ligamentous dissociation. Right ankle: No acute osseous abnormality. Right foot: Osteopenia, arthritic change and nondisplaced 5th metatarsal   fracture with soft tissue swelling. XR HAND RIGHT (MIN 3 VIEWS)   Preliminary Result   Right wrist: Arthritic changes, osteopenia, TFC cartilage calcification,   widened navicular lunate space consistent with ligamentous disruption without   acute fracture or dislocation. Chest: Cardiomegaly. Basilar stranding likely related to atelectasis or   chronic change. No overt edema or acute cardiopulmonary process. Right elbow: Degenerative changes. No acute osseous abnormality. Right hand: Osteopenia and arthritic changes with calcifications as above. No acute fracture or dislocation. Widened navicular lunate space suggesting   ligamentous dissociation. Right ankle: No acute osseous abnormality. Right foot: Osteopenia, arthritic change and nondisplaced 5th metatarsal   fracture with soft tissue swelling. XR WRIST RIGHT (MIN 3 VIEWS)   Preliminary Result   Right wrist: Arthritic changes, osteopenia, TFC cartilage calcification,   widened navicular lunate space consistent with ligamentous disruption without   acute fracture or dislocation. Chest: Cardiomegaly. Basilar stranding likely related to atelectasis or   chronic change. No overt edema or acute cardiopulmonary process. Right elbow: Degenerative changes. No acute osseous abnormality. Right hand: Osteopenia and arthritic changes with calcifications as above. No acute fracture or dislocation. Widened navicular lunate space suggesting   ligamentous dissociation. Right ankle: No acute osseous abnormality. Right foot: Osteopenia, arthritic change and nondisplaced 5th metatarsal   fracture with soft tissue swelling. XR ELBOW RIGHT (MIN 3 VIEWS)   Preliminary Result   Right wrist: Arthritic changes, osteopenia, TFC cartilage calcification,   widened navicular lunate space consistent with ligamentous disruption without   acute fracture or dislocation. Chest: Cardiomegaly. Basilar stranding likely related to atelectasis or   chronic change. No overt edema or acute cardiopulmonary process. Right elbow: Degenerative changes. No acute osseous abnormality. Right hand: Osteopenia and arthritic changes with calcifications as above. No acute fracture or dislocation. Widened navicular lunate space suggesting   ligamentous dissociation. Right ankle: No acute osseous abnormality. Right foot: Osteopenia, arthritic change and nondisplaced 5th metatarsal   fracture with soft tissue swelling. XR HIP 2-3 VW W PELVIS RIGHT   Final Result   Bilateral hip arthroplasties and osteopenia are noted without evidence of   acute fracture or hardware complication. RECOMMENDATION:   Follow-up studies as clinically indicated. XR CHEST PORTABLE   Preliminary Result   Right wrist: Arthritic changes, osteopenia, TFC cartilage calcification,   widened navicular lunate space consistent with ligamentous disruption without   acute fracture or dislocation. Chest: Cardiomegaly. Basilar stranding likely related to atelectasis or   chronic change. No overt edema or acute cardiopulmonary process. Right elbow: Degenerative changes. No acute osseous abnormality. Right hand: Osteopenia and arthritic changes with calcifications as above. No acute fracture or dislocation.   Widened navicular lunate space suggesting   ligamentous dissociation. Right ankle: No acute osseous abnormality. Right foot: Osteopenia, arthritic change and nondisplaced 5th metatarsal   fracture with soft tissue swelling. LABS: All lab results were reviewed by myself, and all abnormals are listed below. Labs Reviewed - No data to display    EMERGENCY DEPARTMENTCOURSE:   Patient did well in the ED. CT head negative for intracranial abnormality. X-rays negative for acute fracture dislocation. Laceration base of fifth digit repaired per note above. Started on Keflex. Advised to return in 7 to 10 days to have sutures removed. No further work-up indicated at this time. Nursing notes reviewed. At this time this is what I find, the patient appears well and does not appear sick or toxic. I gave my usual and customary discussion of the risks and benefits of discharge versus admission. I answered the patient's questions. I gave the patient strict return precautions. Patient expressed understanding of the discharge instructions. The care is provided during an unprecedented national emergency due to the novel coronavirus, COVID-19. Dictated but not reviewed. Vitals:    Vitals:    06/30/22 0807   BP: (!) 149/77   Pulse: 60   Resp: 16   Temp: 98.1 °F (36.7 °C)   TempSrc: Oral   SpO2: 96%   Weight: 230 lb (104.3 kg)   Height: 5' 6\" (1.676 m)       The patient was given the following medications while in the emergency department:  Orders Placed This Encounter   Medications    cephALEXin (KEFLEX) 250 MG capsule     Sig: Take 1 capsule by mouth 4 times daily     Dispense:  28 capsule     Refill:  0     CONSULTS:  None    FINAL IMPRESSION      1. Fall, initial encounter    2. Abrasion of right elbow, initial encounter    3. Laceration of right little finger without foreign body without damage to nail, initial encounter    4.  Contusion of right hip, initial encounter          DISPOSITION/PLAN   DISPOSITION        PATIENT REFERRED TO:  Sepideh Moi Clemente MD  [address] [facility]   [phone]    Call       DISCHARGE MEDICATIONS:  New Prescriptions    CEPHALEXIN (KEFLEX) 250 MG CAPSULE    Take 1 capsule by mouth 4 times daily     Israel Reina MD  Attending Emergency Physician                    Maria Teresa Hand MD  06/30/22 8683

## 2022-07-05 ENCOUNTER — TELEPHONE (OUTPATIENT)
Dept: INTERNAL MEDICINE CLINIC | Age: 87
End: 2022-07-05

## 2022-07-05 NOTE — TELEPHONE ENCOUNTER
----- Message from Brittany Mesa sent at 7/5/2022 12:56 PM EDT -----  Subject: Message to Provider    QUESTIONS  Information for Provider? patient needs to set up an appointment to have   stiches removed from hand. They was put in June 30th. please call them  ---------------------------------------------------------------------------  --------------  [id number]  [phone]; Do not leave any message, patient will call back for answer  ---------------------------------------------------------------------------  --------------  SCRIPT ANSWERS  Relationship to Patient? Other  Representative Name? wife  Additional information verified (besides Name and Date of Birth)? Address  (Is the patient requesting to see the provider for a procedure?)?  Yes

## 2022-07-08 ENCOUNTER — HOSPITAL ENCOUNTER (OUTPATIENT)
Age: 87
Discharge: HOME | End: 2022-07-10
Payer: MEDICARE

## 2022-07-08 ENCOUNTER — HOSPITAL ENCOUNTER (OUTPATIENT)
Dept: GENERAL RADIOLOGY | Age: 87
Discharge: HOME | End: 2022-07-10
Payer: MEDICARE

## 2022-07-08 ENCOUNTER — OFFICE VISIT (OUTPATIENT)
Dept: INTERNAL MEDICINE CLINIC | Age: 87
End: 2022-07-08
Payer: MEDICARE

## 2022-07-08 VITALS
SYSTOLIC BLOOD PRESSURE: 148 MMHG | BODY MASS INDEX: 35.68 KG/M2 | DIASTOLIC BLOOD PRESSURE: 70 MMHG | TEMPERATURE: 97.2 F | RESPIRATION RATE: 16 BRPM | OXYGEN SATURATION: 97 % | HEIGHT: 66 IN | WEIGHT: 222 LBS | HEART RATE: 61 BPM

## 2022-07-08 DIAGNOSIS — F39 MOOD DISORDER (HCC): ICD-10-CM

## 2022-07-08 DIAGNOSIS — M50.20 CERVICAL DISCOGENIC PAIN SYNDROME: ICD-10-CM

## 2022-07-08 DIAGNOSIS — I10 HYPERTENSION, UNSPECIFIED TYPE: ICD-10-CM

## 2022-07-08 DIAGNOSIS — I25.10 CORONARY ARTERY DISEASE INVOLVING NATIVE CORONARY ARTERY OF NATIVE HEART WITHOUT ANGINA PECTORIS: ICD-10-CM

## 2022-07-08 DIAGNOSIS — S65.311A LACERATION OF DEEP PALMAR ARCH OF RIGHT HAND, INITIAL ENCOUNTER: ICD-10-CM

## 2022-07-08 DIAGNOSIS — M51.26 LUMBAR DISCOGENIC PAIN SYNDROME: ICD-10-CM

## 2022-07-08 DIAGNOSIS — E66.01 SEVERE OBESITY (BMI 35.0-39.9) WITH COMORBIDITY (HCC): ICD-10-CM

## 2022-07-08 DIAGNOSIS — M48.062 SPINAL STENOSIS OF LUMBAR REGION WITH NEUROGENIC CLAUDICATION: Chronic | ICD-10-CM

## 2022-07-08 DIAGNOSIS — D52.9 ANEMIA DUE TO FOLIC ACID DEFICIENCY, UNSPECIFIED DEFICIENCY TYPE: ICD-10-CM

## 2022-07-08 DIAGNOSIS — S65.311A LACERATION OF DEEP PALMAR ARCH OF RIGHT HAND, INITIAL ENCOUNTER: Primary | ICD-10-CM

## 2022-07-08 DIAGNOSIS — S69.91XA HAND TRAUMA, RIGHT, INITIAL ENCOUNTER: ICD-10-CM

## 2022-07-08 DIAGNOSIS — Z98.890 HISTORY OF CRANIOTOMY: ICD-10-CM

## 2022-07-08 DIAGNOSIS — E78.2 MIXED HYPERLIPIDEMIA: ICD-10-CM

## 2022-07-08 DIAGNOSIS — N18.31 CHRONIC KIDNEY DISEASE (CKD) STAGE G3A/A1, MODERATELY DECREASED GLOMERULAR FILTRATION RATE (GFR) BETWEEN 45-59 ML/MIN/1.73 SQUARE METER AND ALBUMINURIA CREATININE RATIO LESS THAN 30 MG/G (HCC): ICD-10-CM

## 2022-07-08 DIAGNOSIS — Z86.79 H/O INTRACRANIAL HEMORRHAGE: ICD-10-CM

## 2022-07-08 DIAGNOSIS — T83.410S FAILURE OF PENILE IMPLANT, SEQUELA: ICD-10-CM

## 2022-07-08 PROCEDURE — G8427 DOCREV CUR MEDS BY ELIG CLIN: HCPCS | Performed by: FAMILY MEDICINE

## 2022-07-08 PROCEDURE — 73100 X-RAY EXAM OF WRIST: CPT

## 2022-07-08 PROCEDURE — 1123F ACP DISCUSS/DSCN MKR DOCD: CPT | Performed by: FAMILY MEDICINE

## 2022-07-08 PROCEDURE — 1036F TOBACCO NON-USER: CPT | Performed by: FAMILY MEDICINE

## 2022-07-08 PROCEDURE — G8417 CALC BMI ABV UP PARAM F/U: HCPCS | Performed by: FAMILY MEDICINE

## 2022-07-08 PROCEDURE — 99214 OFFICE O/P EST MOD 30 MIN: CPT | Performed by: FAMILY MEDICINE

## 2022-07-08 PROCEDURE — 73120 X-RAY EXAM OF HAND: CPT

## 2022-07-08 SDOH — ECONOMIC STABILITY: FOOD INSECURITY: WITHIN THE PAST 12 MONTHS, THE FOOD YOU BOUGHT JUST DIDN'T LAST AND YOU DIDN'T HAVE MONEY TO GET MORE.: NEVER TRUE

## 2022-07-08 SDOH — ECONOMIC STABILITY: FOOD INSECURITY: WITHIN THE PAST 12 MONTHS, YOU WORRIED THAT YOUR FOOD WOULD RUN OUT BEFORE YOU GOT MONEY TO BUY MORE.: NEVER TRUE

## 2022-07-08 ASSESSMENT — PATIENT HEALTH QUESTIONNAIRE - PHQ9
SUM OF ALL RESPONSES TO PHQ QUESTIONS 1-9: 0
SUM OF ALL RESPONSES TO PHQ QUESTIONS 1-9: 0
SUM OF ALL RESPONSES TO PHQ9 QUESTIONS 1 & 2: 0
SUM OF ALL RESPONSES TO PHQ QUESTIONS 1-9: 0
2. FEELING DOWN, DEPRESSED OR HOPELESS: 0
1. LITTLE INTEREST OR PLEASURE IN DOING THINGS: 0
SUM OF ALL RESPONSES TO PHQ QUESTIONS 1-9: 0

## 2022-07-08 ASSESSMENT — ENCOUNTER SYMPTOMS
ALLERGIC/IMMUNOLOGIC NEGATIVE: 1
RESPIRATORY NEGATIVE: 1
BACK PAIN: 1
EYES NEGATIVE: 1
GASTROINTESTINAL NEGATIVE: 1

## 2022-07-08 ASSESSMENT — SOCIAL DETERMINANTS OF HEALTH (SDOH): HOW HARD IS IT FOR YOU TO PAY FOR THE VERY BASICS LIKE FOOD, HOUSING, MEDICAL CARE, AND HEATING?: NOT HARD AT ALL

## 2022-07-08 NOTE — PROGRESS NOTES
Subjective:      Patient ID: Toni Linares is a 82 y.o. male. Fall  The accident occurred 5 to 7 days ago. Fall occurred: Going down the steps. He fell from a height of 3 to 5 ft. He landed on concrete. There was no blood loss. Point of impact: Right upper extremity, face. Pain location: Right hand, right wrist, right elbow. The pain is at a severity of 4/10. The pain is moderate. The symptoms are aggravated by pressure on injury. He has tried ice and rest for the symptoms. The treatment provided moderate relief. Review of Systems   Constitutional: Negative. HENT: Negative. Eyes: Negative. Respiratory: Negative. Cardiovascular: Negative. Gastrointestinal: Negative. Endocrine: Negative. Musculoskeletal: Positive for arthralgias, back pain and gait problem. Skin: Negative. Allergic/Immunologic: Negative. Hematological: Negative. Psychiatric/Behavioral: The patient is nervous/anxious. Past family and social history unremarkable. Diagnosis Orders   1. Laceration of deep palmar arch of right hand, initial encounter  XR WRIST RIGHT (2 VIEWS)    XR HAND RIGHT (2 VIEWS)   2. Severe obesity (BMI 35.0-39. 9) with comorbidity ([facility] [address]     3. Mood disorder ([facility] [address]     4. Chronic kidney disease (CKD) stage G3a/A1, moderately decreased glomerular filtration rate (GFR) between 45-59 mL/min/1.73 square meter and albuminuria creatinine ratio less than 30 mg/g (HCC)     5. Spinal stenosis of lumbar region with neurogenic claudication     6. Mixed hyperlipidemia     7. Hypertension, unspecified type     8. Failure of penile implant, sequela     9. 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 Coronary artery disease involving native coronary artery of native heart without angina pectoris     11. History of craniotomy     12. H/O intracranial hemorrhage     13. Cervical discogenic pain syndrome     14. Lumbar discogenic pain syndrome     15.  Hand trauma, right, initial encounter  XR HAND RIGHT (2 VIEWS)         Objective:   Physical Exam  Vitals and nursing note reviewed. Constitutional:       Appearance: He is well-developed. Comments: Obesity with suspected sleep apnea   HENT:      Head: Normocephalic and atraumatic. Right Ear: External ear normal.      Left Ear: External ear normal.      Nose: Nose normal.   Eyes:      Conjunctiva/sclera: Conjunctivae normal.      Pupils: Pupils are equal, round, and reactive to light. Cardiovascular:      Rate and Rhythm: Normal rate and regular rhythm. Heart sounds: Normal heart sounds. Comments: Coronary artery disease  Hypertension  Hyperlipidemia  Pulmonary:      Effort: Pulmonary effort is normal.      Breath sounds: Normal breath sounds. Abdominal:      General: Bowel sounds are normal.      Palpations: Abdomen is soft. Comments: GERD   Musculoskeletal:         General: Normal range of motion. Cervical back: Normal range of motion and neck supple. Comments: Posttraumatic bruises and abrasions right upper extremity  2.5 to 3 cm horizontal incision over the palmar aspect of PIP joint left little finger. Neurologically intact. Patient is able to move digit full range of motion. Sensorimotor intact. Small gaps in 2 of the stitches  Degenerative polyarthralgia  Spondylosis with kyphosis. Skin:     General: Skin is warm and dry. Neurological:      Mental Status: He is alert and oriented to person, place, and time. Deep Tendon Reflexes: Reflexes are normal and symmetric. Comments: Remote past history of fall with intracranial hemorrhage treated with craniotomy. Neurologically intact   Psychiatric:         Behavior: Behavior normal.         Thought Content: Thought content normal.         Assessment:       Diagnosis Orders   1. Laceration of deep palmar arch of right hand, initial encounter  XR WRIST RIGHT (2 VIEWS)    XR HAND RIGHT (2 VIEWS)   2. Severe obesity (BMI 35.0-39. 9) with comorbidity ([facility] [address]     3. Mood disorder ([facility] [address]     4. Chronic kidney disease (CKD) stage G3a/A1, moderately decreased glomerular filtration rate (GFR) between 45-59 mL/min/1.73 square meter and albuminuria creatinine ratio less than 30 mg/g (HCC)     5. Spinal stenosis of lumbar region with neurogenic claudication     6. Mixed hyperlipidemia     7. Hypertension, unspecified type     8. Failure of penile implant, sequela     9. 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 Coronary artery disease involving native coronary artery of native heart without angina pectoris     11. History of craniotomy     12. H/O intracranial hemorrhage     13. Cervical discogenic pain syndrome     14. Lumbar discogenic pain syndrome     15. Hand trauma, right, initial encounter  XR HAND RIGHT (2 VIEWS)           Plan: This is ER follow-up. 29-year-old  male was trying to go down the steps in his garage when he lost his balance sustained a fall. He was seen in the emergency room with bruising and abrasion of the right upper extremity, laceration over the volar aspect of PIP joint left little finger that was sutured. Sensorimotor intact. There is still a few gaps into of the stitching. He is advised to return in 1 week for removal of sutures. Advised him to keep it clean and dry and Band-Aid. Postop edema of the dorsum right hand that he says is improving. Continue ice pack, Ace wrap, Keflex. He is updated on Tdap  Radiological work-up reviewed, no apparent fracture was seen. However he did not get x-rays of his hand and wrist where he is complaining of pain. History of prior ORIF right distal wrist.  No point tenderness. History of coronary artery disease. No recent decompensation. He is on aspirin and Plavix. He is established with cardiology  Hypertension well-controlled to Aldactone, metoprolol.   Consume less than 2 g of salt a day  Hyperlipidemia on statin that he is tolerating well  GERD stable on proton pump inhibitor  I have ordered x-rays of the right hand and right wrist.  Pain is well controlled. Fall precaution is advised  Safety counseling including but not limited to carbon monoxide/Vir[place], ambulating, avoiding loose clutter and staying in familiar environment  CKD . Creatinine is 1.39. Avoid nephrotoxic drugs, anti-inflammatory radiocontrast.  Maintain oral hydration  History of failed penile implant. Further recommendations to follow  Return in 1 week for removal of stitches  This note is created with a voice recognition program and while intend to generate a document that accurately reflects the content of the visit, no guarantee can be provided that every mistake has been identified and corrected by editing.       Edd Patel MD

## 2022-07-15 ENCOUNTER — NURSE ONLY (OUTPATIENT)
Dept: INTERNAL MEDICINE CLINIC | Age: 87
End: 2022-07-15

## 2022-07-28 RX ORDER — OMEPRAZOLE 20 MG/1
CAPSULE, DELAYED RELEASE ORAL
Qty: 90 CAPSULE | Refills: 1 | Status: SHIPPED | OUTPATIENT
Start: 2022-07-28

## 2022-07-28 NOTE — TELEPHONE ENCOUNTER
Yarelis Hlil is calling to request a refill on the following medication(s):    Medication Request:  Requested Prescriptions     Pending Prescriptions Disp Refills    omeprazole (PRILOSEC) 20 MG delayed release capsule [Pharmacy Med Name: OMEPRAZOLE DR 20 MG CAPSULE] 30 capsule 0     Sig: take 1 capsule by mouth once daily       Last Visit Date (If Applicable):  3/3/7546    Next Visit Date:    Visit date not found

## 2022-08-02 ENCOUNTER — TELEPHONE (OUTPATIENT)
Dept: INTERNAL MEDICINE CLINIC | Age: 87
End: 2022-08-02

## 2023-01-27 RX ORDER — OMEPRAZOLE 20 MG/1
CAPSULE, DELAYED RELEASE ORAL
Qty: 90 CAPSULE | Refills: 1 | OUTPATIENT
Start: 2023-01-27

## 2023-01-27 NOTE — TELEPHONE ENCOUNTER
Inna Arvizu is calling to request a refill on the following medication(s):    Medication Request:  Requested Prescriptions     Pending Prescriptions Disp Refills    omeprazole (PRILOSEC) 20 MG delayed release capsule [Pharmacy Med Name: OMEPRAZOLE DR 20 MG CAPSULE] 90 capsule 1     Sig: take 1 capsule by mouth once daily       Last Visit Date (If Applicable):  8/7/4915    Next Visit Date:    Visit date not found

## 2023-08-17 ENCOUNTER — HOSPITAL ENCOUNTER (OUTPATIENT)
Age: 88
Setting detail: SPECIMEN
Discharge: HOME | End: 2023-08-17
Payer: MEDICARE

## 2023-08-17 LAB
ALBUMIN SERPL-MCNC: 4.3 G/DL (ref 3.5–5.2)
ALBUMIN/GLOB SERPL: 1.6 {RATIO} (ref 1–2.5)
ALP SERPL-CCNC: 64 U/L (ref 40–129)
ALT SERPL-CCNC: 9 U/L (ref 5–41)
ANION GAP SERPL CALCULATED.3IONS-SCNC: 17 MMOL/L (ref 9–17)
AST SERPL-CCNC: 20 U/L
BASOPHILS # BLD: 0.06 K/UL (ref 0–0.2)
BASOPHILS NFR BLD: 1 % (ref 0–2)
BILIRUB SERPL-MCNC: 0.5 MG/DL (ref 0.3–1.2)
BUN SERPL-MCNC: 28 MG/DL (ref 8–23)
CALCIUM SERPL-MCNC: 9.4 MG/DL (ref 8.6–10.4)
CHLORIDE SERPL-SCNC: 107 MMOL/L (ref 98–107)
CHOLEST SERPL-MCNC: 136 MG/DL
CHOLESTEROL/HDL RATIO: 3.9
CK SERPL-CCNC: 102 U/L (ref 39–308)
CO2 SERPL-SCNC: 20 MMOL/L (ref 20–31)
CREAT SERPL-MCNC: 1.9 MG/DL (ref 0.7–1.2)
EOSINOPHIL # BLD: 0.18 K/UL (ref 0–0.44)
EOSINOPHILS RELATIVE PERCENT: 2 % (ref 1–4)
ERYTHROCYTE [DISTWIDTH] IN BLOOD BY AUTOMATED COUNT: 13.7 % (ref 11.8–14.4)
GFR SERPL CREATININE-BSD FRML MDRD: 34 ML/MIN/1.73M2
GLUCOSE SERPL-MCNC: 97 MG/DL (ref 70–99)
HCT VFR BLD AUTO: 39.7 % (ref 40.7–50.3)
HDLC SERPL-MCNC: 35 MG/DL
HGB BLD-MCNC: 12.5 G/DL (ref 13–17)
IMM GRANULOCYTES # BLD AUTO: 0.04 K/UL (ref 0–0.3)
IMM GRANULOCYTES NFR BLD: 1 %
LDLC SERPL CALC-MCNC: 71 MG/DL (ref 0–130)
LYMPHOCYTES NFR BLD: 1.77 K/UL (ref 1.1–3.7)
LYMPHOCYTES RELATIVE PERCENT: 22 % (ref 24–43)
MCH RBC QN AUTO: 29.3 PG (ref 25.2–33.5)
MCHC RBC AUTO-ENTMCNC: 31.5 G/DL (ref 28.4–34.8)
MCV RBC AUTO: 93.2 FL (ref 82.6–102.9)
MONOCYTES NFR BLD: 1.07 K/UL (ref 0.1–1.2)
MONOCYTES NFR BLD: 13 % (ref 3–12)
NEUTROPHILS NFR BLD: 61 % (ref 36–65)
NEUTS SEG NFR BLD: 4.84 K/UL (ref 1.5–8.1)
NRBC BLD-RTO: 0 PER 100 WBC
PLATELET # BLD AUTO: 225 K/UL (ref 138–453)
PMV BLD AUTO: 8.9 FL (ref 8.1–13.5)
POTASSIUM SERPL-SCNC: 5.2 MMOL/L (ref 3.7–5.3)
PROT SERPL-MCNC: 7 G/DL (ref 6.4–8.3)
RBC # BLD AUTO: 4.26 M/UL (ref 4.21–5.77)
SODIUM SERPL-SCNC: 144 MMOL/L (ref 135–144)
TRIGL SERPL-MCNC: 149 MG/DL
WBC OTHER # BLD: 8 K/UL (ref 3.5–11.3)

## 2023-08-17 PROCEDURE — 80053 COMPREHEN METABOLIC PANEL: CPT

## 2023-08-17 PROCEDURE — 85025 COMPLETE CBC W/AUTO DIFF WBC: CPT

## 2023-08-17 PROCEDURE — 82550 ASSAY OF CK (CPK): CPT

## 2023-08-17 PROCEDURE — 36415 COLL VENOUS BLD VENIPUNCTURE: CPT

## 2023-08-17 PROCEDURE — 80061 LIPID PANEL: CPT

## 2023-10-03 ENCOUNTER — HOSPITAL ENCOUNTER (OUTPATIENT)
Dept: PULMONOLOGY | Age: 88
Discharge: HOME | End: 2023-10-03
Attending: INTERNAL MEDICINE
Payer: MEDICARE

## 2023-10-03 DIAGNOSIS — R06.02 SHORTNESS OF BREATH: ICD-10-CM

## 2023-10-03 LAB
DLCO %PRED: NORMAL
DLCO PRED: NORMAL
DLCO/VA %PRED: NORMAL
DLCO/VA PRED: NORMAL
DLCO/VA: NORMAL
DLCO: NORMAL
EXPIRATORY TIME-POST: NORMAL
EXPIRATORY TIME: NORMAL
FEF 25-75% %CHNG: NORMAL
FEF 25-75% %PRED-POST: NORMAL
FEF 25-75% %PRED-PRE: NORMAL
FEF 25-75% PRED: NORMAL
FEF 25-75%-POST: NORMAL
FEF 25-75%-PRE: NORMAL
FEV1 %PRED-POST: NORMAL
FEV1 %PRED-PRE: NORMAL
FEV1 PRED: NORMAL
FEV1-POST: NORMAL
FEV1-PRE: NORMAL
FEV1/FVC %PRED-POST: NORMAL
FEV1/FVC %PRED-PRE: NORMAL
FEV1/FVC PRED: NORMAL
FEV1/FVC-POST: NORMAL
FEV1/FVC-PRE: NORMAL
FVC %PRED-POST: NORMAL
FVC %PRED-PRE: NORMAL
FVC PRED: NORMAL
FVC-POST: NORMAL
FVC-PRE: NORMAL
GAW %PRED: NORMAL
GAW PRED: NORMAL
GAW: NORMAL
IC %PRED: NORMAL
IC PRED: NORMAL
IC: NORMAL
MEP: NORMAL
MIP: NORMAL
MVV %PRED-PRE: NORMAL
MVV PRED: NORMAL
MVV-PRE: NORMAL
PEF %PRED-POST: NORMAL
PEF %PRED-PRE: NORMAL
PEF PRED: NORMAL
PEF%CHNG: NORMAL
PEF-POST: NORMAL
PEF-PRE: NORMAL
RAW %PRED: NORMAL
RAW PRED: NORMAL
RAW: NORMAL
RV %PRED: NORMAL
RV PRED: NORMAL
RV: NORMAL
SVC %PRED: NORMAL
SVC PRED: NORMAL
SVC: NORMAL
TLC %PRED: NORMAL
TLC PRED: NORMAL
TLC: NORMAL
VA %PRED: NORMAL
VA PRED: NORMAL
VA: NORMAL
VTG %PRED: NORMAL
VTG PRED: NORMAL
VTG: NORMAL

## 2023-10-03 PROCEDURE — 6370000000 HC RX 637 (ALT 250 FOR IP): Performed by: INTERNAL MEDICINE

## 2023-10-03 PROCEDURE — 94010 BREATHING CAPACITY TEST: CPT

## 2023-10-03 RX ORDER — ALBUTEROL SULFATE 90 UG/1
2 AEROSOL, METERED RESPIRATORY (INHALATION) ONCE
Status: COMPLETED | OUTPATIENT
Start: 2023-10-03 | End: 2023-10-03

## 2023-10-03 RX ADMIN — ALBUTEROL SULFATE 1 PUFF: 90 AEROSOL, METERED RESPIRATORY (INHALATION) at 14:47

## 2023-10-03 NOTE — PROCEDURES
Impression:      Mild restriction is suggested by spirometry. Testing was stopped after patient received 1 puff albuterol since patient started to cough. Patient had a hard time following directions. Consider total lung volumes for further evaluation. Clinical correlation is recommended.     Swapnil Summers MD  Pulmonary critical Care and sleep

## 2023-10-18 ENCOUNTER — HOSPITAL ENCOUNTER (OUTPATIENT)
Age: 88
Setting detail: SPECIMEN
Discharge: HOME | End: 2023-10-18
Payer: MEDICARE

## 2023-10-18 LAB
ANION GAP SERPL CALCULATED.3IONS-SCNC: 12 MMOL/L (ref 9–16)
BUN SERPL-MCNC: 22 MG/DL (ref 8–23)
CALCIUM SERPL-MCNC: 8.7 MG/DL (ref 8.6–10.4)
CHLORIDE SERPL-SCNC: 106 MMOL/L (ref 98–107)
CO2 SERPL-SCNC: 24 MMOL/L (ref 20–31)
CREAT SERPL-MCNC: 1.4 MG/DL (ref 0.7–1.2)
GFR SERPL CREATININE-BSD FRML MDRD: 49 ML/MIN/1.73M2
GLUCOSE SERPL-MCNC: 95 MG/DL (ref 74–99)
POTASSIUM SERPL-SCNC: 4.6 MMOL/L (ref 3.7–5.3)
SODIUM SERPL-SCNC: 142 MMOL/L (ref 136–145)

## 2023-10-18 PROCEDURE — 80048 BASIC METABOLIC PNL TOTAL CA: CPT

## 2023-10-18 PROCEDURE — 36415 COLL VENOUS BLD VENIPUNCTURE: CPT

## 2025-01-06 ENCOUNTER — APPOINTMENT (OUTPATIENT)
Dept: GENERAL RADIOLOGY | Age: 89
DRG: 291 | End: 2025-01-06
Payer: MEDICARE

## 2025-01-06 ENCOUNTER — HOSPITAL ENCOUNTER (INPATIENT)
Age: 89
LOS: 9 days | DRG: 291 | End: 2025-01-15
Attending: EMERGENCY MEDICINE | Admitting: FAMILY MEDICINE
Payer: MEDICARE

## 2025-01-06 DIAGNOSIS — F03.B0 MODERATE DEMENTIA WITHOUT BEHAVIORAL DISTURBANCE, PSYCHOTIC DISTURBANCE, MOOD DISTURBANCE, OR ANXIETY, UNSPECIFIED DEMENTIA TYPE (HCC): ICD-10-CM

## 2025-01-06 DIAGNOSIS — L89.899: ICD-10-CM

## 2025-01-06 DIAGNOSIS — I38 CHF DUE TO VALVULAR DISEASE (HCC): ICD-10-CM

## 2025-01-06 DIAGNOSIS — I50.9 ACUTE ON CHRONIC CONGESTIVE HEART FAILURE, UNSPECIFIED HEART FAILURE TYPE (HCC): Primary | ICD-10-CM

## 2025-01-06 DIAGNOSIS — I10 HYPERTENSION, UNSPECIFIED TYPE: ICD-10-CM

## 2025-01-06 DIAGNOSIS — I50.9 CHF DUE TO VALVULAR DISEASE (HCC): ICD-10-CM

## 2025-01-06 DIAGNOSIS — S81.802A OPEN LEG WOUND, LEFT, INITIAL ENCOUNTER: ICD-10-CM

## 2025-01-06 LAB
ALBUMIN SERPL-MCNC: 3.6 G/DL (ref 3.5–5.2)
ALBUMIN/GLOB SERPL: 1.5 {RATIO} (ref 1–2.5)
ALP SERPL-CCNC: 85 U/L (ref 40–129)
ALT SERPL-CCNC: 20 U/L (ref 10–50)
ANION GAP SERPL CALCULATED.3IONS-SCNC: 14 MMOL/L (ref 9–16)
AST SERPL-CCNC: 30 U/L (ref 10–50)
BASOPHILS # BLD: 0.05 K/UL (ref 0–0.2)
BASOPHILS NFR BLD: 1 % (ref 0–2)
BILIRUB SERPL-MCNC: 0.7 MG/DL (ref 0–1.2)
BNP SERPL-MCNC: 4715 PG/ML (ref 0–450)
BUN SERPL-MCNC: 30 MG/DL (ref 8–23)
CALCIUM SERPL-MCNC: 8.6 MG/DL (ref 8.8–10.2)
CHLORIDE SERPL-SCNC: 106 MMOL/L (ref 98–107)
CK SERPL-CCNC: 146 U/L (ref 39–308)
CO2 SERPL-SCNC: 19 MMOL/L (ref 20–31)
CREAT SERPL-MCNC: 1.4 MG/DL (ref 0.7–1.2)
EOSINOPHIL # BLD: 0.12 K/UL (ref 0–0.44)
EOSINOPHILS RELATIVE PERCENT: 1 % (ref 1–4)
ERYTHROCYTE [DISTWIDTH] IN BLOOD BY AUTOMATED COUNT: 16.6 % (ref 11.8–14.4)
GFR, ESTIMATED: 48 ML/MIN/1.73M2
GLUCOSE SERPL-MCNC: 105 MG/DL (ref 82–115)
HCT VFR BLD AUTO: 36.9 % (ref 40.7–50.3)
HGB BLD-MCNC: 11.4 G/DL (ref 13–17)
IMM GRANULOCYTES # BLD AUTO: 0.03 K/UL (ref 0–0.3)
IMM GRANULOCYTES NFR BLD: 0 %
LACTATE BLDV-SCNC: 1.5 MMOL/L (ref 0.5–2.2)
LYMPHOCYTES NFR BLD: 1.17 K/UL (ref 1.1–3.7)
LYMPHOCYTES RELATIVE PERCENT: 14 % (ref 24–43)
MCH RBC QN AUTO: 27.1 PG (ref 25.2–33.5)
MCHC RBC AUTO-ENTMCNC: 30.9 G/DL (ref 28.4–34.8)
MCV RBC AUTO: 87.6 FL (ref 82.6–102.9)
MONOCYTES NFR BLD: 0.97 K/UL (ref 0.1–1.2)
MONOCYTES NFR BLD: 11 % (ref 3–12)
MYOGLOBIN SERPL-MCNC: 187 NG/ML (ref 28–72)
NEUTROPHILS NFR BLD: 73 % (ref 36–65)
NEUTS SEG NFR BLD: 6.33 K/UL (ref 1.5–8.1)
NRBC BLD-RTO: 0 PER 100 WBC
PLATELET # BLD AUTO: 214 K/UL (ref 138–453)
PMV BLD AUTO: 9.4 FL (ref 8.1–13.5)
POTASSIUM SERPL-SCNC: 4.3 MMOL/L (ref 3.7–5.3)
POTASSIUM SERPL-SCNC: 5.5 MMOL/L (ref 3.7–5.3)
PROT SERPL-MCNC: 6 G/DL (ref 6.6–8.7)
RBC # BLD AUTO: 4.21 M/UL (ref 4.21–5.77)
RBC # BLD: ABNORMAL 10*6/UL
SODIUM SERPL-SCNC: 139 MMOL/L (ref 136–145)
TROPONIN I SERPL HS-MCNC: 108 NG/L (ref 0–22)
TROPONIN I SERPL HS-MCNC: 108 NG/L (ref 0–22)
WBC OTHER # BLD: 8.7 K/UL (ref 3.5–11.3)

## 2025-01-06 PROCEDURE — 6370000000 HC RX 637 (ALT 250 FOR IP): Performed by: PHYSICIAN ASSISTANT

## 2025-01-06 PROCEDURE — 2000000000 HC ICU R&B

## 2025-01-06 PROCEDURE — 73590 X-RAY EXAM OF LOWER LEG: CPT

## 2025-01-06 PROCEDURE — 83874 ASSAY OF MYOGLOBIN: CPT

## 2025-01-06 PROCEDURE — 85025 COMPLETE CBC W/AUTO DIFF WBC: CPT

## 2025-01-06 PROCEDURE — 80053 COMPREHEN METABOLIC PANEL: CPT

## 2025-01-06 PROCEDURE — 6360000002 HC RX W HCPCS: Performed by: PHYSICIAN ASSISTANT

## 2025-01-06 PROCEDURE — 84132 ASSAY OF SERUM POTASSIUM: CPT

## 2025-01-06 PROCEDURE — 83880 ASSAY OF NATRIURETIC PEPTIDE: CPT

## 2025-01-06 PROCEDURE — 82550 ASSAY OF CK (CPK): CPT

## 2025-01-06 PROCEDURE — 83605 ASSAY OF LACTIC ACID: CPT

## 2025-01-06 PROCEDURE — 93005 ELECTROCARDIOGRAM TRACING: CPT | Performed by: PHYSICIAN ASSISTANT

## 2025-01-06 PROCEDURE — 71045 X-RAY EXAM CHEST 1 VIEW: CPT

## 2025-01-06 PROCEDURE — 96374 THER/PROPH/DIAG INJ IV PUSH: CPT

## 2025-01-06 PROCEDURE — 99285 EMERGENCY DEPT VISIT HI MDM: CPT

## 2025-01-06 PROCEDURE — 84484 ASSAY OF TROPONIN QUANT: CPT

## 2025-01-06 PROCEDURE — 1200000000 HC SEMI PRIVATE

## 2025-01-06 PROCEDURE — 36415 COLL VENOUS BLD VENIPUNCTURE: CPT

## 2025-01-06 PROCEDURE — 6360000002 HC RX W HCPCS: Performed by: FAMILY MEDICINE

## 2025-01-06 RX ORDER — MORPHINE SULFATE 4 MG/ML
4 INJECTION, SOLUTION INTRAMUSCULAR; INTRAVENOUS ONCE
Status: DISCONTINUED | OUTPATIENT
Start: 2025-01-06 | End: 2025-01-15 | Stop reason: HOSPADM

## 2025-01-06 RX ORDER — MICONAZOLE NITRATE 20 MG/G
CREAM TOPICAL 2 TIMES DAILY
Status: DISCONTINUED | OUTPATIENT
Start: 2025-01-06 | End: 2025-01-15 | Stop reason: HOSPADM

## 2025-01-06 RX ORDER — ENOXAPARIN SODIUM 100 MG/ML
40 INJECTION SUBCUTANEOUS DAILY
Status: DISCONTINUED | OUTPATIENT
Start: 2025-01-07 | End: 2025-01-15 | Stop reason: HOSPADM

## 2025-01-06 RX ORDER — ONDANSETRON 4 MG/1
4 TABLET, ORALLY DISINTEGRATING ORAL EVERY 8 HOURS PRN
Status: DISCONTINUED | OUTPATIENT
Start: 2025-01-06 | End: 2025-01-15 | Stop reason: HOSPADM

## 2025-01-06 RX ORDER — QUETIAPINE FUMARATE 25 MG/1
25 TABLET, FILM COATED ORAL NIGHTLY
Status: DISCONTINUED | OUTPATIENT
Start: 2025-01-06 | End: 2025-01-08

## 2025-01-06 RX ORDER — ACETAMINOPHEN 325 MG/1
650 TABLET ORAL EVERY 8 HOURS PRN
Status: DISCONTINUED | OUTPATIENT
Start: 2025-01-06 | End: 2025-01-15 | Stop reason: HOSPADM

## 2025-01-06 RX ORDER — FUROSEMIDE 10 MG/ML
20 INJECTION INTRAMUSCULAR; INTRAVENOUS ONCE
Status: COMPLETED | OUTPATIENT
Start: 2025-01-06 | End: 2025-01-06

## 2025-01-06 RX ORDER — SODIUM CHLORIDE 9 MG/ML
INJECTION, SOLUTION INTRAVENOUS PRN
Status: DISCONTINUED | OUTPATIENT
Start: 2025-01-06 | End: 2025-01-15 | Stop reason: HOSPADM

## 2025-01-06 RX ORDER — ONDANSETRON 2 MG/ML
4 INJECTION INTRAMUSCULAR; INTRAVENOUS EVERY 6 HOURS PRN
Status: DISCONTINUED | OUTPATIENT
Start: 2025-01-06 | End: 2025-01-15 | Stop reason: HOSPADM

## 2025-01-06 RX ORDER — GINSENG 100 MG
CAPSULE ORAL ONCE
Status: COMPLETED | OUTPATIENT
Start: 2025-01-06 | End: 2025-01-06

## 2025-01-06 RX ORDER — SODIUM CHLORIDE 0.9 % (FLUSH) 0.9 %
5-40 SYRINGE (ML) INJECTION EVERY 12 HOURS SCHEDULED
Status: DISCONTINUED | OUTPATIENT
Start: 2025-01-06 | End: 2025-01-15 | Stop reason: HOSPADM

## 2025-01-06 RX ORDER — SODIUM CHLORIDE 0.9 % (FLUSH) 0.9 %
5-40 SYRINGE (ML) INJECTION PRN
Status: DISCONTINUED | OUTPATIENT
Start: 2025-01-06 | End: 2025-01-15 | Stop reason: HOSPADM

## 2025-01-06 RX ORDER — HALOPERIDOL 5 MG/ML
6 INJECTION INTRAMUSCULAR 3 TIMES DAILY PRN
Status: DISCONTINUED | OUTPATIENT
Start: 2025-01-06 | End: 2025-01-15 | Stop reason: HOSPADM

## 2025-01-06 RX ADMIN — MICONAZOLE NITRATE: 20 CREAM TOPICAL at 18:55

## 2025-01-06 RX ADMIN — BACITRACIN: 500 OINTMENT TOPICAL at 18:54

## 2025-01-06 RX ADMIN — FUROSEMIDE 20 MG: 10 INJECTION, SOLUTION INTRAMUSCULAR; INTRAVENOUS at 16:47

## 2025-01-06 RX ADMIN — HALOPERIDOL LACTATE 6 MG: 5 INJECTION, SOLUTION INTRAMUSCULAR at 22:17

## 2025-01-06 ASSESSMENT — PAIN - FUNCTIONAL ASSESSMENT: PAIN_FUNCTIONAL_ASSESSMENT: 0-10

## 2025-01-06 ASSESSMENT — ENCOUNTER SYMPTOMS
ABDOMINAL PAIN: 0
COLOR CHANGE: 0
COUGH: 0
NAUSEA: 0
VOMITING: 0
WHEEZING: 0

## 2025-01-06 ASSESSMENT — PAIN SCALES - GENERAL: PAINLEVEL_OUTOF10: 0

## 2025-01-06 NOTE — ED NOTES
The following labs labeled with pt sticker and sent to Lab:     [x] Lavender     [] Blue   [x] Green/yellow  [x] Drk Green/[place]   [] Pink  [] Red  [] Yellow     [] Blood Cultures     [] COVID-19 swab    [] Rapid   [] Viral Swab  [] Wound Swab    [] Urine Sample  [] Pelvic Cultures

## 2025-01-06 NOTE — ED NOTES
Pt presents to ER from home due to wound check. Pt's left leg is swollen. Wounds noted with drainage.Pt's pedal pulse is moderate (+2). Pt has hx of dementia and is A/O to self.PT states his cat may have scratched him. Pt has difficulty ambulating .Wife states swelling started 3-4 days ago. Pt is A/O x 4, equal chest expansion with non labored breathing wheels locked, bed in lowest position, call light in reach.

## 2025-01-06 NOTE — ED NOTES
RN at bedside to complete EKG. Patients s/o states that she is leaving before it gets dark. Emergency contact information verified.

## 2025-01-06 NOTE — ED PROVIDER NOTES
EMERGENCY DEPARTMENT ENCOUNTER    Pt Name: Jose Robison  MRN: [id number]  Birthdate 1935  Date of evaluation: 1/6/25  CHIEF COMPLAINT       Chief Complaint   Patient presents with    Wound Check     Left leg, pt's wife and pt very poor historians. Wife states she noticed leg swelling \"a few days ago\" and that pt has dementia. Pt states he is not sure what caused wound on left leg. Pt's wife states that he does wander over night and could of possibly burned leg.      HISTORY OF PRESENT ILLNESS   Patient is an 89-year-old male who presents with a wound on the left leg.  The patient's wife who is very hard of hearing is only able to tell us that he was up most of the night, she is not sure why.  She made him breakfast in the morning and then he went back to bed and when she woke him up around 1130 he was complaining of left leg pain.  The patient states that he might have gotten tangled in some sort of construction material?  He has severe dementia and cannot give us much information.  The patient's wife does state that he fell twice in the past couple of weeks.             REVIEW OF SYSTEMS     Review of Systems   Constitutional:  Negative for chills and fever.   Respiratory:  Negative for cough and wheezing.    Cardiovascular:  Negative for chest pain.   Gastrointestinal:  Negative for abdominal pain, nausea and vomiting.   Musculoskeletal:  Positive for arthralgias and myalgias.   Skin:  Positive for wound. Negative for color change.     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

## 2025-01-06 NOTE — ED PROVIDER NOTES
EMERGENCY DEPARTMENT ENCOUNTER   ATTENDING ATTESTATION     Pt Name: Jose Robison  MRN: [id number]  Birthdate 1935  Date of evaluation: 1/6/25   Jose Robison is a 89 y.o. male with CC: Wound Check (Left leg, pt's wife and pt very poor historians. Wife states she noticed leg swelling \"a few days ago\" and that pt has dementia. Pt states he is not sure what caused wound on left leg. Pt's wife states that he does wander over night and could of possibly burned leg. )    MDM:   I performed a substantive part of the MDM during the patient's E/M visit. I personally made or approved the documented management plan and acknowledge its risk of complications.    Independent Interpretation of EKG: Atrial fibrillation ventricular rate 94  Nonspecific ST changes  No STEMI criteria  QTc 455    ED Course as of 01/06/25 1747   Mon Jan 06, 2025   1656 Spoke to Dr. Gresham.  Will add on the other heart labs, talk to Dr. Glover who he did see for CHF and other heart related complications in 2022 [AGAPITO]   1708 Dr. Jenkins will see him tomorrow [AGAPITO]      ED Course User Index  [AGAPITO] Flower Major PA-C       CRITICAL CARE:           RADIOLOGY:All plain film, CT, MRI, and formal ultrasound images (except ED bedside ultrasound) are read by the radiologist, see reports below, unless otherwise noted in MDM or here.  XR CHEST 1 VIEW   Final Result   Cardiomegaly with mild interstitial edema and small right pleural effusion.   This is likely related to CHF.         XR TIBIA FIBULA LEFT (2 VIEWS)   Final Result   No acute osseous abnormality.           LABS: All lab results were reviewed by myself, and all abnormals are listed below.  Labs Reviewed   CBC WITH AUTO DIFFERENTIAL - Abnormal; Notable for the following components:       Result Value    Hemoglobin 11.4 (*)     Hematocrit 36.9 (*)     RDW 16.6 (*)     Neutrophils % 73 (*)     Lymphocytes % 14 (*)     All other components within normal limits   COMPREHENSIVE METABOLIC PANEL - Abnormal;  Notable for the following components:    Potassium 5.5 (*)     CO2 19 (*)     BUN 30 (*)     Creatinine 1.4 (*)     Est, Glom Filt Rate 48 (*)     Calcium 8.6 (*)     Total Protein 6.0 (*)     All other components within normal limits   MYOGLOBIN, BLOOD - Abnormal; Notable for the following components:    Myoglobin 187 (*)     All other components within normal limits   BRAIN NATRIURETIC PEPTIDE - Abnormal; Notable for the following components:    NT Pro-BNP 4,715 (*)     All other components within normal limits   TROPONIN - Abnormal; Notable for the following components:    Troponin, High Sensitivity 108 (*)     All other components within normal limits   LACTIC ACID   CK     CONSULTS:  IP CONSULT TO HOSPITALIST  IP CONSULT TO CARDIOLOGY  FINAL IMPRESSION      1. Acute on chronic congestive heart failure, unspecified heart failure type ([facility])    2. Open leg wound, left, initial encounter    3. Moderate dementia without behavioral disturbance, psychotic disturbance, mood disturbance, or anxiety, unspecified dementia type ([facility])            PASTMEDICAL HISTORY     Past Medical History:   Diagnosis Date    Acute renal failure (ARF) ([facility]) 1/16/2016    Bleeding in brain ([facility])     after fall    Coronary artery disease involving native coronary artery of native heart without angina pectoris 1/28/2016    Gastritis     Heart attack ([facility])     History of blood transfusion     Hyperlipidemia     Hypertension     Lumbar spinal stenosis 11/8/2017    Osteoarthritis     Rectal bleeding      SURGICAL HISTORY       Past Surgical History:   Procedure Laterality Date    APPENDECTOMY      BACK SURGERY  40 YEARS AGO    CORONARY ANGIOPLASTY WITH STENT PLACEMENT      HAND SURGERY Left     HERNIA REPAIR      OTHER SURGICAL HISTORY      scalp surgery    UPPER GASTROINTESTINAL ENDOSCOPY  01/18/16    UPPER GASTROINTESTINAL ENDOSCOPY  1/18/2016    large and deep ulcer duodenal bulb, minimal bleeding    UPPER GASTROINTESTINAL ENDOSCOPY  3/16/16

## 2025-01-07 ENCOUNTER — APPOINTMENT (OUTPATIENT)
Dept: VASCULAR LAB | Age: 89
DRG: 291 | End: 2025-01-07
Attending: INTERNAL MEDICINE
Payer: MEDICARE

## 2025-01-07 ENCOUNTER — APPOINTMENT (OUTPATIENT)
Age: 89
DRG: 291 | End: 2025-01-07
Attending: FAMILY MEDICINE
Payer: MEDICARE

## 2025-01-07 LAB
ALBUMIN SERPL-MCNC: 3.2 G/DL (ref 3.5–5.2)
ALBUMIN/GLOB SERPL: 1.6 {RATIO} (ref 1–2.5)
ALP SERPL-CCNC: 73 U/L (ref 40–129)
ALT SERPL-CCNC: 17 U/L (ref 10–50)
ANION GAP SERPL CALCULATED.3IONS-SCNC: 12 MMOL/L (ref 9–16)
AST SERPL-CCNC: 28 U/L (ref 10–50)
BILIRUB SERPL-MCNC: 0.8 MG/DL (ref 0–1.2)
BUN SERPL-MCNC: 32 MG/DL (ref 8–23)
CALCIUM SERPL-MCNC: 8.7 MG/DL (ref 8.8–10.2)
CHLORIDE SERPL-SCNC: 108 MMOL/L (ref 98–107)
CO2 SERPL-SCNC: 23 MMOL/L (ref 20–31)
CREAT SERPL-MCNC: 1.5 MG/DL (ref 0.7–1.2)
ECHO AO ROOT DIAM: 2.8 CM
ECHO AO ROOT INDEX: 1.39 CM/M2
ECHO AV MEAN GRADIENT: 2 MMHG
ECHO AV MEAN VELOCITY: 0.6 M/S
ECHO AV PEAK GRADIENT: 4 MMHG
ECHO AV PEAK VELOCITY: 1 M/S
ECHO AV VELOCITY RATIO: 0.7
ECHO AV VTI: 20.6 CM
ECHO BSA: 2.07 M2
ECHO BSA: 2.07 M2
ECHO EST RA PRESSURE: 8 MMHG
ECHO LA AREA 2C: 23 CM2
ECHO LA AREA 4C: 23.6 CM2
ECHO LA DIAMETER INDEX: 2.39 CM/M2
ECHO LA DIAMETER: 4.8 CM
ECHO LA MAJOR AXIS: 6.5 CM
ECHO LA MINOR AXIS: 5.8 CM
ECHO LA TO AORTIC ROOT RATIO: 1.71
ECHO LA VOL BP: 76 ML (ref 18–58)
ECHO LA VOL MOD A2C: 74 ML (ref 18–58)
ECHO LA VOL MOD A4C: 70 ML (ref 18–58)
ECHO LA VOL/BSA BIPLANE: 38 ML/M2 (ref 16–34)
ECHO LA VOLUME INDEX MOD A2C: 37 ML/M2 (ref 16–34)
ECHO LA VOLUME INDEX MOD A4C: 35 ML/M2 (ref 16–34)
ECHO LV E' LATERAL VELOCITY: 8.92 CM/S
ECHO LV E' SEPTAL VELOCITY: 7.62 CM/S
ECHO LV EDV A2C: 98 ML
ECHO LV EDV A4C: 95 ML
ECHO LV EDV INDEX A4C: 47 ML/M2
ECHO LV EDV NDEX A2C: 49 ML/M2
ECHO LV EJECTION FRACTION A2C: 56 %
ECHO LV EJECTION FRACTION A4C: 57 %
ECHO LV EJECTION FRACTION BIPLANE: 57 % (ref 55–100)
ECHO LV ESV A2C: 43 ML
ECHO LV ESV A4C: 41 ML
ECHO LV ESV INDEX A2C: 21 ML/M2
ECHO LV ESV INDEX A4C: 20 ML/M2
ECHO LV FRACTIONAL SHORTENING: 35 % (ref 28–44)
ECHO LV INTERNAL DIMENSION DIASTOLE INDEX: 1.99 CM/M2
ECHO LV INTERNAL DIMENSION DIASTOLIC: 4 CM (ref 4.2–5.9)
ECHO LV INTERNAL DIMENSION SYSTOLIC INDEX: 1.29 CM/M2
ECHO LV INTERNAL DIMENSION SYSTOLIC: 2.6 CM
ECHO LV IVSD: 1.2 CM (ref 0.6–1)
ECHO LV MASS 2D: 186.5 G (ref 88–224)
ECHO LV MASS INDEX 2D: 92.8 G/M2 (ref 49–115)
ECHO LV POSTERIOR WALL DIASTOLIC: 1.4 CM (ref 0.6–1)
ECHO LV RELATIVE WALL THICKNESS RATIO: 0.7
ECHO LVOT PEAK GRADIENT: 2 MMHG
ECHO LVOT PEAK VELOCITY: 0.7 M/S
ECHO MV A VELOCITY: 0.52 M/S
ECHO MV E DECELERATION TIME (DT): 151 MS
ECHO MV E VELOCITY: 1.26 M/S
ECHO MV E/A RATIO: 2.42
ECHO MV E/E' LATERAL: 14.13
ECHO MV E/E' RATIO (AVERAGED): 15.33
ECHO MV E/E' SEPTAL: 16.54
ECHO RIGHT VENTRICULAR SYSTOLIC PRESSURE (RVSP): 43 MMHG
ECHO TV REGURGITANT MAX VELOCITY: 2.96 M/S
ECHO TV REGURGITANT PEAK GRADIENT: 35 MMHG
ERYTHROCYTE [DISTWIDTH] IN BLOOD BY AUTOMATED COUNT: 16.4 % (ref 11.8–14.4)
GFR, ESTIMATED: 46 ML/MIN/1.73M2
GLUCOSE SERPL-MCNC: 97 MG/DL (ref 82–115)
HCO3 VENOUS: 23.6 MMOL/L (ref 22–29)
HCT VFR BLD AUTO: 32.4 % (ref 40.7–50.3)
HGB BLD-MCNC: 10 G/DL (ref 13–17)
IRON SATN MFR SERPL: 8 % (ref 20–55)
IRON SERPL-MCNC: 24 UG/DL (ref 61–157)
MCH RBC QN AUTO: 27.2 PG (ref 25.2–33.5)
MCHC RBC AUTO-ENTMCNC: 30.9 G/DL (ref 28.4–34.8)
MCV RBC AUTO: 88 FL (ref 82.6–102.9)
NEGATIVE BASE EXCESS, VEN: 1.6 MMOL/L (ref 0–2)
NRBC BLD-RTO: 0 PER 100 WBC
O2 SAT, VEN: 99.7 % (ref 60–85)
PCO2 VENOUS: 40.7 MM HG (ref 41–51)
PH VENOUS: 7.37 (ref 7.32–7.43)
PLATELET # BLD AUTO: 195 K/UL (ref 138–453)
PMV BLD AUTO: 9 FL (ref 8.1–13.5)
PO2 VENOUS: 206.7 MM HG (ref 30–50)
POTASSIUM SERPL-SCNC: 4.1 MMOL/L (ref 3.7–5.3)
PROT SERPL-MCNC: 5.2 G/DL (ref 6.6–8.7)
RBC # BLD AUTO: 3.68 M/UL (ref 4.21–5.77)
SODIUM SERPL-SCNC: 142 MMOL/L (ref 136–145)
TIBC SERPL-MCNC: 303 UG/DL (ref 250–450)
UNSATURATED IRON BINDING CAPACITY: 279 UG/DL (ref 112–347)
VAS LEFT ABI: 1.29
VAS LEFT ANKLE BP: 195 MMHG
VAS LEFT ARM BP: 151 MMHG
VAS LEFT CALF PRESSURE: 202 MMHG
VAS LEFT DORSALIS PEDIS BP: 177 MMHG
VAS LEFT PTA BP: 195 MMHG
VAS LEFT THIGH PRESSURE: 200 MMHG
VAS RIGHT ABI: 1.28
VAS RIGHT ANKLE BP: 194 MMHG
VAS RIGHT ARM BP: 139 MMHG
VAS RIGHT CALF PRESSURE: 255 MMHG
VAS RIGHT DORSALIS PEDIS BP: 188 MMHG
VAS RIGHT PTA BP: 194 MMHG
VAS RIGHT THIGH PRESSURE: 197 MMHG
WBC OTHER # BLD: 9.5 K/UL (ref 3.5–11.3)

## 2025-01-07 PROCEDURE — 93306 TTE W/DOPPLER COMPLETE: CPT

## 2025-01-07 PROCEDURE — 2500000003 HC RX 250 WO HCPCS: Performed by: FAMILY MEDICINE

## 2025-01-07 PROCEDURE — 94761 N-INVAS EAR/PLS OXIMETRY MLT: CPT

## 2025-01-07 PROCEDURE — 2580000003 HC RX 258: Performed by: FAMILY MEDICINE

## 2025-01-07 PROCEDURE — 83550 IRON BINDING TEST: CPT

## 2025-01-07 PROCEDURE — 85027 COMPLETE CBC AUTOMATED: CPT

## 2025-01-07 PROCEDURE — 93923 UPR/LXTR ART STDY 3+ LVLS: CPT

## 2025-01-07 PROCEDURE — 6360000002 HC RX W HCPCS: Performed by: FAMILY MEDICINE

## 2025-01-07 PROCEDURE — 80053 COMPREHEN METABOLIC PANEL: CPT

## 2025-01-07 PROCEDURE — 99291 CRITICAL CARE FIRST HOUR: CPT | Performed by: FAMILY MEDICINE

## 2025-01-07 PROCEDURE — 6360000002 HC RX W HCPCS: Performed by: INTERNAL MEDICINE

## 2025-01-07 PROCEDURE — 36415 COLL VENOUS BLD VENIPUNCTURE: CPT

## 2025-01-07 PROCEDURE — 93923 UPR/LXTR ART STDY 3+ LVLS: CPT | Performed by: SURGERY

## 2025-01-07 PROCEDURE — 87040 BLOOD CULTURE FOR BACTERIA: CPT

## 2025-01-07 PROCEDURE — 83540 ASSAY OF IRON: CPT

## 2025-01-07 PROCEDURE — 6370000000 HC RX 637 (ALT 250 FOR IP): Performed by: INTERNAL MEDICINE

## 2025-01-07 PROCEDURE — 82803 BLOOD GASES ANY COMBINATION: CPT

## 2025-01-07 PROCEDURE — 51702 INSERT TEMP BLADDER CATH: CPT

## 2025-01-07 PROCEDURE — 2000000000 HC ICU R&B

## 2025-01-07 PROCEDURE — 6370000000 HC RX 637 (ALT 250 FOR IP): Performed by: FAMILY MEDICINE

## 2025-01-07 PROCEDURE — 2700000000 HC OXYGEN THERAPY PER DAY

## 2025-01-07 RX ORDER — BUMETANIDE 0.25 MG/ML
1 INJECTION, SOLUTION INTRAMUSCULAR; INTRAVENOUS DAILY
Status: DISCONTINUED | OUTPATIENT
Start: 2025-01-07 | End: 2025-01-09

## 2025-01-07 RX ORDER — METOPROLOL TARTRATE 25 MG/1
25 TABLET, FILM COATED ORAL 2 TIMES DAILY
Status: DISCONTINUED | OUTPATIENT
Start: 2025-01-07 | End: 2025-01-08

## 2025-01-07 RX ORDER — ISOSORBIDE MONONITRATE 60 MG/1
90 TABLET, EXTENDED RELEASE ORAL DAILY
COMMUNITY

## 2025-01-07 RX ORDER — SENNOSIDES 8.6 MG
650 CAPSULE ORAL EVERY 8 HOURS PRN
COMMUNITY

## 2025-01-07 RX ORDER — METOPROLOL SUCCINATE 100 MG/1
100 TABLET, EXTENDED RELEASE ORAL DAILY
COMMUNITY

## 2025-01-07 RX ADMIN — QUETIAPINE FUMARATE 25 MG: 25 TABLET ORAL at 21:30

## 2025-01-07 RX ADMIN — BUMETANIDE 1 MG: 0.25 INJECTION INTRAMUSCULAR; INTRAVENOUS at 17:24

## 2025-01-07 RX ADMIN — METOPROLOL TARTRATE 25 MG: 25 TABLET, FILM COATED ORAL at 21:30

## 2025-01-07 RX ADMIN — SODIUM CHLORIDE, PRESERVATIVE FREE 10 ML: 5 INJECTION INTRAVENOUS at 10:33

## 2025-01-07 RX ADMIN — MICONAZOLE NITRATE: 20 CREAM TOPICAL at 21:30

## 2025-01-07 RX ADMIN — DEXMEDETOMIDINE 0.6 MCG/KG/HR: 100 INJECTION, SOLUTION INTRAVENOUS at 06:13

## 2025-01-07 RX ADMIN — DEXMEDETOMIDINE 0.2 MCG/KG/HR: 100 INJECTION, SOLUTION INTRAVENOUS at 00:02

## 2025-01-07 RX ADMIN — SODIUM CHLORIDE, PRESERVATIVE FREE 10 ML: 5 INJECTION INTRAVENOUS at 21:33

## 2025-01-07 RX ADMIN — ENOXAPARIN SODIUM 40 MG: 100 INJECTION SUBCUTANEOUS at 09:34

## 2025-01-07 RX ADMIN — MICONAZOLE NITRATE: 20 CREAM TOPICAL at 09:35

## 2025-01-07 ASSESSMENT — PAIN SCALES - PAIN ASSESSMENT IN ADVANCED DEMENTIA (PAINAD)
BODYLANGUAGE: RELAXED
BREATHING: NORMAL
NEGVOCALIZATION: REPEATED TROUBLED CALLING OUT, LOUD MOANING/GROANING, CRYING
CONSOLABILITY: NO NEED TO CONSOLE
TOTALSCORE: 0
BREATHING: NORMAL
BREATHING: NORMAL
TOTALSCORE: 0
FACIALEXPRESSION: SMILING OR INEXPRESSIVE
FACIALEXPRESSION: FACIAL GRIMACING
BODYLANGUAGE: RIGID, FISTS CLENCHED, KNEES UP, PUSHING/PULLING AWAY, STRIKES OUT
CONSOLABILITY: NO NEED TO CONSOLE
TOTALSCORE: 8
BODYLANGUAGE: RELAXED
FACIALEXPRESSION: SMILING OR INEXPRESSIVE
CONSOLABILITY: UNABLE TO CONSOLE, DISTRACT OR REASSURE

## 2025-01-07 ASSESSMENT — PAIN SCALES - GENERAL
PAINLEVEL_OUTOF10: 0
PAINLEVEL_OUTOF10: 0
PAINLEVEL_OUTOF10: 8

## 2025-01-07 NOTE — ED NOTES
Px bacitracin placed at sites of L lower leg wounds and placed non adherent pads w coban over main sites

## 2025-01-07 NOTE — PLAN OF CARE
Problem: Safety - Adult  Goal: Free from fall injury  Outcome: Progressing     Problem: Chronic Conditions and Co-morbidities  Goal: Patient's chronic conditions and co-morbidity symptoms are monitored and maintained or improved  Outcome: Progressing  Flowsheets (Taken 1/6/2025 2321 by Aline Loja, RN)  Care Plan - Patient's Chronic Conditions and Co-Morbidity Symptoms are Monitored and Maintained or Improved:   Monitor and assess patient's chronic conditions and comorbid symptoms for stability, deterioration, or improvement   Collaborate with multidisciplinary team to address chronic and comorbid conditions and prevent exacerbation or deterioration     Problem: Discharge Planning  Goal: Discharge to home or other facility with appropriate resources  Outcome: Progressing  Flowsheets (Taken 1/6/2025 2321 by Aline Loja, RN)  Discharge to home or other facility with appropriate resources:   Identify barriers to discharge with patient and caregiver   Arrange for needed discharge resources and transportation as appropriate   Identify discharge learning needs (meds, wound care, etc)     Problem: Pain  Goal: Verbalizes/displays adequate comfort level or baseline comfort level  Outcome: Progressing  Flowsheets (Taken 1/7/2025 0000)  Verbalizes/displays adequate comfort level or baseline comfort level: Assess pain using appropriate pain scale     Problem: Safety - Medical Restraint  Goal: Remains free of injury from restraints (Restraint for Interference with Medical Device)  Description: INTERVENTIONS:  1. Determine that other, less restrictive measures have been tried or would not be effective before applying the restraint  2. Evaluate the patient's condition at the time of restraint application  3. Inform patient/family regarding the reason for restraint  4. Q2H: Monitor safety, psychosocial status, comfort, nutrition and hydration  Outcome: Progressing  Flowsheets  Taken 1/7/2025 0400  Remains free of injury  from restraints (restraint for interference with medical device):   Determine that other, less restrictive measures have been tried or would not be effective before applying the restraint   Evaluate the patient's condition at the time of restraint application   Inform patient/family regarding the reason for restraint   Every 2 hours: Monitor safety, psychosocial status, comfort, nutrition and hydration  Taken 1/7/2025 0005  Remains free of injury from restraints (restraint for interference with medical device):   Determine that other, less restrictive measures have been tried or would not be effective before applying the restraint   Evaluate the patient's condition at the time of restraint application   Inform patient/family regarding the reason for restraint   Every 2 hours: Monitor safety, psychosocial status, comfort, nutrition and hydration

## 2025-01-07 NOTE — PLAN OF CARE
Problem: Safety - Adult  Goal: Free from fall injury  1/7/2025 1814 by Jose M Hanson RN  Outcome: Progressing  1/7/2025 0451 by Hannah Cabrera RN  Outcome: Progressing     Problem: Chronic Conditions and Co-morbidities  Goal: Patient's chronic conditions and co-morbidity symptoms are monitored and maintained or improved  1/7/2025 1814 by Jose M Hanson RN  Outcome: Progressing  1/7/2025 0451 by Hannah Cabrera RN  Outcome: Progressing  Flowsheets  Taken 1/7/2025 0000 by Hannah Cabrera RN  Care Plan - Patient's Chronic Conditions and Co-Morbidity Symptoms are Monitored and Maintained or Improved:   Monitor and assess patient's chronic conditions and comorbid symptoms for stability, deterioration, or improvement   Collaborate with multidisciplinary team to address chronic and comorbid conditions and prevent exacerbation or deterioration   Update acute care plan with appropriate goals if chronic or comorbid symptoms are exacerbated and prevent overall improvement and discharge  Taken 1/6/2025 2321 by Aline Loja RN  Care Plan - Patient's Chronic Conditions and Co-Morbidity Symptoms are Monitored and Maintained or Improved:   Monitor and assess patient's chronic conditions and comorbid symptoms for stability, deterioration, or improvement   Collaborate with multidisciplinary team to address chronic and comorbid conditions and prevent exacerbation or deterioration     Problem: Discharge Planning  Goal: Discharge to home or other facility with appropriate resources  1/7/2025 1830 by Jose M Hanson RN  Outcome: Progressing  1/7/2025 1829 by Jose M Hanson RN  Outcome: Progressing  1/7/2025 1814 by Jose M Hanson RN  Outcome: Progressing  1/7/2025 0451 by Hannah Cabrera RN  Outcome: Progressing  Flowsheets  Taken 1/7/2025 0000 by Hannah Cabrera RN  Discharge to home or other facility with appropriate resources:   Identify barriers to discharge with patient and caregiver   Arrange for needed discharge resources

## 2025-01-07 NOTE — PLAN OF CARE
Problem: Safety - Adult  Goal: Free from fall injury  1/7/2025 1814 by Jose M Hanson RN  Outcome: Progressing  1/7/2025 0451 by Hannah Cabrera RN  Outcome: Progressing     Problem: Chronic Conditions and Co-morbidities  Goal: Patient's chronic conditions and co-morbidity symptoms are monitored and maintained or improved  1/7/2025 1814 by Jose M Hanson RN  Outcome: Progressing  1/7/2025 0451 by Hannah Cabrera RN  Outcome: Progressing  Flowsheets  Taken 1/7/2025 0000 by Hannah Cabrera RN  Care Plan - Patient's Chronic Conditions and Co-Morbidity Symptoms are Monitored and Maintained or Improved:   Monitor and assess patient's chronic conditions and comorbid symptoms for stability, deterioration, or improvement   Collaborate with multidisciplinary team to address chronic and comorbid conditions and prevent exacerbation or deterioration   Update acute care plan with appropriate goals if chronic or comorbid symptoms are exacerbated and prevent overall improvement and discharge  Taken 1/6/2025 2321 by Aline Loja RN  Care Plan - Patient's Chronic Conditions and Co-Morbidity Symptoms are Monitored and Maintained or Improved:   Monitor and assess patient's chronic conditions and comorbid symptoms for stability, deterioration, or improvement   Collaborate with multidisciplinary team to address chronic and comorbid conditions and prevent exacerbation or deterioration     Problem: Discharge Planning  Goal: Discharge to home or other facility with appropriate resources  1/7/2025 1829 by Jose M Hanson RN  Outcome: Progressing  1/7/2025 1814 by Jose M Hanson RN  Outcome: Progressing  1/7/2025 0451 by Hannah Cabrera RN  Outcome: Progressing  Flowsheets  Taken 1/7/2025 0000 by Hannah Cabrera RN  Discharge to home or other facility with appropriate resources:   Identify barriers to discharge with patient and caregiver   Arrange for needed discharge resources and transportation as appropriate   Identify discharge  learning needs (meds, wound care, etc)  Taken 1/6/2025 2321 by Aline Loja RN  Discharge to home or other facility with appropriate resources:   Identify barriers to discharge with patient and caregiver   Arrange for needed discharge resources and transportation as appropriate   Identify discharge learning needs (meds, wound care, etc)     Problem: Pain  Goal: Verbalizes/displays adequate comfort level or baseline comfort level  1/7/2025 1829 by Jose M Hanson RN  Outcome: Progressing  1/7/2025 1814 by Jose M Hanson RN  Outcome: Progressing  1/7/2025 0451 by Hannah Cabrera RN  Outcome: Progressing  Flowsheets (Taken 1/7/2025 0000)  Verbalizes/displays adequate comfort level or baseline comfort level: Assess pain using appropriate pain scale     Problem: Safety - Medical Restraint  Goal: Remains free of injury from restraints (Restraint for Interference with Medical Device)  Description: INTERVENTIONS:  1. Determine that other, less restrictive measures have been tried or would not be effective before applying the restraint  2. Evaluate the patient's condition at the time of restraint application  3. Inform patient/family regarding the reason for restraint  4. Q2H: Monitor safety, psychosocial status, comfort, nutrition and hydration  1/7/2025 1829 by Jose M Hanson RN  Outcome: Progressing  1/7/2025 1814 by Jose M Hanson RN  Outcome: Progressing  1/7/2025 0451 by Hannah Cabrera RN  Outcome: Progressing  Flowsheets  Taken 1/7/2025 0400  Remains free of injury from restraints (restraint for interference with medical device):   Determine that other, less restrictive measures have been tried or would not be effective before applying the restraint   Evaluate the patient's condition at the time of restraint application   Inform patient/family regarding the reason for restraint   Every 2 hours: Monitor safety, psychosocial status, comfort, nutrition and hydration  Taken 1/7/2025 0005  Remains free of injury from

## 2025-01-07 NOTE — PROGRESS NOTES
Spiritual Health History and Assessment/Progress Note  [facility]    (P) Initial Encounter,  ,  ,      Name: Jose Robison MRN: [id number]    Age: 89 y.o.     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  provided supportive presence.    Angelina, Belief, Meaning:   Patient unable to assess at this time  Family/Friends No family/friends present      Importance and Influence:  Patient unable to assess at this time  Family/Friends No family/friends present    Community:  Patient Other: Spouse and daughter listed in patient's chart  Family/Friends No family/friends present    Assessment and Plan of Care:     Patient Interventions include: Other: supportive presence  Family/Friends Interventions include: No family/friends present    Patient Plan of Care: Spiritual Care available upon further referral  Family/Friends Plan of Care: Spiritual Care available upon further referral    Electronically signed by Chaplain Juan on 1/7/2025 at 11:44 AM

## 2025-01-07 NOTE — PROGRESS NOTES
Mp notified that patient still combative. Orders to transfer pt, consult to critical care, and recommendation to start precedex drip.

## 2025-01-07 NOTE — CARE COORDINATION
Case Management Assessment  Initial Evaluation    Date/Time of Evaluation: 1/7/2025 4:46 PM  Assessment Completed by: ISMAEL DRISCOLL RN    If patient is discharged prior to next notation, then this note serves as note for discharge by case management.    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 Yes    Chart Reviewed: Yes      History Provided by: Spouse  Patient Orientation: Alert and Oriented, Person    Patient Cognition: Alert    Hospitalization in the last 30 days (Readmission):  No    If yes, Readmission Assessment in  Navigator will be completed.    Advance Directives:      Code Status: Full Code   Patient's Primary Decision Maker is: Legal Next of Kin      Discharge Planning:    Patient lives with: Spouse/Significant Other Type of Home: House  Primary Care Giver: Spouse  Patient Support Systems include: Spouse/Significant Other   Current Financial resources: None  Current community resources: None  Current services prior to admission: Durable Medical Equipment            Current DME: Shower Chair, Cane, Walker ([email or web address])            Type of Home Care services:  PT, OT, Skilled Therapy, Aide Services, Nursing Services    ADLS  Prior functional level: Assistance with the following:, Cooking, Housework, Shopping, Mobility, Bathing, Toileting, Dressing  Current functional level: Assistance with the following:,  Bathing, Dressing, Toileting, Cooking, Housework, Shopping, Mobility    PT AM-PAC:   /24  OT AM-PAC:   /24    Family can provide assistance at DC: No  Would you like Case Management to discuss the discharge plan with any other family members/significant others, and if so, who? Yes (spouse)  Plans to Return to Present Housing: Unknown at present  Other Identified Issues/Barriers to RETURNING to current housing: medical condition  Potential Assistance needed at discharge: Skilled Nursing Facility            Potential DME:    Patient expects to discharge to: Unknown  Plan for transportation at discharge: Family    Financial    Payor: MEDICARE / Plan: MEDICARE PART A AND B / Product Type: *No Product type* /     Does insurance require precert for SNF: No    Potential assistance Purchasing Medications: No  Meds-to-Beds request:        [id number] - [place], [place] - [address] - P [phone] - F [phone]  [address]  [facility] [address]  Phone: [phone] Fax: [phone]    [facility] [place] - [facility] - P [phone] - F [phone]  [facility]  [address] [place] [address]  Phone: [phone] Fax: [phone]    [id number] - [place], [place] - [address] - P [phone] - F [phone] [address]  [facility] [address]  Phone: [phone] Fax: [phone]      Notes:    Factors facilitating achievement of predicted outcomes: dementia/confusion    Barriers to discharge: Limited family support, Limited safety awareness, Limited insight into deficits, Unrealistic expectations, and Communication deficit, decreased endurance, medical complication.     Additional Case Management Notes: CHF. Dementia. Spoke with wife. Has not seen PCP in over 2 years. Did see Dr. Burt/cardiology 4 months ago. Lives with wife in a 1 story home. No other family support. PT/OT to eval. Asked for neurology consult. Cardiology consulted. Plan for a

## 2025-01-07 NOTE — PROGRESS NOTES
Pharmacy Medication Review    The patient's list of current home medications has been reviewed.     PHYSICIANS AND NURSE PRACTITIONERS: please note there is no Transitions of Care/Med Rec Pharmacist available to address any discrepancies on inpatient orders. It is the responsibility of the attending provider to review the updates made to the home med list and adjust inpatient orders as appropriate.        Source(s) of information: [facility] refill report and [facility] pharmacy        Based on information provided by the above source(s), I have updated the patient's home med list as described below.     Removed   omeprazole (PRILOSEC) 20 MG delayed release capsule  cephALEXin (KEFLEX) 250 MG capsule  isosorbide mononitrate (IMDUR) 30 MG extended release tablet  metoprolol succinate (TOPROL XL) 50 MG extended release tablet  Acetaminophen (TYLENOL ARTHRITIS PAIN PO)     Added acetaminophen (ARTHRITIS PAIN RELIEF) 650 MG extended release tablet  metoprolol succinate (TOPROL XL) 100 MG extended release tablet  isosorbide mononitrate (IMDUR) 60 MG extended release tablet     Adjusted   spironolactone (ALDACTONE) 25 MG tablet  atorvastatin (LIPITOR) 20 MG tablet     Other notes:   None    Are any of the medications noted above considered a 'high alert' medication? no      Is the patient on warfarin at home? No          Please feel free to call me with any questions about this encounter. Thank you.      Kate Hutchinson, pharmacy technician  [facility]  Phone:  [phone]      Electronically signed by Kate Hutchinson on 1/7/2025 at 1:32 PM   Note: co-signature by the pharmacist only acknowledges that I have performed a medication review and does not attest to an evaluation of this medication review.      Prior to Admission medications    Medication Sig   acetaminophen (ARTHRITIS PAIN RELIEF) 650 MG extended release tablet Take 1 tablet by mouth every 8 hours as needed for Pain   metoprolol succinate (TOPROL

## 2025-01-07 NOTE — PLAN OF CARE
Problem: Safety - Adult  Goal: Free from fall injury  1/7/2025 1814 by Jose M Hanson RN  Outcome: Progressing  1/7/2025 0451 by Hannah Cabrera RN  Outcome: Progressing     Problem: Chronic Conditions and Co-morbidities  Goal: Patient's chronic conditions and co-morbidity symptoms are monitored and maintained or improved  1/7/2025 1814 by Jose M Hanson RN  Outcome: Progressing  1/7/2025 0451 by Hannah Cabrera RN  Outcome: Progressing  Flowsheets  Taken 1/7/2025 0000 by Hannah Cabrera RN  Care Plan - Patient's Chronic Conditions and Co-Morbidity Symptoms are Monitored and Maintained or Improved:   Monitor and assess patient's chronic conditions and comorbid symptoms for stability, deterioration, or improvement   Collaborate with multidisciplinary team to address chronic and comorbid conditions and prevent exacerbation or deterioration   Update acute care plan with appropriate goals if chronic or comorbid symptoms are exacerbated and prevent overall improvement and discharge  Taken 1/6/2025 2321 by Aline Loja RN  Care Plan - Patient's Chronic Conditions and Co-Morbidity Symptoms are Monitored and Maintained or Improved:   Monitor and assess patient's chronic conditions and comorbid symptoms for stability, deterioration, or improvement   Collaborate with multidisciplinary team to address chronic and comorbid conditions and prevent exacerbation or deterioration     Problem: Discharge Planning  Goal: Discharge to home or other facility with appropriate resources  1/7/2025 1814 by Jose M Hanson RN  Outcome: Progressing  1/7/2025 0451 by Hannah Cabrera RN  Outcome: Progressing  Flowsheets  Taken 1/7/2025 0000 by Hannah Cabrera RN  Discharge to home or other facility with appropriate resources:   Identify barriers to discharge with patient and caregiver   Arrange for needed discharge resources and transportation as appropriate   Identify discharge learning needs (meds, wound care, etc)  Taken 1/6/2025 2321  restraint   Evaluate the patient's condition at the time of restraint application   Inform patient/family regarding the reason for restraint   Every 2 hours: Monitor safety, psychosocial status, comfort, nutrition and hydration

## 2025-01-07 NOTE — ED NOTES
ED to inpatient nurses report     Chief Complaint   Patient presents with    Wound Check     Left leg, pt's wife and pt very poor historians. Wife states she noticed leg swelling \"a few days ago\" and that pt has dementia. Pt states he is not sure what caused wound on left leg. Pt's wife states that he does wander over night and could of possibly burned leg.       Present to ED from home  LOC: alert to only name  Vital signs   Vitals:    01/06/25 1331 01/06/25 1647 01/06/25 1858   BP: 127/87 (!) 161/95 (!) 170/108   Pulse: 100     Resp: 24     Temp: 98.9 °F (37.2 °C)     SpO2: 100%  93%      Oxygen Baseline RA    Current needs required RA   SEPSIS:   [] Lactate X 2 ordered (Yes or No)  [] Antibiotics given (Yes or No)  [] IV Fluids ordered (Yes or No)             [] 2nd IV completed (Yes or No)  [] Hourly Vital Signs (Validated)  [] Outstanding Orders:     LDAs:   Peripheral IV 01/06/25 Left (Active)   Site Assessment Clean, dry & intact 01/06/25 1510   Line Status Brisk blood return;Flushed;Normal saline locked;Specimen collected 01/06/25 1510     Mobility:  Requires assistance   Fall Risk:    Pending ED orders: None   Present condition: Stable   Code Status: Full  Consults: IP CONSULT TO HOSPITALIST  IP CONSULT TO CARDIOLOGY  []  Hospitalist  Completed  [] yes [] no Who:   []  Medicine  Completed  [] yes [] No Who:   []  Cardiology  Completed  [] yes [] No Who:   []  GI   Completed  [] yes [] No Who:   []  Neurology  Completed  [] yes [] No Who:   []  Nephrology Completed  [] yes [] No Who:    []  Vascular  Completed  [] yes [] No Who:   []  Ortho  Completed  [] yes [] No Who:     []  Surgery  Completed  [] yes [] No Who:    []  Urology  Completed  [] yes [] No Who:    []  CT Surgery Completed  [] yes [] No Who:   []  Podiatry  Completed  [] yes [] No Who:    []  Other    Completed  [] yes [] No Who:  Interventions: IV, EKG, labs, lasix,   Important Events: See chart        [] Medication Reconciliation was

## 2025-01-07 NOTE — PROGRESS NOTES
End Of Shift Note  [facility]  Summary of shift: Pt has been calm and cooperative through out day. Denied pain or further needs. Medicated as ordered. Pt has had a sitter at bedside all day.     Vitals:    Vitals:    01/07/25 0518 01/07/25 0600 01/07/25 1503 01/07/25 1724   BP:  127/62  (!) 127/112   Pulse: 73 63     Resp: 18 16     Temp:       TempSrc:       SpO2: 100% 100%     Weight:   91.6 kg (202 lb)    Height:   1.676 m (5' 6\")         I&O:   Intake/Output Summary (Last 24 hours) at 1/7/2025 1831  Last data filed at 1/7/2025 1419  Gross per 24 hour   Intake --   Output 2800 ml   Net -2800 ml       Resp Status: 2L nc    Ventilator Settings:     / / /     Critical Care IV infusions:   sodium chloride      dexmedeTOMIDine (PRECEDEX) 400 mcg in sodium chloride 0.9 % 100 mL infusion Stopped (01/07/25 1032)        LDA:   Peripheral IV 01/06/25 Left (Active)   Number of days: 1       Urinary Catheter 01/07/25 Levine;Coude (Active)   Number of days: 0

## 2025-01-07 NOTE — CONSULTS
History of Present Illness  This is a  89Y  old   Male  with h/o multiple medical problems, including CAD and A flutter, brought to the ED complaining of Lt lower leg pain.  Pt was found to have edematous ankles with erythematous, later ulcers of Lt shin. Pt is confused and provides no history.  Confused history is provided by wife.  No chest pain or dyspnea.  It is not clear if pt has been compliant with home meds.          Patient History  - Coronary artery disease, status post PTCA and stent placement to the CIRC and the proximal DIAG1 performed July 28, 2009. He has multivessel disease documented on a cardiac catheterization performed 9/23/11 which revealed the LMCA to be normal, LAD had mild irregularities, ostial/proximal Diag 1 had stenosis and is a large vessel, Septal 1 had 99% ostial stenosis, Circ had mild irregularities, OM1 was occluded, PL had 95% proximal stenosis and is a small vessel, and RCA had mild irregularities with mild to moderate MR. Persantine myoview stress test performed 5/12/14 revealed findings suspicious for reversible ischemia involving the inferior wall extending into the apex and the lateral wall, and calculated left ventricular ejection fraction of 55%  - Hypercholesterolemia  - Hypertension  - syncope  - history of back surgery  - Diverticulitis  - Chronic sciatic pain  - arthritis, fingers  - GI bleed  - normal LV size and function EF 60%, mildly dilated LA, sclerotic AoV with mild to moderate AI, mild to moderate MR, trivial TR and mild PI documented on a technically difficult echocardiogram performed 12/7/21  - Chronic A flutter.  Pt refused anticoagulation in past.     Social History:  Never smoker  No caffeine intake  No alcohol intake  Somewhat active    Family History:  Family hx of CAD * siblings      Allergies  PENICILLIN  ultram      Medications(home)  --CLOPIDOGREL 75 MG TABLET take 1 tablet by mouth once daily Regan:90 Refill: 2  --METOPROLOL SUCC ER 50 MG TAB take 1  tablet by mouth once daily Regan:90 Refill: 1  --ATORVASTATIN 20 MG TABLET take 1 tablet by mouth once daily Regan:90 Refill: 1  --Aldactone 25 MG Tablet take 1 tablet po Mon,Wed,Fri Regan:39 Refill: 1  --Isosorbide Mononitrate 60 MG Tablet, Extended Release take 1.5 tablets daily Regan:135 Refill: 4  --Tylenol Arthritis 650 MG Tablet, Extended Release 2 po tid  --Omeprazole 20 MG Capsule, Delayed Release TAKE 1 CAPSULE PO QD  --Aspir 81 81 MG Tablet, Enteric Coated QD     Review of Systems  As per HPI       Physical Examination  General:   Confused, appears comfortable.  A flutter  80s,  127/62, 20   Neck:   minimal JVD, no carotid bruits    Lungs:    Clear to auscultation    Cardio:    Irregular rate and rhythm, Tachycardic rate and rhythm, TR murmur, No gallop    Abdomen:    Soft and no hepatojugular reflux    Extremities:   Mild bilateral ankle edema.  Lt erythematous shin ulcers      Reviewed Data  EKG on presentation show A fib,  nonspecific T abnormalities.  BUN/creat 32/1.5  Hgb 10      Impression and Plan    LE ulcers with cellulitis.  2nd to either ischemic arterial disease (emblic from A fib) and or edema.  Will place on bumex.  Check echo.  Check PVR to evaluate ischemic PVD.  Abx as per PMD and critical care. Will obtain BC  CAD  stable  no angina or ischemic EKG changes .  Will restart low dose b blocker.  A fib  will control HR with metoprolol.  Pt and wife had refused chronic anticoagulation in past.

## 2025-01-07 NOTE — ED NOTES
Px found to be standing over sink in room w IV removed. Px states that he is okay but has made a mess. Px also notes that he believes he is at a [employer]. Decision made to move px in view of nurses station.

## 2025-01-07 NOTE — PROGRESS NOTES
Pt admitted to room 2022 from ED attempting to jump out of bed, pushing   RN. House supervisor called to bedside, patient still attempting to jump out of bed and started being combative towards house supervisor. Seroquel ordered per Mp.

## 2025-01-07 NOTE — PROGRESS NOTES
End Of Shift Note  [facility] [facility]  Summary of shift: Pt transferred to ICU to start precedex gtt. Upon arrival pt kicking, confused, trying to get out of bed. Precedex started. 1:1 at bedside. Restraints were needed but discontinued this morning. Pt unable to urinate and in a lot of pain with abd distention. 919 mls bladder scanned. Walker put in per Doctors orders and 1,025 mls emptied this shift. Pt immediately calmed down, VS stabilized, and went right to sleep after walker inserted. Precedex 0.6. 1:1 sitter continued.    Vitals:    Vitals:    01/07/25 0400 01/07/25 0500 01/07/25 0518 01/07/25 0600   BP: 108/67 111/78  127/62   Pulse: 78 78 73 63   Resp: 25 16 18 16   Temp: 97.7 °F (36.5 °C)      TempSrc: Axillary      SpO2: 97% 99% 100% 100%   Weight:       Height:            I&O:   Intake/Output Summary (Last 24 hours) at 1/7/2025 0643  Last data filed at 1/7/2025 0557  Gross per 24 hour   Intake --   Output 1725 ml   Net -1725 ml       Resp Status: 2LNC    Ventilator Settings:     / / /     Critical Care IV infusions:   sodium chloride      dexmedeTOMIDine (PRECEDEX) 400 mcg in sodium chloride 0.9 % 100 mL infusion 0.6 mcg/kg/hr (01/07/25 0613)        LDA:   Peripheral IV 01/06/25 Left (Active)   Number of days: 0       Urinary Catheter 01/07/25 Walker;Coude (Active)   Number of days: 0

## 2025-01-07 NOTE — H&P
New onset heart failure  Chronic wound left mid anterior leg  Dementia psychosis  Detail  89-year-old morbidly obese  male who is extremely hard of hearing and aggressive personality was presented to the emergency room brought in by his wife with complaint of wound left leg.  No apparent history of trauma.  Information obtained from his wife.  She denies history of chest pain shortness of breath palpitation diaphoresis nausea vomiting or urogenital symptoms.  Through the night he was very agitated with sundowning.  He was lunging at the nurses that necessitated placing him on Precedex.  He did not respond to Haldol and he declined Seroquel  Currently he is wide-awake and answer question appropriately  Past medical history coronary artery disease  Hypertension  CKD  History of A-fib  Hyperlipidemia  Degenerative polyarthralgia  Morbid obese  Sleep apnea  Past surgical history next PCI and stent  Medications per list reviewed  Allergies per list reviewed  Social history.  He lives with his wife.  He has not been seen in my office in a few years.  He denies tobacco, alcohol or illicit drug use  Family history  Review of system all 12 system reviewed.  History of present illness  Vitals  Afebrile hemodynamically stable  Systemic exam  HEENT normocephalic atraumatic, PERRLA, no conjunctival injection, bilateral pupil 3 mm each noted likely retraction or nystagmus  Oral examination is unremarkable moist and pink mucosa.  Soft palate is unremarkable.  Uvula central.  Gag reflex and oral airway is intact  Neck  Anatomical lordotic curvature, trachea central no JVD or carotid bruit  Lungs distant breath sounds secondary to morbid body habitus.  No accessory muscle use witnessed  CVS S1-S2 regular rate and rhythm  Abdomen morbidly obese soft scaphoid benign to palpation normoactive bowel sound  CNS.  History of dementia with significant anxiety and agitation.  Cranial nerves II through XII grossly intact.  Reflexes

## 2025-01-08 ENCOUNTER — APPOINTMENT (OUTPATIENT)
Dept: VASCULAR LAB | Age: 89
DRG: 291 | End: 2025-01-08
Attending: FAMILY MEDICINE
Payer: MEDICARE

## 2025-01-08 PROBLEM — F03.B0 MODERATE DEMENTIA WITHOUT BEHAVIORAL DISTURBANCE, PSYCHOTIC DISTURBANCE, MOOD DISTURBANCE, OR ANXIETY (HCC): Status: ACTIVE | Noted: 2025-01-08

## 2025-01-08 PROBLEM — G93.41 ACUTE METABOLIC ENCEPHALOPATHY: Status: ACTIVE | Noted: 2025-01-08

## 2025-01-08 LAB
ALBUMIN SERPL-MCNC: 3.3 G/DL (ref 3.5–5.2)
ALBUMIN/GLOB SERPL: 1.3 {RATIO} (ref 1–2.5)
ALP SERPL-CCNC: 84 U/L (ref 40–129)
ALT SERPL-CCNC: 19 U/L (ref 10–50)
AMMONIA PLAS-SCNC: 11 UMOL/L (ref 16–60)
ANION GAP SERPL CALCULATED.3IONS-SCNC: 14 MMOL/L (ref 9–16)
AST SERPL-CCNC: 36 U/L (ref 10–50)
BILIRUB SERPL-MCNC: 0.9 MG/DL (ref 0–1.2)
BUN SERPL-MCNC: 35 MG/DL (ref 8–23)
CALCIUM SERPL-MCNC: 9.2 MG/DL (ref 8.8–10.2)
CHLORIDE SERPL-SCNC: 107 MMOL/L (ref 98–107)
CO2 SERPL-SCNC: 25 MMOL/L (ref 20–31)
CREAT SERPL-MCNC: 1.5 MG/DL (ref 0.7–1.2)
ECHO BSA: 2.07 M2
ERYTHROCYTE [DISTWIDTH] IN BLOOD BY AUTOMATED COUNT: 16.7 % (ref 11.8–14.4)
FOLATE SERPL-MCNC: 8.4 NG/ML (ref 4.8–24.2)
GFR, ESTIMATED: 43 ML/MIN/1.73M2
GLUCOSE SERPL-MCNC: 92 MG/DL (ref 82–115)
HCT VFR BLD AUTO: 36.9 % (ref 40.7–50.3)
HGB BLD-MCNC: 11.5 G/DL (ref 13–17)
MAGNESIUM SERPL-MCNC: 1.4 MG/DL (ref 1.6–2.4)
MCH RBC QN AUTO: 27.2 PG (ref 25.2–33.5)
MCHC RBC AUTO-ENTMCNC: 31.2 G/DL (ref 28.4–34.8)
MCV RBC AUTO: 87.2 FL (ref 82.6–102.9)
NRBC BLD-RTO: 0 PER 100 WBC
PLATELET # BLD AUTO: 210 K/UL (ref 138–453)
PMV BLD AUTO: 9.1 FL (ref 8.1–13.5)
POTASSIUM SERPL-SCNC: 4.1 MMOL/L (ref 3.7–5.3)
PROT SERPL-MCNC: 5.8 G/DL (ref 6.6–8.7)
RBC # BLD AUTO: 4.23 M/UL (ref 4.21–5.77)
SODIUM SERPL-SCNC: 145 MMOL/L (ref 136–145)
TSH SERPL DL<=0.05 MIU/L-ACNC: 2.39 UIU/ML (ref 0.27–4.2)
VIT B12 SERPL-MCNC: 1532 PG/ML (ref 232–1245)
WBC OTHER # BLD: 11.1 K/UL (ref 3.5–11.3)

## 2025-01-08 PROCEDURE — 99213 OFFICE O/P EST LOW 20 MIN: CPT

## 2025-01-08 PROCEDURE — 82746 ASSAY OF FOLIC ACID SERUM: CPT

## 2025-01-08 PROCEDURE — 2000000000 HC ICU R&B

## 2025-01-08 PROCEDURE — 2580000003 HC RX 258: Performed by: INTERNAL MEDICINE

## 2025-01-08 PROCEDURE — 87070 CULTURE OTHR SPECIMN AEROBIC: CPT

## 2025-01-08 PROCEDURE — 84443 ASSAY THYROID STIM HORMONE: CPT

## 2025-01-08 PROCEDURE — 99223 1ST HOSP IP/OBS HIGH 75: CPT | Performed by: PSYCHIATRY & NEUROLOGY

## 2025-01-08 PROCEDURE — 82140 ASSAY OF AMMONIA: CPT

## 2025-01-08 PROCEDURE — 80053 COMPREHEN METABOLIC PANEL: CPT

## 2025-01-08 PROCEDURE — C1751 CATH, INF, PER/CENT/MIDLINE: HCPCS

## 2025-01-08 PROCEDURE — 6370000000 HC RX 637 (ALT 250 FOR IP): Performed by: PSYCHIATRY & NEUROLOGY

## 2025-01-08 PROCEDURE — 83735 ASSAY OF MAGNESIUM: CPT

## 2025-01-08 PROCEDURE — 85027 COMPLETE CBC AUTOMATED: CPT

## 2025-01-08 PROCEDURE — 82607 VITAMIN B-12: CPT

## 2025-01-08 PROCEDURE — 2500000003 HC RX 250 WO HCPCS: Performed by: FAMILY MEDICINE

## 2025-01-08 PROCEDURE — 6370000000 HC RX 637 (ALT 250 FOR IP): Performed by: NURSE PRACTITIONER

## 2025-01-08 PROCEDURE — 6360000002 HC RX W HCPCS: Performed by: INTERNAL MEDICINE

## 2025-01-08 PROCEDURE — 94761 N-INVAS EAR/PLS OXIMETRY MLT: CPT

## 2025-01-08 PROCEDURE — 93970 EXTREMITY STUDY: CPT

## 2025-01-08 PROCEDURE — 36415 COLL VENOUS BLD VENIPUNCTURE: CPT

## 2025-01-08 PROCEDURE — 99233 SBSQ HOSP IP/OBS HIGH 50: CPT | Performed by: FAMILY MEDICINE

## 2025-01-08 PROCEDURE — 6360000002 HC RX W HCPCS: Performed by: FAMILY MEDICINE

## 2025-01-08 PROCEDURE — 87205 SMEAR GRAM STAIN: CPT

## 2025-01-08 PROCEDURE — 2580000003 HC RX 258: Performed by: FAMILY MEDICINE

## 2025-01-08 PROCEDURE — 93970 EXTREMITY STUDY: CPT | Performed by: SURGERY

## 2025-01-08 PROCEDURE — 6370000000 HC RX 637 (ALT 250 FOR IP): Performed by: FAMILY MEDICINE

## 2025-01-08 PROCEDURE — 05HB33Z INSERTION OF INFUSION DEVICE INTO RIGHT BASILIC VEIN, PERCUTANEOUS APPROACH: ICD-10-PCS | Performed by: FAMILY MEDICINE

## 2025-01-08 PROCEDURE — 76937 US GUIDE VASCULAR ACCESS: CPT

## 2025-01-08 RX ORDER — QUETIAPINE FUMARATE 25 MG/1
25 TABLET, FILM COATED ORAL DAILY
Status: DISCONTINUED | OUTPATIENT
Start: 2025-01-09 | End: 2025-01-12

## 2025-01-08 RX ORDER — QUETIAPINE FUMARATE 25 MG/1
50 TABLET, FILM COATED ORAL NIGHTLY
Status: DISCONTINUED | OUTPATIENT
Start: 2025-01-08 | End: 2025-01-12

## 2025-01-08 RX ORDER — CLOPIDOGREL BISULFATE 75 MG/1
75 TABLET ORAL DAILY
Status: DISCONTINUED | OUTPATIENT
Start: 2025-01-08 | End: 2025-01-15 | Stop reason: HOSPADM

## 2025-01-08 RX ORDER — DEXTROSE MONOHYDRATE 50 MG/ML
INJECTION, SOLUTION INTRAVENOUS CONTINUOUS
Status: DISCONTINUED | OUTPATIENT
Start: 2025-01-08 | End: 2025-01-09

## 2025-01-08 RX ORDER — LIDOCAINE HYDROCHLORIDE 10 MG/ML
50 INJECTION, SOLUTION EPIDURAL; INFILTRATION; INTRACAUDAL; PERINEURAL ONCE
Status: COMPLETED | OUTPATIENT
Start: 2025-01-08 | End: 2025-01-08

## 2025-01-08 RX ORDER — SODIUM CHLORIDE 9 MG/ML
INJECTION, SOLUTION INTRAVENOUS PRN
Status: DISCONTINUED | OUTPATIENT
Start: 2025-01-08 | End: 2025-01-15 | Stop reason: HOSPADM

## 2025-01-08 RX ORDER — SODIUM CHLORIDE 0.9 % (FLUSH) 0.9 %
5-40 SYRINGE (ML) INJECTION PRN
Status: DISCONTINUED | OUTPATIENT
Start: 2025-01-08 | End: 2025-01-15 | Stop reason: HOSPADM

## 2025-01-08 RX ORDER — CEPHALEXIN 500 MG/1
500 CAPSULE ORAL EVERY 6 HOURS SCHEDULED
Status: DISCONTINUED | OUTPATIENT
Start: 2025-01-08 | End: 2025-01-08

## 2025-01-08 RX ORDER — METOPROLOL TARTRATE 25 MG/1
25 TABLET, FILM COATED ORAL 2 TIMES DAILY
Status: DISCONTINUED | OUTPATIENT
Start: 2025-01-08 | End: 2025-01-09

## 2025-01-08 RX ORDER — SODIUM CHLORIDE 0.9 % (FLUSH) 0.9 %
5-40 SYRINGE (ML) INJECTION EVERY 12 HOURS SCHEDULED
Status: DISCONTINUED | OUTPATIENT
Start: 2025-01-08 | End: 2025-01-15 | Stop reason: HOSPADM

## 2025-01-08 RX ORDER — ASPIRIN 81 MG/1
81 TABLET ORAL DAILY
Status: DISCONTINUED | OUTPATIENT
Start: 2025-01-08 | End: 2025-01-13

## 2025-01-08 RX ADMIN — LIDOCAINE HYDROCHLORIDE 50 MG: 10 INJECTION, SOLUTION EPIDURAL; INFILTRATION; INTRACAUDAL; PERINEURAL at 14:39

## 2025-01-08 RX ADMIN — ONDANSETRON 4 MG: 2 INJECTION INTRAMUSCULAR; INTRAVENOUS at 03:56

## 2025-01-08 RX ADMIN — METOPROLOL TARTRATE 25 MG: 25 TABLET, FILM COATED ORAL at 17:52

## 2025-01-08 RX ADMIN — MICONAZOLE NITRATE: 20 CREAM TOPICAL at 08:44

## 2025-01-08 RX ADMIN — CEFAZOLIN 1000 MG: 1 INJECTION, POWDER, FOR SOLUTION INTRAMUSCULAR; INTRAVENOUS at 22:21

## 2025-01-08 RX ADMIN — SODIUM CHLORIDE, PRESERVATIVE FREE 10 ML: 5 INJECTION INTRAVENOUS at 08:44

## 2025-01-08 RX ADMIN — BUMETANIDE 1 MG: 0.25 INJECTION INTRAMUSCULAR; INTRAVENOUS at 09:29

## 2025-01-08 RX ADMIN — DEXMEDETOMIDINE 0.2 MCG/KG/HR: 100 INJECTION, SOLUTION INTRAVENOUS at 03:37

## 2025-01-08 RX ADMIN — SODIUM CHLORIDE, PRESERVATIVE FREE 10 ML: 5 INJECTION INTRAVENOUS at 22:23

## 2025-01-08 RX ADMIN — CLOPIDOGREL BISULFATE 75 MG: 75 TABLET ORAL at 17:35

## 2025-01-08 RX ADMIN — ASPIRIN 81 MG: 81 TABLET, COATED ORAL at 17:36

## 2025-01-08 RX ADMIN — CEFAZOLIN 1000 MG: 1 INJECTION, POWDER, FOR SOLUTION INTRAMUSCULAR; INTRAVENOUS at 14:31

## 2025-01-08 RX ADMIN — MICONAZOLE NITRATE: 20 CREAM TOPICAL at 22:22

## 2025-01-08 RX ADMIN — DEXTROSE MONOHYDRATE: 50 INJECTION, SOLUTION INTRAVENOUS at 08:42

## 2025-01-08 RX ADMIN — HALOPERIDOL LACTATE 6 MG: 5 INJECTION, SOLUTION INTRAMUSCULAR at 03:51

## 2025-01-08 RX ADMIN — ENOXAPARIN SODIUM 40 MG: 100 INJECTION SUBCUTANEOUS at 09:28

## 2025-01-08 ASSESSMENT — PAIN SCALES - GENERAL
PAINLEVEL_OUTOF10: 0

## 2025-01-08 ASSESSMENT — PAIN SCALES - PAIN ASSESSMENT IN ADVANCED DEMENTIA (PAINAD)
BREATHING: NORMAL
FACIALEXPRESSION: SMILING OR INEXPRESSIVE
TOTALSCORE: 0
BODYLANGUAGE: RELAXED
CONSOLABILITY: NO NEED TO CONSOLE
TOTALSCORE: 0
FACIALEXPRESSION: SMILING OR INEXPRESSIVE
TOTALSCORE: 0
BREATHING: NORMAL
CONSOLABILITY: NO NEED TO CONSOLE
BODYLANGUAGE: RELAXED
FACIALEXPRESSION: SMILING OR INEXPRESSIVE
BODYLANGUAGE: RELAXED
CONSOLABILITY: NO NEED TO CONSOLE
BREATHING: NORMAL

## 2025-01-08 NOTE — CONSULTS
Pulmonary Medicine and Critical Care Consult    Patient - Jose Robison   MRN -  [id number]   [facility] # - [id number]   - 1935      Date of Admission -  2025  2:34 PM  Date of evaluation -  2025  Room - [address] [place] [address]   Hospital Day - 2  Consulting - Sepideh Gresham MD Primary Care Physician - Sepideh Gresham MD     Reason for Consult      ICU management/respiratory insufficiency  Assessment/recommendations   Acute hypoxic respiratory insufficiency/ pulmonary edema   Oxygen by nasal cannula  Incentive spirometry every hour while awake  Chest x-ray in AM   On Bumex IV     Metabolic encephalopathy/agitation/possible  Hold off sedatives for now monitor mental status  N.p.o. till more awake  Neurology consulted  Precedex for agitation as needed  Keep seroquel 25 QAM ; make 50 QHS     Left lower extreme cellulitis possible burn/PVD  Cefazolin IV  Check blood culture  Wound care  Vascular evaluation    A-fib flutter/CAD status post PCI  Cardiology on consult patient declined anticoagulation  Beta-blocker/ Plavix    CASSIA   Bumex 1 mg IVD  monitor creatinine    Obstructive sleep apnea   Outpatient sleep evaluation    discussed with RN and clinical pharmacist  Will likely need placement  Peptic ulcer disease prophylaxis  DVT prophylaxis    Problem List      Patient Active Problem List   Diagnosis    Hyperlipidemia    HTN (hypertension)    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    Coronary artery disease involving native coronary artery of native heart without angina pectoris    History of craniotomy    H/O intracranial hemorrhage    Hogan's esophagus    Cervical discogenic pain syndrome    Spinal stenosis of lumbar region with neurogenic claudication    Mood disorder (HCC)    Lumbar  Highest education level: Not on file   Occupational History    Not on file   Tobacco Use    Smoking status: Never    Smokeless tobacco: Never   Substance and Sexual Activity    Alcohol use: No    Drug use: No    Sexual activity: Never   Other Topics Concern    Not on file   Social History Narrative    Not on file     Social Determinants of Health     Financial Resource Strain: Low Risk  (7/8/2022)    Overall Financial Resource Strain (CARDIA)     Difficulty of Paying Living Expenses: Not hard at all   Food Insecurity: No Food Insecurity (7/8/2022)    Hunger Vital Sign     Worried About Running Out of Food in the Last Year: Never true     Ran Out of Food in the Last Year: Never true   Transportation Needs: Not on file   Physical Activity: Not on file   Stress: Not on file   Social Connections: Not on file   Intimate Partner Violence: Not on file   Housing Stability: Not on file     Family History    No family history on file.  ROS - 11 systems   Attempted not possible due to mental status  Vitals     height is 1.676 m (5' 6\") and weight is 91.6 kg (202 lb). His axillary temperature is 97.6 °F (36.4 °C). His blood pressure is 129/71 and his pulse is 95. His respiration is 13 and oxygen saturation is 100%.   Body mass index is 32.6 kg/m².  I/O      Intake/Output Summary (Last 24 hours) at 1/8/2025 1336  Last data filed at 1/8/2025 1200  Gross per 24 hour   Intake 134.93 ml   Output 3700 ml   Net -3565.07 ml     I/O last 3 completed shifts:  In: 134.9 [I.V.:134.9]  Out: 4825 [Urine:4825]   Patient Vitals for the past 96 hrs (Last 3 readings):   Weight   01/07/25 1503 91.6 kg (202 lb)   01/07/25 0000 91.7 kg (202 lb 2.6 oz)   01/06/25 2015 99.8 kg (220 lb)     Exam   General Appearance lethargic unresponsive  HEENT - Head is normocephalic, atraumatic. Pupil reactive to light  Neck - Supple, symmetrical, trachea midline and Soft, trachea midline and straight  Lungs -decreased breath sound no crackles or

## 2025-01-08 NOTE — PROCEDURES
PROCEDURE NOTE  Date: 1/8/2025   Name: Jose Robison  YOB: 1935    Procedures      Midline insertion note:     Prescribed therapy: Limited access  Product type: Arrow non tapered Midline  Ultrasound pre assessment done. Insertion site: right basilic vein   History/Labs/Allergies Reviewed  Placed By: Kushal Black RN IV Team  Assistant: Pastora ABBOTT  Time out Performed using Two Identifiers    Catheter size: 4 Surinamese  Power injectable: Yes  Total length: 15 cm  External catheter length: 1 cm  Extremity circumference at insertion site: 30 cm  Number of attempts: 1  Estimated blood loss: 1 ml  Placement verified by: US guidance, positive blood return & flushes easily  Special equipment used: ultrasound & micro-introducer technique   Catheter securement: Adhesive securement device  Catheter securement adhesive (SecureportIV) utilized at insertion site  Dressing applied: Tegaderm CHG  Lidocaine administered intradermally conc.1%: approx 1 mL    Midline education:     [ X] Post care line insertion was discussed with patient/Family or POA prior to procedure.  Risks, benefits, alternatives, and reason for procedure were discussed and teaching was reinforced. An educational handout on post insertion line care and maintenance was left at bedside with patient or in chart. Patient (Family or POA) acknowledged understanding of information relayed.       *** vessel picture

## 2025-01-08 NOTE — PLAN OF CARE
Problem: Safety - Adult  Goal: Free from fall injury  1/8/2025 1800 by Jose M Hanson RN  Outcome: Progressing  1/8/2025 0511 by Hannah Cabrera RN  Outcome: Progressing     Problem: Chronic Conditions and Co-morbidities  Goal: Patient's chronic conditions and co-morbidity symptoms are monitored and maintained or improved  1/8/2025 1800 by Jose M Hanson RN  Outcome: Progressing  Flowsheets  Taken 1/8/2025 1300  Care Plan - Patient's Chronic Conditions and Co-Morbidity Symptoms are Monitored and Maintained or Improved: Monitor and assess patient's chronic conditions and comorbid symptoms for stability, deterioration, or improvement  Taken 1/8/2025 0800  Care Plan - Patient's Chronic Conditions and Co-Morbidity Symptoms are Monitored and Maintained or Improved: Monitor and assess patient's chronic conditions and comorbid symptoms for stability, deterioration, or improvement  1/8/2025 0511 by Hannah Cabrera RN  Outcome: Not Progressing  Flowsheets (Taken 1/7/2025 2000)  Care Plan - Patient's Chronic Conditions and Co-Morbidity Symptoms are Monitored and Maintained or Improved:   Monitor and assess patient's chronic conditions and comorbid symptoms for stability, deterioration, or improvement   Collaborate with multidisciplinary team to address chronic and comorbid conditions and prevent exacerbation or deterioration   Update acute care plan with appropriate goals if chronic or comorbid symptoms are exacerbated and prevent overall improvement and discharge     Problem: Discharge Planning  Goal: Discharge to home or other facility with appropriate resources  1/8/2025 1800 by Jose M Hanson RN  Outcome: Progressing  Flowsheets  Taken 1/8/2025 1300  Discharge to home or other facility with appropriate resources: Identify barriers to discharge with patient and caregiver  Taken 1/8/2025 0800  Discharge to home or other facility with appropriate resources: Identify barriers to discharge with patient and  caregiver  1/8/2025 0511 by Hannah Cabrera RN  Outcome: Progressing  Flowsheets (Taken 1/7/2025 2000)  Discharge to home or other facility with appropriate resources:   Identify barriers to discharge with patient and caregiver   Arrange for needed discharge resources and transportation as appropriate   Identify discharge learning needs (meds, wound care, etc)     Problem: Pain  Goal: Verbalizes/displays adequate comfort level or baseline comfort level  1/8/2025 1800 by Jose M Hanson RN  Outcome: Progressing  1/8/2025 0511 by Hannah Cabrera RN  Outcome: Progressing  Flowsheets  Taken 1/8/2025 0400  Verbalizes/displays adequate comfort level or baseline comfort level: Assess pain using appropriate pain scale  Taken 1/8/2025 0000  Verbalizes/displays adequate comfort level or baseline comfort level: Assess pain using appropriate pain scale  Taken 1/7/2025 2000  Verbalizes/displays adequate comfort level or baseline comfort level: Assess pain using appropriate pain scale     Problem: Safety - Medical Restraint  Goal: Remains free of injury from restraints (Restraint for Interference with Medical Device)  Description: INTERVENTIONS:  1. Determine that other, less restrictive measures have been tried or would not be effective before applying the restraint  2. Evaluate the patient's condition at the time of restraint application  3. Inform patient/family regarding the reason for restraint  4. Q2H: Monitor safety, psychosocial status, comfort, nutrition and hydration  1/8/2025 1800 by Jose M Hanson RN  Outcome: Progressing  Flowsheets  Taken 1/8/2025 1200  Remains free of injury from restraints (restraint for interference with medical device): Determine that other, less restrictive measures have been tried or would not be effective before applying the restraint  Taken 1/8/2025 1000  Remains free of injury from restraints (restraint for interference with medical device): Determine that other, less restrictive measures

## 2025-01-08 NOTE — PROGRESS NOTES
Progress Note    Subjective:  Follow-up on acute hypoxic respiratory failure, COPD exacerbation  Acute on chronic renal failure with prerenal azotemia  Superficial cellulitis left leg    The patient is awake and alert.  No problems overnight.  Denies chest pain, angina, and dyspnea.  Denies abdominal pain.  Tolerating diet.  No nausea or vomiting.    Admit Date:  1/6/2025    Patient Seen, Chart, Labs, Radiology studies, and Consults reviewed.    Objective:   /71   Pulse 95   Temp 97.6 °F (36.4 °C) (Axillary)   Resp 13   Ht 1.676 m (5' 6\")   Wt 91.6 kg (202 lb)   SpO2 100%   BMI 32.60 kg/m²     Intake/Output Summary (Last 24 hours) at 1/8/2025 1243  Last data filed at 1/8/2025 1200  Gross per 24 hour   Intake 134.93 ml   Output 3700 ml   Net -3565.07 ml     General appearance - alert, well appearing, and in no distress and oriented to person, place, and time  Heart:  RRR, no murmurs, gallops, or rubs, extrasystoles.  Lungs:  CTA bilaterally, no wheeze, rales or rhonchi, good air entry, good respiratory effort  Abd: bowel sounds present, nontender, nondistended, no masses, no rigidity, no guarding, no peritoneal signs.  Integumentary: Skin good color, good turgor, no rashes, no acute lesions  Upper Extrem:  No clubbing bilateral hands, no cyanosis or edema  Lower Extrem:no cyanosis or edema, no calf tenderness to lower extremities  Neurological - alert, oriented, normal speech, no focal findings or movement disorder noted, neck supple without rigidity, motor and sensory grossly normal bilaterally      Medications:    cephALEXin  500 mg Oral 4 times per day    metoprolol tartrate  25 mg Oral BID    bumetanide  1 mg IntraVENous Daily    morphine  4 mg IntraVENous Once    miconazole   Topical BID    sodium chloride flush  5-40 mL IntraVENous 2 times per day    enoxaparin  40 mg SubCUTAneous Daily    QUEtiapine  25 mg Oral Nightly       Data:  CBC:   Recent Labs     01/06/25  1510 01/07/25  0422  01/08/25  0515   WBC 8.7 9.5 11.1   RBC 4.21 3.68* 4.23   HGB 11.4* 10.0* 11.5*   HCT 36.9* 32.4* 36.9*   MCV 87.6 88.0 87.2   RDW 16.6* 16.4* 16.7*    195 210     BMP:   Recent Labs     01/06/25  1510 01/06/25  1830 01/07/25  0422 01/08/25  0515     --  142 145   K 5.5* 4.3 4.1 4.1     --  108* 107   CO2 19*  --  23 25   BUN 30*  --  32* 35*   CREATININE 1.4*  --  1.5* 1.5*     BNP: No results for input(s): \"BNP\" in the last 72 hours.  PT/INR: No results for input(s): \"PROTIME\", \"INR\" in the last 72 hours.  APTT: No results for input(s): \"APTT\" in the last 72 hours.  CARDIAC ENZYMES: No results for input(s): \"CKMB\", \"CKMBINDEX\", \"TROPONINI\" in the last 72 hours.    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 *  2D echo shows preserved ejection fraction, moderate AR TR, MR.  Cardiology on board  Acute on chronic renal failure with prerenal azotemia.  Will add low-flow D5W  Dementia with psychosis.  Sundowning.  He needs Precedex at night.  Continue Seroquel.  Neurology on the consult  Superficial cellulitis that appears to be posttraumatic.  Continue wound care, Bactroban and p.o..  DEANGELO and venous duplex unremarkable  DVT/GI prophylaxis  PT/OT  Discharge planning.  Is a difficult situation.  Patient is not used to staying outside and he does not like to go anywhere.  My concern is that if he gets psychotic at night he will have the same situation in SNF.  I think it is in his best interest for him to go home with home health care.  This patient is extremely hard of hearing.  He understands his  wife  Plan of care discussed with RN        Sepideh Gresham MD, MD 1/8/2025 12:43 PM

## 2025-01-08 NOTE — PROGRESS NOTES
Subjective: Combative overnight requiring Precedex drip and Haldol. Precedex has been off since late this AM. No reports of chest pain or dyspnea. Has not had oral medications due lethargy.     VS: /71   Pulse 95   Temp 97.6 °F (36.4 °C) (Axillary)   Resp 13   Ht 1.676 m (5' 6\")   Wt 91.6 kg (202 lb)   SpO2 100%   BMI 32.60 kg/m²     Wt:     I/O: In: 134.9 [I.V.:134.9]  Out: 4175 [Urine:4175]    Monitor: Atrial flutter with Vrate 80's-90's, 7 beat NSVT    Meds: Scheduled Meds:   metoprolol tartrate  25 mg Oral BID    ceFAZolin  1,000 mg IntraVENous Q8H    sodium chloride flush  5-40 mL IntraVENous 2 times per day    lidocaine 1 % injection  50 mg IntraDERmal Once    bumetanide  1 mg IntraVENous Daily    morphine  4 mg IntraVENous Once    miconazole   Topical BID    sodium chloride flush  5-40 mL IntraVENous 2 times per day    enoxaparin  40 mg SubCUTAneous Daily    QUEtiapine  25 mg Oral Nightly     Continuous Infusions:   dextrose 50 mL/hr at 01/08/25 0842    sodium chloride      sodium chloride      dexmedeTOMIDine (PRECEDEX) 400 mcg in sodium chloride 0.9 % 100 mL infusion Stopped (01/08/25 1039)     PRN Meds:.sodium chloride flush, sodium chloride, sodium chloride flush, sodium chloride, acetaminophen, ondansetron **OR** ondansetron, haloperidol lactate     Exam:General Appearance: Sleeping, NAD  Skin: warm, dry  Pulmonary/Chest: CTA anteriorly  Cardiovascular: irregular, negative JVD  Extremities: Mild peripheral edema, LLE wrapped    Labs:     CBC with Differential:    Lab Results   Component Value Date/Time    WBC 11.1 01/08/2025 05:15 AM    RBC 4.23 01/08/2025 05:15 AM    HGB 11.5 01/08/2025 05:15 AM    HCT 36.9 01/08/2025 05:15 AM     01/08/2025 05:15 AM     09/23/2011 07:51 AM    MCV 87.2 01/08/2025 05:15 AM    MCH 27.2 01/08/2025 05:15 AM    MCHC 31.2 01/08/2025 05:15 AM    RDW 16.7 01/08/2025 05:15 AM    LYMPHOPCT 14 01/06/2025 03:10 PM    MONOPCT 11 01/06/2025 03:10 PM     EOSPCT 1 01/06/2025 03:10 PM    BASOPCT 1 01/06/2025 03:10 PM    MONOSABS 0.97 01/06/2025 03:10 PM    LYMPHSABS 1.17 01/06/2025 03:10 PM    EOSABS 0.12 01/06/2025 03:10 PM    BASOSABS 0.05 01/06/2025 03:10 PM    DIFFTYPE NOT REPORTED 12/14/2021 07:52 AM       CMP:    Lab Results   Component Value Date/Time     01/08/2025 05:15 AM    K 4.1 01/08/2025 05:15 AM     01/08/2025 05:15 AM    CO2 25 01/08/2025 05:15 AM    BUN 35 01/08/2025 05:15 AM    CREATININE 1.5 01/08/2025 05:15 AM    GFRAA 59 12/14/2021 07:52 AM    LABGLOM 43 01/08/2025 05:15 AM    LABGLOM 49 10/18/2023 09:12 AM    GLUCOSE 92 01/08/2025 05:15 AM    CALCIUM 9.2 01/08/2025 05:15 AM    BILITOT 0.9 01/08/2025 05:15 AM    ALKPHOS 84 01/08/2025 05:15 AM    AST 36 01/08/2025 05:15 AM    ALT 19 01/08/2025 05:15 AM         A/P: 1. CAD  -No angina. On B. Blocker. On ASA, Plavix, Nitrate, and Statin PTA.     2. HTN  -BP controlled.     3. Hypercholesterolemia  -On Statin PTA.     4. Chronic atrial flutter  -Vrate controlled. Patient has declined anticoagulation.     5. CHF  -Acute LV diastolic dysfunction. On Bumex 1mg QD. In negative balance.     6. Moderate AI, moderate MR, mild to moderate TR  -Medical therapy.     7. NSVT  -Check Mg.     Will discuss with Dr. Jenkins    Electronically signed by PAMELA Hannah - CNP on 1/8/2025 at 2:10 PM

## 2025-01-08 NOTE — CONSULTS
[facility]   IN-PATIENT SERVICE      NEUROLOGY CONSULT  NOTE            Date:   1/8/2025  Patient name:  Jose Robison  Date of admission:  1/6/2025  YOB: 1935      Chief Complaint:     Chief Complaint   Patient presents with    Wound Check     Left leg, pt's wife and pt very poor historians. Wife states she noticed leg swelling \"a few days ago\" and that pt has dementia. Pt states he is not sure what caused wound on left leg. Pt's wife states that he does wander over night and could of possibly burned leg.        Reason for Consult:      Altered mental status    History of Present Illness:     The patient is a 89 y.o. male who presents with Wound Check (Left leg, pt's wife and pt very poor historians. Wife states she noticed leg swelling \"a few days ago\" and that pt has dementia. Pt states he is not sure what caused wound on left leg. Pt's wife states that he does wander over night and could of possibly burned leg. )  . The patient was seen and examined and the chart was reviewed.  Patient initially presenting to the hospital due to what was described as a wound on the left lower extremity on 1/6.  Patient resides at home with wife.  Reportedly has history of dementia and is a poor historian.  During his hospitalization he has had significant delirium, sundowning at night requiring antipsychotics and even Precedex infusion.  Currently in the medical ICU being treated with Precedex which actually has been weaned off over the last 24 hours.  During the day today has been fairly calm and somnolent as per nursing report.  Continues to have a one-to-one sitter.  Patient is very hard of hearing as is his wife reportedly.  He has history of a flutter/A-fib not compliant on anticoagulation.  Currently being treated for left lower extremity cellulitis as well as acute delirium.    At this time patient is awake alert to self.  Able to follow commands with multiple prompts.  He is very hard of hearing.

## 2025-01-08 NOTE — PROGRESS NOTES
End Of Shift Note  [facility]  Summary of shift: Pt has been calm and cooperative through out shift. Lethargic most of the day precedex shut off approx 10:30. Pt received a midline for infusions no signs of infiltration or adverse reactions. 1 on 1 sitter continues due to aggressive behaviors and difficulty to redirect.    Vitals:    Vitals:    01/08/25 1600 01/08/25 1700 01/08/25 1749 01/08/25 1800   BP: (!) 141/74 (!) 161/85 (!) 161/85 (!) 161/109   Pulse: (!) 115 (!) 108 (!) 109 (!) 109   Resp: 26 25 26   Temp: 97.6 °F (36.4 °C)      TempSrc: Axillary      SpO2: 94% 100%  93%   Weight:       Height:            I&O:   Intake/Output Summary (Last 24 hours) at 1/8/2025 1844  Last data filed at 1/8/2025 1616  Gross per 24 hour   Intake --   Output 3825 ml   Net -3825 ml       Resp Status: 2L nc    Ventilator Settings:     / / /     Critical Care IV infusions:   dextrose 50 mL/hr at 01/08/25 0842    sodium chloride      sodium chloride      dexmedeTOMIDine (PRECEDEX) 400 mcg in sodium chloride 0.9 % 100 mL infusion Stopped (01/08/25 1039)        LDA:   Peripheral IV 01/06/25 Left (Active)   Number of days: 2       Urinary Catheter 01/07/25 Levine;Coude (Active)   Number of days: 1       Midline 01/08/25 Right Basilic (Active)   Number of days: 0       Wound 01/06/25 Pretibial Left;Anterior (Active)   Number of days: 1

## 2025-01-08 NOTE — PLAN OF CARE
Problem: Safety - Adult  Goal: Free from fall injury  1/8/2025 0511 by Hannah Cabrera RN  Outcome: Progressing     Problem: Discharge Planning  Goal: Discharge to home or other facility with appropriate resources  1/8/2025 0511 by Hannah Cabrera RN  Outcome: Progressing  Flowsheets (Taken 1/7/2025 2000)  Discharge to home or other facility with appropriate resources:   Identify barriers to discharge with patient and caregiver   Arrange for needed discharge resources and transportation as appropriate   Identify discharge learning needs (meds, wound care, etc)     Problem: Pain  Goal: Verbalizes/displays adequate comfort level or baseline comfort level  1/8/2025 0511 by Hannah Cabrera RN  Outcome: Progressing  Flowsheets  Taken 1/8/2025 0400  Verbalizes/displays adequate comfort level or baseline comfort level: Assess pain using appropriate pain scale  Taken 1/8/2025 0000  Verbalizes/displays adequate comfort level or baseline comfort level: Assess pain using appropriate pain scale  Taken 1/7/2025 2000  Verbalizes/displays adequate comfort level or baseline comfort level: Assess pain using appropriate pain scale     Problem: Safety - Medical Restraint  Goal: Remains free of injury from restraints (Restraint for Interference with Medical Device)  Description: INTERVENTIONS:  1. Determine that other, less restrictive measures have been tried or would not be effective before applying the restraint  2. Evaluate the patient's condition at the time of restraint application  3. Inform patient/family regarding the reason for restraint  4. Q2H: Monitor safety, psychosocial status, comfort, nutrition and hydration  1/8/2025 0511 by Hannah Cabrera RN  Outcome: Progressing  Flowsheets (Taken 1/8/2025 0349)  Remains free of injury from restraints (restraint for interference with medical device):   Determine that other, less restrictive measures have been tried or would not be effective before applying the restraint

## 2025-01-08 NOTE — PROGRESS NOTES
End Of Shift Note  [facility]  Summary of shift: Pt calm majority of shift. Around 3 am pt became combative, swinging at staff if they got anywhere near him. Pt scratched PCT and was aggressively grabbing both RN and PCTs arms and hands. Pt repeatedly yelling out. Restraints needed again. Precedex started and Haldol given once. Pt not oriented at all and has been saying random things entire shift. Sitter remains at bedside. No BM. 1,775 mls urine output. Pt currently sleeping on 0.2 of precedex.     Vitals:    Vitals:    01/08/25 0200 01/08/25 0300 01/08/25 0400 01/08/25 0500   BP: (!) 148/69 (!) 152/114 (!) 170/83 131/70   Pulse: (!) 122 (!) 103 (!) 108 (!) 104   Resp: 29 18 17 27   Temp:   97.7 °F (36.5 °C)    TempSrc:   Axillary    SpO2: 97% (!) 89% 92% (!) 85%   Weight:       Height:            I&O:   Intake/Output Summary (Last 24 hours) at 1/8/2025 0512  Last data filed at 1/8/2025 0247  Gross per 24 hour   Intake 134.93 ml   Output 3600 ml   Net -3465.07 ml       Resp Status: RA    Ventilator Settings:     / / /     Critical Care IV infusions:   sodium chloride      dexmedeTOMIDine (PRECEDEX) 400 mcg in sodium chloride 0.9 % 100 mL infusion 0.2 mcg/kg/hr (01/08/25 0337)        LDA:   Peripheral IV 01/06/25 Left (Active)   Number of days: 1       Urinary Catheter 01/07/25 Levine;Coude (Active)   Number of days: 1       Wound 01/06/25 Pretibial Left;Anterior (Active)   Number of days: 1

## 2025-01-08 NOTE — PROGRESS NOTES
[facility]  Consult Note       NAME:  Jose Robison  MEDICAL RECORD NUMBER:  [id number]  AGE: 89 y.o.   GENDER: male  : 1935  TODAY'S DATE:  2025    Subjective:      Jose Robison is a 89 y.o. male with inpatient referral to [facility] for:  \"Wound - left lower extremity\"      Wound Identification:  Wound Type:  unknown  Contributing Factors: decreased mobility and AMS    Wound History: patient with documented agitation and aggressiveness, currently on precedex infusion with sitter at bedside. Patient unable to participate meaningfully in history and unable to determine wound cause, length of time wound has been present, or prior treatment   Current Wound Care Treatment:  oil gauze, telfa, roll gauze, ACE    Patient Goal of Care:  [x] Wound Healing  [] Odor Control  [] Palliative Care  [] Pain Control   [] Other:         PAST MEDICAL HISTORY        Diagnosis Date    Acute renal failure (ARF) ([facility]) 2016    Bleeding in brain ([facility])     after fall    CHF due to valvular disease ([facility]) 2025    Coronary artery disease involving native coronary artery of native heart without angina pectoris 2016    Gastritis     Heart attack (HCC)     History of blood transfusion     Hyperlipidemia     Hypertension     Lumbar spinal stenosis 2017    Osteoarthritis     Rectal bleeding        PAST SURGICAL HISTORY    Past Surgical History:   Procedure Laterality Date    APPENDECTOMY      BACK SURGERY  40 YEARS AGO    CORONARY ANGIOPLASTY WITH STENT PLACEMENT      HAND SURGERY Left     HERNIA REPAIR      OTHER SURGICAL HISTORY      scalp surgery    UPPER GASTROINTESTINAL ENDOSCOPY  16    UPPER GASTROINTESTINAL ENDOSCOPY  2016    large and deep ulcer duodenal bulb, minimal bleeding    UPPER GASTROINTESTINAL ENDOSCOPY  3/16/16    ,ild antritis, signs of healed ulcer scar noted       FAMILY HISTORY    No family history on file.    SOCIAL HISTORY    Social  failure - Dr. 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    Spinal stenosis of lumbar region with neurogenic claudication M48.062    Mood disorder ([facility]) F39    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    Chronic pain syndrome G89.4    Suspected sleep apnea R29.818    CHF due to valvular disease ([facility]) I50.9, I38         Measurements:  Wound 01/06/25 Pretibial Left;Anterior (Active)   Wound Image   01/08/25 1025   Wound Etiology Other 01/08/25 1025   Dressing Status New dressing applied 01/08/25 1025   Wound Cleansed Cleansed with saline 01/08/25 1025   Dressing/Treatment Petroleum gauze;ABD;Roll gauze 01/08/25 1025   Dressing Change Due 01/10/25 01/08/25 1025   Wound Assessment Pink/red;Erythema;Superficial 01/08/25 1025   Drainage Amount Scant (moist but unmeasurable) 01/08/25 1025   Odor None 01/08/25 1025   Tyoa-wound Assessment Fragile 01/08/25 1025   Margins Defined edges 01/08/25 1025   Number of days: 1         WOUND ASSESSMENT:   Cluster of wounds appreciated to left pretibial. Superficial, red with S/S drainage noted to removed dressing. Small scabbed areas noted, stable without concern. Site cleansed, applied oil gauze, ABD, roll gauze and loose ACE wrap to secure.    Response to treatment:  Well tolerated by patient.       Plan:     Plan of Care:     Left pretibial wound care -  cleanse with saline, pat dry. Cover with oil emulsion dressing (Adaptic).  Pad with ABD and secure with roll gauze and ACE wrap.    [x] Turn and reposition every 2

## 2025-01-09 ENCOUNTER — APPOINTMENT (OUTPATIENT)
Dept: GENERAL RADIOLOGY | Age: 89
DRG: 291 | End: 2025-01-09
Payer: MEDICARE

## 2025-01-09 ENCOUNTER — APPOINTMENT (OUTPATIENT)
Dept: CT IMAGING | Age: 89
DRG: 291 | End: 2025-01-09
Payer: MEDICARE

## 2025-01-09 LAB
ALBUMIN SERPL-MCNC: 3.1 G/DL (ref 3.5–5.2)
ALBUMIN/GLOB SERPL: 1.2 {RATIO} (ref 1–2.5)
ALP SERPL-CCNC: 81 U/L (ref 40–129)
ALT SERPL-CCNC: 16 U/L (ref 10–50)
ANION GAP SERPL CALCULATED.3IONS-SCNC: 12 MMOL/L (ref 9–16)
AST SERPL-CCNC: 46 U/L (ref 10–50)
BILIRUB SERPL-MCNC: 0.6 MG/DL (ref 0–1.2)
BUN SERPL-MCNC: 32 MG/DL (ref 8–23)
CALCIUM SERPL-MCNC: 8.9 MG/DL (ref 8.8–10.2)
CHLORIDE SERPL-SCNC: 104 MMOL/L (ref 98–107)
CO2 SERPL-SCNC: 26 MMOL/L (ref 20–31)
CREAT SERPL-MCNC: 1.6 MG/DL (ref 0.7–1.2)
EKG ATRIAL RATE: 81 BPM
EKG Q-T INTERVAL: 364 MS
EKG QRS DURATION: 76 MS
EKG QTC CALCULATION (BAZETT): 455 MS
EKG R AXIS: -58 DEGREES
EKG T AXIS: 71 DEGREES
EKG VENTRICULAR RATE: 94 BPM
ERYTHROCYTE [DISTWIDTH] IN BLOOD BY AUTOMATED COUNT: 16.4 % (ref 11.8–14.4)
GFR, ESTIMATED: 40 ML/MIN/1.73M2
GLUCOSE BLD-MCNC: 116 MG/DL (ref 75–110)
GLUCOSE SERPL-MCNC: 104 MG/DL (ref 82–115)
HCT VFR BLD AUTO: 37.4 % (ref 40.7–50.3)
HGB BLD-MCNC: 11.3 G/DL (ref 13–17)
MCH RBC QN AUTO: 26.8 PG (ref 25.2–33.5)
MCHC RBC AUTO-ENTMCNC: 30.2 G/DL (ref 28–38)
MCV RBC AUTO: 88.6 FL (ref 82.6–102.9)
PLATELET # BLD AUTO: 202 K/UL (ref 138–453)
PMV BLD AUTO: 9.1 FL (ref 8.1–13.5)
POTASSIUM SERPL-SCNC: 4.2 MMOL/L (ref 3.7–5.3)
PROT SERPL-MCNC: 5.7 G/DL (ref 6.6–8.7)
RBC # BLD AUTO: 4.22 M/UL (ref 4.21–5.77)
SODIUM SERPL-SCNC: 142 MMOL/L (ref 136–145)
WBC OTHER # BLD: 8.5 K/UL (ref 3.5–11.3)

## 2025-01-09 PROCEDURE — 82947 ASSAY GLUCOSE BLOOD QUANT: CPT

## 2025-01-09 PROCEDURE — 2580000003 HC RX 258: Performed by: INTERNAL MEDICINE

## 2025-01-09 PROCEDURE — 99233 SBSQ HOSP IP/OBS HIGH 50: CPT | Performed by: FAMILY MEDICINE

## 2025-01-09 PROCEDURE — 71045 X-RAY EXAM CHEST 1 VIEW: CPT

## 2025-01-09 PROCEDURE — 2580000003 HC RX 258: Performed by: FAMILY MEDICINE

## 2025-01-09 PROCEDURE — 85027 COMPLETE CBC AUTOMATED: CPT

## 2025-01-09 PROCEDURE — 6360000002 HC RX W HCPCS: Performed by: INTERNAL MEDICINE

## 2025-01-09 PROCEDURE — 2700000000 HC OXYGEN THERAPY PER DAY

## 2025-01-09 PROCEDURE — 2500000003 HC RX 250 WO HCPCS: Performed by: FAMILY MEDICINE

## 2025-01-09 PROCEDURE — 6370000000 HC RX 637 (ALT 250 FOR IP): Performed by: FAMILY MEDICINE

## 2025-01-09 PROCEDURE — 36415 COLL VENOUS BLD VENIPUNCTURE: CPT

## 2025-01-09 PROCEDURE — 94761 N-INVAS EAR/PLS OXIMETRY MLT: CPT

## 2025-01-09 PROCEDURE — 6360000002 HC RX W HCPCS: Performed by: FAMILY MEDICINE

## 2025-01-09 PROCEDURE — 6370000000 HC RX 637 (ALT 250 FOR IP): Performed by: PSYCHIATRY & NEUROLOGY

## 2025-01-09 PROCEDURE — 2000000000 HC ICU R&B

## 2025-01-09 PROCEDURE — 6370000000 HC RX 637 (ALT 250 FOR IP): Performed by: NURSE PRACTITIONER

## 2025-01-09 PROCEDURE — 6360000002 HC RX W HCPCS: Performed by: NURSE PRACTITIONER

## 2025-01-09 PROCEDURE — 80053 COMPREHEN METABOLIC PANEL: CPT

## 2025-01-09 PROCEDURE — 70450 CT HEAD/BRAIN W/O DYE: CPT

## 2025-01-09 PROCEDURE — 99232 SBSQ HOSP IP/OBS MODERATE 35: CPT | Performed by: PSYCHIATRY & NEUROLOGY

## 2025-01-09 RX ORDER — BUMETANIDE 1 MG/1
1 TABLET ORAL DAILY
Status: DISCONTINUED | OUTPATIENT
Start: 2025-01-10 | End: 2025-01-10

## 2025-01-09 RX ORDER — MAGNESIUM SULFATE IN WATER 40 MG/ML
2000 INJECTION, SOLUTION INTRAVENOUS ONCE
Status: COMPLETED | OUTPATIENT
Start: 2025-01-09 | End: 2025-01-09

## 2025-01-09 RX ORDER — RIVASTIGMINE 4.6 MG/24H
1 PATCH, EXTENDED RELEASE TRANSDERMAL DAILY
Status: DISCONTINUED | OUTPATIENT
Start: 2025-01-09 | End: 2025-01-09

## 2025-01-09 RX ORDER — ATORVASTATIN CALCIUM 20 MG/1
20 TABLET, FILM COATED ORAL DAILY
Status: DISCONTINUED | OUTPATIENT
Start: 2025-01-09 | End: 2025-01-15 | Stop reason: HOSPADM

## 2025-01-09 RX ORDER — RIVASTIGMINE 4.6 MG/24H
1 PATCH, EXTENDED RELEASE TRANSDERMAL DAILY
Status: DISCONTINUED | OUTPATIENT
Start: 2025-01-09 | End: 2025-01-15 | Stop reason: HOSPADM

## 2025-01-09 RX ORDER — METOPROLOL SUCCINATE 50 MG/1
50 TABLET, EXTENDED RELEASE ORAL DAILY
Status: DISCONTINUED | OUTPATIENT
Start: 2025-01-09 | End: 2025-01-12

## 2025-01-09 RX ORDER — MAGNESIUM SULFATE 1 G/100ML
1000 INJECTION INTRAVENOUS ONCE
Status: COMPLETED | OUTPATIENT
Start: 2025-01-09 | End: 2025-01-09

## 2025-01-09 RX ADMIN — CEFAZOLIN 1000 MG: 1 INJECTION, POWDER, FOR SOLUTION INTRAMUSCULAR; INTRAVENOUS at 13:18

## 2025-01-09 RX ADMIN — QUETIAPINE FUMARATE 50 MG: 25 TABLET ORAL at 20:38

## 2025-01-09 RX ADMIN — CEFAZOLIN 1000 MG: 1 INJECTION, POWDER, FOR SOLUTION INTRAMUSCULAR; INTRAVENOUS at 05:39

## 2025-01-09 RX ADMIN — DEXMEDETOMIDINE 0.2 MCG/KG/HR: 100 INJECTION, SOLUTION INTRAVENOUS at 04:27

## 2025-01-09 RX ADMIN — HALOPERIDOL LACTATE 6 MG: 5 INJECTION, SOLUTION INTRAMUSCULAR at 09:22

## 2025-01-09 RX ADMIN — METOPROLOL TARTRATE 25 MG: 25 TABLET, FILM COATED ORAL at 08:04

## 2025-01-09 RX ADMIN — ACETAMINOPHEN 650 MG: 325 TABLET ORAL at 04:58

## 2025-01-09 RX ADMIN — DEXTROSE MONOHYDRATE: 50 INJECTION, SOLUTION INTRAVENOUS at 04:39

## 2025-01-09 RX ADMIN — BUMETANIDE 1 MG: 0.25 INJECTION INTRAMUSCULAR; INTRAVENOUS at 08:04

## 2025-01-09 RX ADMIN — MAGNESIUM SULFATE 1000 MG: 1 INJECTION INTRAVENOUS at 14:38

## 2025-01-09 RX ADMIN — CLOPIDOGREL BISULFATE 75 MG: 75 TABLET ORAL at 08:04

## 2025-01-09 RX ADMIN — SODIUM CHLORIDE, PRESERVATIVE FREE 10 ML: 5 INJECTION INTRAVENOUS at 20:47

## 2025-01-09 RX ADMIN — SODIUM CHLORIDE, PRESERVATIVE FREE 10 ML: 5 INJECTION INTRAVENOUS at 08:14

## 2025-01-09 RX ADMIN — METOPROLOL SUCCINATE 50 MG: 50 TABLET, EXTENDED RELEASE ORAL at 12:29

## 2025-01-09 RX ADMIN — CEFAZOLIN 1000 MG: 1 INJECTION, POWDER, FOR SOLUTION INTRAMUSCULAR; INTRAVENOUS at 22:00

## 2025-01-09 RX ADMIN — QUETIAPINE FUMARATE 25 MG: 25 TABLET ORAL at 08:04

## 2025-01-09 RX ADMIN — MAGNESIUM SULFATE HEPTAHYDRATE 2000 MG: 40 INJECTION, SOLUTION INTRAVENOUS at 10:41

## 2025-01-09 RX ADMIN — MICONAZOLE NITRATE: 20 CREAM TOPICAL at 20:44

## 2025-01-09 ASSESSMENT — PAIN SCALES - PAIN ASSESSMENT IN ADVANCED DEMENTIA (PAINAD)
BREATHING: NORMAL
FACIALEXPRESSION: SAD, FRIGHTENED, FROWN
BODYLANGUAGE: TENSE, DISTRESSED PACING, FIDGETING
BREATHING: NORMAL
TOTALSCORE: 5
BREATHING: OCCASIONAL LABORED BREATHING, SHORT PERIOD OF HYPERVENTILATION
CONSOLABILITY: NO NEED TO CONSOLE
FACIALEXPRESSION: SMILING OR INEXPRESSIVE
FACIALEXPRESSION: SMILING OR INEXPRESSIVE
TOTALSCORE: 0
CONSOLABILITY: NO NEED TO CONSOLE
BODYLANGUAGE: RELAXED
TOTALSCORE: 0
BODYLANGUAGE: RELAXED
CONSOLABILITY: DISTRACTED OR REASSURED BY VOICE/TOUCH
NEGVOCALIZATION: OCCASIONAL MOAN/GROAN, LOW SPEECH, NEGATIVE/DISAPPROVING QUALITY

## 2025-01-09 ASSESSMENT — PAIN SCALES - GENERAL
PAINLEVEL_OUTOF10: 5
PAINLEVEL_OUTOF10: 0

## 2025-01-09 NOTE — CARE COORDINATION
Social Work-Met with patient's wife. Discussed that Neurology is recommending a dementia unit for patient.                    Post Acute Facility/Agency List     Provided spouse with the following list, the list includes the overall star ratings obtained from CMS per the Medicare Web site (www.Medicare.gov):     [] Long Term Acute Care Facilities  [] Acute Inpatient Rehabilitation Facilities  [x] Skilled Nursing Facilities  [] Hospice Facilities  [] Home Care    Provided verbal instructions on how to utilize the QR Code to obtain additional detailed star ratings from www.Medicare.gov     offered to print and provide the detailed list:    Discussed locations  of dementia units Wife would like to review list. Will contact wife for additional choices. Noa

## 2025-01-09 NOTE — PLAN OF CARE
Problem: Safety - Adult  Goal: Free from fall injury  1/9/2025 0111 by Hannah Cabrera RN  Outcome: Progressing     Problem: Chronic Conditions and Co-morbidities  Goal: Patient's chronic conditions and co-morbidity symptoms are monitored and maintained or improved  1/9/2025 0111 by Hannah Cabrera RN  Outcome: Progressing     Problem: Discharge Planning  Goal: Discharge to home or other facility with appropriate resources  1/9/2025 0111 by Hannah Cabrera RN  Outcome: Progressing     Problem: Pain  Goal: Verbalizes/displays adequate comfort level or baseline comfort level  1/9/2025 0111 by Hannah Cabrera RN  Outcome: Progressing  Flowsheets (Taken 1/8/2025 2000)  Verbalizes/displays adequate comfort level or baseline comfort level: Assess pain using appropriate pain scale     Problem: Safety - Medical Restraint  Goal: Remains free of injury from restraints (Restraint for Interference with Medical Device)  Description: INTERVENTIONS:  1. Determine that other, less restrictive measures have been tried or would not be effective before applying the restraint  2. Evaluate the patient's condition at the time of restraint application  3. Inform patient/family regarding the reason for restraint  4. Q2H: Monitor safety, psychosocial status, comfort, nutrition and hydration  1/8/2025 1800 by Jose M Hanson, RN  Outcome: Progressing  Flowsheets  Taken 1/8/2025 1200  Remains free of injury from restraints (restraint for interference with medical device): Determine that other, less restrictive measures have been tried or would not be effective before applying the restraint  Taken 1/8/2025 1000  Remains free of injury from restraints (restraint for interference with medical device): Determine that other, less restrictive measures have been tried or would not be effective before applying the restraint  Taken 1/8/2025 0800  Remains free of injury from restraints (restraint for interference with medical device): Determine that

## 2025-01-09 NOTE — DISCHARGE INSTR - COC
Continuity of Care Form    Patient Name: Jose Robison   :  1935  MRN:  [id number]    Admit date:  2025  Discharge date:  ***    Code Status Order: Full Code   Advance Directives:   Advance Care Flowsheet Documentation             Admitting Physician:  Sepideh Gresham MD  PCP: Sepideh Gresham MD    Discharging Nurse: ***  Discharging Hospital Unit/Room#: 1110/1110-01  Discharging Unit Phone Number: ***    Emergency Contact:   Extended Emergency Contact Information  Primary Emergency Contact: Lisa Robison  Address: [address] [place] [address] [place] [address]  Home Phone: [phone]  Relation: Spouse  Secondary Emergency Contact: Aria Robison  Mobile Phone: [phone]  Relation: Child    Past Surgical History:  Past Surgical History:   Procedure Laterality Date    APPENDECTOMY      BACK SURGERY  40 YEARS AGO    CORONARY ANGIOPLASTY WITH STENT PLACEMENT      HAND SURGERY Left     HERNIA REPAIR      OTHER SURGICAL HISTORY      scalp surgery    UPPER GASTROINTESTINAL ENDOSCOPY  16    UPPER GASTROINTESTINAL ENDOSCOPY  2016    large and deep ulcer duodenal bulb, minimal bleeding    UPPER GASTROINTESTINAL ENDOSCOPY  3/16/16    ,ild antritis, signs of healed ulcer scar noted       Immunization History:   Immunization History   Administered Date(s) Administered    COVID-19, PFIZER Bivalent, DO NOT Dilute, (age 12y+), IM, 30 mcg/0.3 mL 2022    COVID-19, PFIZER PURPLE top, DILUTE for use, (age 12 y+), 30mcg/0.3mL 2021, 05/15/2021, 2021    COVID-19, PFIZER, (age 12y+), IM, 30mcg/0.3mL 11/10/2023    Influenza Virus Vaccine 10/05/2015, 10/09/2020    Influenza, AFLURIA (age 3 y+), FLUZONE, (age 6 mo+), Quadv MDV, 0.5mL 10/07/2016    Influenza, FLUZONE High Dose (age 65 y+), IM, Quadv, 0.7mL 10/08/2021    Pneumococcal, PCV-13, PREVNAR 13, (age 6w+), IM, 0.5mL 10/07/2016    Pneumococcal, PPSV23, PNEUMOVAX 23, (age 2y+), SC/IM, 0.5mL 2014    TDaP, ADACEL (age 10y-64y), BOOSTRIX (age 10y+),  { VALERIE MED Delivery:995449907}    Wound Care Documentation and Therapy:  Wound 25 Pretibial Left;Anterior (Active)   Wound Image   25 1025   Wound Etiology Other 25 1025   Dressing Status Clean;Dry;Intact 25 0400   Wound Cleansed Not Cleansed 25 0400   Dressing/Treatment Ace wrap 25 0400   Dressing Change Due 01/10/25 01/08/25 1025   Wound Assessment Pink/red;Erythema 25 0400   Drainage Amount None (dry) 25 0400   Odor None 25 0400   Toya-wound Assessment Blanchable erythema;Excoriated 25 0400   Margins Defined edges 25 0400   Number of days: 2        Elimination:  Continence:   Bowel: {YES / NO:}  Bladder: {YES / NO:}  Urinary Catheter: {Urinary Catheter:790148905}   Colostomy/Ileostomy/Ileal Conduit: {YES / NO:}       Date of Last BM: ***    Intake/Output Summary (Last 24 hours) at 2025 1658  Last data filed at 2025 1439  Gross per 24 hour   Intake 1616.7 ml   Output 2875 ml   Net -1258.3 ml     I/O last 3 completed shifts:  In: -   Out: 4875 [Urine:4875]    Safety Concerns:     { VALERIE Safety Concerns:802960380}    Impairments/Disabilities:      {[facility] [place]airments/Disabilities:541109300}    Nutrition Therapy:  Current Nutrition Therapy:   { VALERIE Diet List:205219930}    Routes of Feeding: {CHP DME Other Feedings:100847225}  Liquids: {Slp liquid thickness:45555}  Daily Fluid Restriction: {CHP DME Yes amt example:134712055}  Last Modified Barium Swallow with Video (Video Swallowing Test): {Done Not Done Date:}    Treatments at the Time of Hospital Discharge:   Respiratory Treatments: ***  Oxygen Therapy:  {Therapy; copd oxygen:93547}  Ventilator:    { CC Vent List:566793217}    Rehab Therapies: {THERAPEUTIC INTERVENTION:9774872049}  Weight Bearing Status/Restrictions: { CC Weight Bearin}  Other Medical Equipment (for information only, NOT a DME order):  {EQUIPMENT:612756027}  Other Treatments:

## 2025-01-09 NOTE — PROGRESS NOTES
Physical Therapy        Physical Therapy Cancel Note      DATE: 2025    NAME: Jose Robison  MRN: [id number]   : 1935      Patient not seen this date for Physical Therapy due to:    Pt. Still on low dose precedex. Unarousable.       Electronically signed by MASON DE DIOS, PT on 2025 at 3:44 PM

## 2025-01-09 NOTE — PROGRESS NOTES
[facility] Neurology   IN-PATIENT SERVICE      NEUROLOGY PROGRESS  NOTE                Interval History:     Refused seroquel last night, took it this AM.   Was placed on precedex due to agitation. Also received haldol this morning.   Still very restless, confused, dysarthric.     History of Present Illness:     The patient is a 89 y.o. male who presents with Wound Check (Left leg, pt's wife and pt very poor historians. Wife states she noticed leg swelling \"a few days ago\" and that pt has dementia. Pt states he is not sure what caused wound on left leg. Pt's wife states that he does wander over night and could of possibly burned leg. )  . The patient was seen and examined and the chart was reviewed.  Patient initially presenting to the hospital due to what was described as a wound on the left lower extremity on 1/6.  Patient resides at home with wife.  Reportedly has history of dementia and is a poor historian.  During his hospitalization he has had significant delirium, sundowning at night requiring antipsychotics and even Precedex infusion.  Currently in the medical ICU being treated with Precedex which actually has been weaned off over the last 24 hours.  During the day today has been fairly calm and somnolent as per nursing report.  Continues to have a one-to-one sitter.  Patient is very hard of hearing as is his wife reportedly.  He has history of a flutter/A-fib not compliant on anticoagulation.  Currently being treated for left lower extremity cellulitis as well as acute delirium.     At this time patient is awake alert to self.  Able to follow commands with multiple prompts.  He is very hard of hearing.  Speech is dysarthric due to dry mouth.  He has not really eaten or drank much today.  Has been off of Precedex sedation today.  He received Seroquel overnight.  Pulmonology has been consulted for critical care management.  No family numbers at bedside.    Physical Exam:   /84   Pulse (!) 101    Temp 98.6 °F (37 °C) (Oral)   Resp 22   Ht 1.676 m (5' 6\")   Wt 91.6 kg (202 lb)   SpO2 98%   BMI 32.60 kg/m²   Temp (24hrs), Av °F (36.7 °C), Min:97.6 °F (36.4 °C), Max:98.6 °F (37 °C)      Neurological examination:    Mental status    Alert and oriented to self; following basic commands with repeated prompting; speech is very dysarthric.  Impulsive.      Cranial nerves    II - visual fields intact to confrontation; pupils reactive  III, IV, VI - extraocular muscles intact; no LEIGH; no nystagmus; no ptosis   V - normal facial sensation                                                               VII - normal facial symmetry                                                             VIII - intact hearing                                                                             IX, X - symmetrical palate elevation                                               XI - symmetrical shoulder shrug                                                       XII - midline tongue without atrophy or fasciculation      Motor function  Strength: Moves all 4 extremities symmetrically  Normal bulk and tone.   Mild postural tremor present with bilateral upper extremities                Sensory function Withdraws to pain in all 4 extremities symmetrically      Cerebellar Intact finger-nose-finger testing.      Reflex function 1/4 symmetric throughout . Downgoing plantar response bilaterally. (-)Marroquin's sign bilaterally    Gait                  Not assessed due to high fall risk         Diagnostics:      Laboratory Testing:  CBC:   Recent Labs     25  0422 25  0515 25  0638   WBC 9.5 11.1 8.5   HGB 10.0* 11.5* 11.3*    210 202     BMP:    Recent Labs     25  0422 25  0515 25  0638    145 142   K 4.1 4.1 4.2   * 107 104   CO2 23 25 26   BUN 32* 35* 32*   CREATININE 1.5* 1.5* 1.6*   GLUCOSE 97 92 104         Lab Results   Component Value Date    CHOL 139 2017    HDL 35

## 2025-01-09 NOTE — PROGRESS NOTES
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  He still agitated but more alert today.  He is off Precedex.  He received declines medication intake per RN.  He still not eating.  He is bedridden.  Sitter at bedside    Examination:    Vitals: BP (!) 120/91   Pulse (!) 104   Temp 98.8 °F (37.1 °C) (Oral)   Resp 18   Ht 1.676 m (5' 6\")   Wt 91.6 kg (202 lb)   SpO2 97%   BMI 32.60 kg/m²   SpO2  Av.6 %  Min: 84 %  Max: 100 %  General appearance: alert confused and cooperative with exam  Neck: No JVD  Lungs: Decreased breath sound no crackles or wheezing  Heart: regular rate and rhythm, S1, S2 normal, no gallop  Abdomen: Soft, non tender, + BS  Extremities: no cyanosis or clubbing. No significant edema left lower extremity wound/burn    LABs:    CBC:   Recent Labs     25  0515 25  0638   WBC 11.1 8.5   HGB 11.5* 11.3*   HCT 36.9* 37.4*    202     BMP:   Recent Labs     25  0515 25  0638    142   K 4.1 4.2   CO2 25 26   BUN 35* 32*   CREATININE 1.5* 1.6*   LABGLOM 43* 40*   GLUCOSE 92 104     PT/INR: No results for input(s): \"PROTIME\", \"INR\" in the last 72 hours.  APTT:No results for input(s): \"APTT\" in the last 72 hours.  LIVER PROFILE:  Recent Labs     25  0515 25  0638   AST 36 46   ALT 19 16       Radiology:    Chest x-ray   1.  Moderate pulmonary vascular congestion with perihilar to infrahilar  opacities suggesting edema.     2.  Retrocardiac regions have reduced sensitivity and summation but likely  additional opacities.  At least small amounts of pleural effusion are present  and could have basilar atelectasis.     Impression/recommendations:    Acute hypoxic respiratory insufficiency/ pulmonary edema   Oxygen by nasal cannula  Incentive spirometry every hour while awake  Chest x-ray in AM   On Bumex IV      Metabolic

## 2025-01-09 NOTE — PROGRESS NOTES
Progress Note    Subjective:  New onset heart failure  Acute respiratory failure  Dementia with psychosis  Sundowning  Acute on chronic renal failure with crenated azotemia  Morbid obesity with sleep apnea  The patient is awake and alert.  No problems overnight.  Denies chest pain, angina, and dyspnea.  Denies abdominal pain.  Tolerating diet.  No nausea or vomiting.    Admit Date:  1/6/2025    Patient Seen, Chart, Labs, Radiology studies, and Consults reviewed.    Objective:   /87   Pulse 93   Temp 98.8 °F (37.1 °C) (Oral)   Resp 23   Ht 1.676 m (5' 6\")   Wt 91.6 kg (202 lb)   SpO2 100%   BMI 32.60 kg/m²     Intake/Output Summary (Last 24 hours) at 1/9/2025 1654  Last data filed at 1/9/2025 1439  Gross per 24 hour   Intake 1616.7 ml   Output 2875 ml   Net -1258.3 ml     General appearance - alert, well appearing, and in no distress and oriented to person, place, and time  Heart:  RRR, no murmurs, gallops, or rubs, extrasystoles.  Lungs:  CTA bilaterally, no wheeze, rales or rhonchi, good air entry, good respiratory effort  Abd: bowel sounds present, nontender, nondistended, no masses, no rigidity, no guarding, no peritoneal signs.  Integumentary: Skin good color, good turgor, no rashes, no acute lesions  Upper Extrem:  No clubbing bilateral hands, no cyanosis or edema  Lower Extrem:no cyanosis or edema, no calf tenderness to lower extremities  Neurological - alert, oriented, normal speech, no focal findings or movement disorder noted, neck supple without rigidity, motor and sensory grossly normal bilaterally      Medications:    atorvastatin  20 mg Oral Daily    metoprolol succinate  50 mg Oral Daily    rivastigmine  1 patch TransDERmal Daily    [START ON 1/10/2025] bumetanide  1 mg Oral Daily    ceFAZolin  1,000 mg IntraVENous Q8H    sodium chloride flush  5-40 mL IntraVENous 2 times per day    QUEtiapine  25 mg Oral Daily    QUEtiapine  50 mg Oral Nightly    aspirin  81 mg Oral Daily    clopidogrel

## 2025-01-09 NOTE — PROGRESS NOTES
Pt remained alert and disoriented throughout day of care. Pt received 1 dose IM haldol, scheduled seroquel and ran precedex for approx 2 hours. Restraints off throughout day. Precedex turned off for hypotension and blood pressure resolved. CT head was performed, resulting in NAP. Pt refusing most meals. Levine maintained with good urine output. Large red lesion with scab noted to top of left ear.

## 2025-01-09 NOTE — PROGRESS NOTES
Subjective: Agitated again overnight. Refused PM medications yesterday.     VS: BP (!) 137/101   Pulse (!) 115   Temp 98.6 °F (37 °C) (Oral)   Resp 16   Ht 1.676 m (5' 6\")   Wt 91.6 kg (202 lb)   SpO2 98%   BMI 32.60 kg/m²     Wt:     I/O: In: -   Out: 3600 [Urine:3600]    Monitor: Atrial flutter with vrate 90's-one teens    Meds: Scheduled Meds:   metoprolol tartrate  25 mg Oral BID    ceFAZolin  1,000 mg IntraVENous Q8H    sodium chloride flush  5-40 mL IntraVENous 2 times per day    QUEtiapine  25 mg Oral Daily    QUEtiapine  50 mg Oral Nightly    aspirin  81 mg Oral Daily    clopidogrel  75 mg Oral Daily    bumetanide  1 mg IntraVENous Daily    morphine  4 mg IntraVENous Once    miconazole   Topical BID    sodium chloride flush  5-40 mL IntraVENous 2 times per day    enoxaparin  40 mg SubCUTAneous Daily     Continuous Infusions:   dextrose 50 mL/hr at 01/09/25 0439    sodium chloride      sodium chloride      dexmedeTOMIDine (PRECEDEX) 400 mcg in sodium chloride 0.9 % 100 mL infusion 0.2 mcg/kg/hr (01/09/25 0427)     PRN Meds:.sodium chloride flush, sodium chloride, sodium chloride flush, sodium chloride, acetaminophen, ondansetron **OR** ondansetron, haloperidol lactate     Exam:General Appearance: Confused, restless  Skin: warm, dry  Pulmonary/Chest: Mildly diminished at bases anteriorly  Cardiovascular: irregular, borderline tachycardic, negative JVD  Extremities: Mild peripheral edema    Labs:    CBC with Differential:    Lab Results   Component Value Date/Time    WBC 8.5 01/09/2025 06:38 AM    RBC 4.22 01/09/2025 06:38 AM    HGB 11.3 01/09/2025 06:38 AM    HCT 37.4 01/09/2025 06:38 AM     01/09/2025 06:38 AM     09/23/2011 07:51 AM    MCV 88.6 01/09/2025 06:38 AM    MCH 26.8 01/09/2025 06:38 AM    MCHC 30.2 01/09/2025 06:38 AM    RDW 16.4 01/09/2025 06:38 AM    LYMPHOPCT 14 01/06/2025 03:10 PM    MONOPCT 11 01/06/2025 03:10 PM    EOSPCT 1 01/06/2025 03:10 PM    BASOPCT 1 01/06/2025  03:10 PM    MONOSABS 0.97 01/06/2025 03:10 PM    LYMPHSABS 1.17 01/06/2025 03:10 PM    EOSABS 0.12 01/06/2025 03:10 PM    BASOSABS 0.05 01/06/2025 03:10 PM    DIFFTYPE NOT REPORTED 12/14/2021 07:52 AM       CMP:    Lab Results   Component Value Date/Time     01/09/2025 06:38 AM    K 4.2 01/09/2025 06:38 AM     01/09/2025 06:38 AM    CO2 26 01/09/2025 06:38 AM    BUN 32 01/09/2025 06:38 AM    CREATININE 1.6 01/09/2025 06:38 AM    GFRAA 59 12/14/2021 07:52 AM    LABGLOM 40 01/09/2025 06:38 AM    LABGLOM 49 10/18/2023 09:12 AM    GLUCOSE 104 01/09/2025 06:38 AM    CALCIUM 8.9 01/09/2025 06:38 AM    BILITOT 0.6 01/09/2025 06:38 AM    ALKPHOS 81 01/09/2025 06:38 AM    AST 46 01/09/2025 06:38 AM    ALT 16 01/09/2025 06:38 AM   Magnesium:    Lab Results   Component Value Date/Time    MG 1.4 01/08/2025 05:15 AM       A/P: 1. CAD  -No angina. On B. Blocker. On ASA, Plavix, and Statin.      2. HTN  -Hypertensive. BP improving after AM meds given.      3. Hypercholesterolemia  -On Statin.      4. Chronic atrial flutter  -Vrate 90's-one teens. Patient has declined anticoagulation. Will change to Toprol QAM since patient has been refusing PM meds.      5. CHF  -Acute LV diastolic dysfunction. On Bumex 1mg QD. In negative balance.      6. Moderate AI, moderate MR, mild to moderate TR  -Medical therapy.      7. NSVT  -No reoccurrence.     8. Hypomagnesemia  -Supplement.      Will discuss with Dr. Jenkins    Electronically signed by PAMELA Hannah - CNP on 1/9/2025 at 9:45 AM

## 2025-01-09 NOTE — CARE COORDINATION
Social Work-[facility] will not have a bed available. Neurology is recommending a dementia unit. Will discuss with wife. Left message with wife.

## 2025-01-09 NOTE — CARE COORDINATION
Social Work- Wife is requesting a referral to [facility] [place]. [facility]. Sent referrals. Noa

## 2025-01-09 NOTE — PROGRESS NOTES
Restraints had to be ordered again tonight to prevent staff injury. Attending notified and gave okay for restraints. Pt also refused night time dose of seroquel and metoprolol. Pt was trying to spit on nurse and tell her he already had his medications and would turn head away when medications were offered.

## 2025-01-09 NOTE — PROGRESS NOTES
Occupational Therapy  [facility]  Occupational Therapy Not Seen Note    Patient not available for Occupational Therapy due to:    [] Testing:    [] Hemodialysis    [] Cancelled by RN:    []Refusal by Patient:    [] Surgery:     [] Intubation:     [] Pain Medication:    [] Sedation:     [] Spine Precautions :    [] Medical Instability:    [x] Other: pt unable to be aroused to participate in therapy. Remains on precedex drip as well.     Klaudia Newman, OTR/L

## 2025-01-09 NOTE — PROGRESS NOTES
Pt became very paranoid and agitated at 3am trying to hit and kick writer. Pt yelling \"get her out of here she has a knife\" and would swing at writer if she got anywhere near pt. Pt swinging legs over side rales and trying to get out of bed while yelling his own name. Pt was calm but restless for shift up until 3am. This has been the pattern for the past 2 nights.

## 2025-01-10 ENCOUNTER — APPOINTMENT (OUTPATIENT)
Dept: GENERAL RADIOLOGY | Age: 89
DRG: 291 | End: 2025-01-10
Payer: MEDICARE

## 2025-01-10 LAB
ANION GAP SERPL CALCULATED.3IONS-SCNC: 8 MMOL/L (ref 9–16)
BASOPHILS # BLD: 0.07 K/UL (ref 0–0.2)
BASOPHILS NFR BLD: 1 %
BNP SERPL-MCNC: 4320 PG/ML (ref 0–450)
BUN SERPL-MCNC: 25 MG/DL (ref 8–23)
CALCIUM SERPL-MCNC: 8.7 MG/DL (ref 8.8–10.2)
CHLORIDE SERPL-SCNC: 103 MMOL/L (ref 98–107)
CO2 SERPL-SCNC: 31 MMOL/L (ref 20–31)
CREAT SERPL-MCNC: 1.3 MG/DL (ref 0.7–1.2)
EOSINOPHIL # BLD: 0.07 K/UL (ref 0–0.4)
EOSINOPHILS RELATIVE PERCENT: 1 % (ref 1–4)
ERYTHROCYTE [DISTWIDTH] IN BLOOD BY AUTOMATED COUNT: 16.3 % (ref 11.8–14.4)
GFR, ESTIMATED: 53 ML/MIN/1.73M2
GLUCOSE SERPL-MCNC: 112 MG/DL (ref 82–115)
HCT VFR BLD AUTO: 36 % (ref 40.7–50.3)
HGB BLD-MCNC: 10.7 G/DL (ref 13–17)
IMM GRANULOCYTES # BLD AUTO: 0 K/UL (ref 0–0.3)
IMM GRANULOCYTES NFR BLD: 0 %
LYMPHOCYTES NFR BLD: 0.6 K/UL (ref 1–4.8)
LYMPHOCYTES RELATIVE PERCENT: 9 % (ref 24–44)
MCH RBC QN AUTO: 27.2 PG (ref 25.2–33.5)
MCHC RBC AUTO-ENTMCNC: 29.7 G/DL (ref 28.4–34.8)
MCV RBC AUTO: 91.6 FL (ref 82.6–102.9)
MICROORGANISM SPEC CULT: NORMAL
MICROORGANISM/AGENT SPEC: NORMAL
MICROORGANISM/AGENT SPEC: NORMAL
MONOCYTES NFR BLD: 0.4 K/UL (ref 0.2–0.8)
MONOCYTES NFR BLD: 6 % (ref 1–7)
MORPHOLOGY: ABNORMAL
MORPHOLOGY: ABNORMAL
NEUTROPHILS NFR BLD: 83 % (ref 36–66)
NEUTS SEG NFR BLD: 5.56 K/UL (ref 1.8–7.7)
NRBC BLD-RTO: 0 PER 100 WBC
PLATELET # BLD AUTO: 182 K/UL (ref 138–453)
PMV BLD AUTO: 9.3 FL (ref 8.1–13.5)
POTASSIUM SERPL-SCNC: 4.3 MMOL/L (ref 3.7–5.3)
RBC # BLD AUTO: 3.93 M/UL (ref 4.21–5.77)
SODIUM SERPL-SCNC: 142 MMOL/L (ref 136–145)
SPECIMEN DESCRIPTION: NORMAL
WBC OTHER # BLD: 6.7 K/UL (ref 3.5–11.3)

## 2025-01-10 PROCEDURE — 2580000003 HC RX 258: Performed by: FAMILY MEDICINE

## 2025-01-10 PROCEDURE — 94761 N-INVAS EAR/PLS OXIMETRY MLT: CPT

## 2025-01-10 PROCEDURE — 2500000003 HC RX 250 WO HCPCS: Performed by: FAMILY MEDICINE

## 2025-01-10 PROCEDURE — 80048 BASIC METABOLIC PNL TOTAL CA: CPT

## 2025-01-10 PROCEDURE — 71045 X-RAY EXAM CHEST 1 VIEW: CPT

## 2025-01-10 PROCEDURE — 36415 COLL VENOUS BLD VENIPUNCTURE: CPT

## 2025-01-10 PROCEDURE — 2700000000 HC OXYGEN THERAPY PER DAY

## 2025-01-10 PROCEDURE — 99231 SBSQ HOSP IP/OBS SF/LOW 25: CPT | Performed by: PSYCHIATRY & NEUROLOGY

## 2025-01-10 PROCEDURE — 83880 ASSAY OF NATRIURETIC PEPTIDE: CPT

## 2025-01-10 PROCEDURE — 85025 COMPLETE CBC W/AUTO DIFF WBC: CPT

## 2025-01-10 PROCEDURE — 99233 SBSQ HOSP IP/OBS HIGH 50: CPT | Performed by: FAMILY MEDICINE

## 2025-01-10 PROCEDURE — 2580000003 HC RX 258: Performed by: INTERNAL MEDICINE

## 2025-01-10 PROCEDURE — 6370000000 HC RX 637 (ALT 250 FOR IP): Performed by: INTERNAL MEDICINE

## 2025-01-10 PROCEDURE — 6360000002 HC RX W HCPCS: Performed by: INTERNAL MEDICINE

## 2025-01-10 PROCEDURE — 2000000000 HC ICU R&B

## 2025-01-10 PROCEDURE — 6370000000 HC RX 637 (ALT 250 FOR IP): Performed by: PSYCHIATRY & NEUROLOGY

## 2025-01-10 RX ORDER — DEXTROSE MONOHYDRATE 50 MG/ML
INJECTION, SOLUTION INTRAVENOUS CONTINUOUS
Status: DISCONTINUED | OUTPATIENT
Start: 2025-01-10 | End: 2025-01-12

## 2025-01-10 RX ORDER — BUMETANIDE 1 MG/1
1 TABLET ORAL
Status: DISCONTINUED | OUTPATIENT
Start: 2025-01-10 | End: 2025-01-10

## 2025-01-10 RX ORDER — FUROSEMIDE 10 MG/ML
40 INJECTION INTRAMUSCULAR; INTRAVENOUS ONCE
Status: COMPLETED | OUTPATIENT
Start: 2025-01-10 | End: 2025-01-10

## 2025-01-10 RX ORDER — BUMETANIDE 1 MG/1
1 TABLET ORAL
Status: DISCONTINUED | OUTPATIENT
Start: 2025-01-13 | End: 2025-01-15 | Stop reason: HOSPADM

## 2025-01-10 RX ADMIN — FUROSEMIDE 40 MG: 10 INJECTION, SOLUTION INTRAMUSCULAR; INTRAVENOUS at 17:55

## 2025-01-10 RX ADMIN — DEXTROSE MONOHYDRATE: 50 INJECTION, SOLUTION INTRAVENOUS at 11:38

## 2025-01-10 RX ADMIN — MICONAZOLE NITRATE: 20 CREAM TOPICAL at 22:03

## 2025-01-10 RX ADMIN — SODIUM CHLORIDE, PRESERVATIVE FREE 10 ML: 5 INJECTION INTRAVENOUS at 22:01

## 2025-01-10 RX ADMIN — DEXMEDETOMIDINE 0.8 MCG/KG/HR: 100 INJECTION, SOLUTION INTRAVENOUS at 21:05

## 2025-01-10 RX ADMIN — CEFAZOLIN 1000 MG: 1 INJECTION, POWDER, FOR SOLUTION INTRAMUSCULAR; INTRAVENOUS at 13:33

## 2025-01-10 RX ADMIN — SODIUM CHLORIDE, PRESERVATIVE FREE 10 ML: 5 INJECTION INTRAVENOUS at 11:05

## 2025-01-10 RX ADMIN — CEFAZOLIN 1000 MG: 1 INJECTION, POWDER, FOR SOLUTION INTRAMUSCULAR; INTRAVENOUS at 06:03

## 2025-01-10 RX ADMIN — DEXMEDETOMIDINE 0.4 MCG/KG/HR: 100 INJECTION, SOLUTION INTRAVENOUS at 13:57

## 2025-01-10 RX ADMIN — CEFAZOLIN 1000 MG: 1 INJECTION, POWDER, FOR SOLUTION INTRAMUSCULAR; INTRAVENOUS at 21:58

## 2025-01-10 RX ADMIN — QUETIAPINE FUMARATE 50 MG: 25 TABLET ORAL at 21:57

## 2025-01-10 ASSESSMENT — PAIN SCALES - GENERAL
PAINLEVEL_OUTOF10: 0
PAINLEVEL_OUTOF10: 2
PAINLEVEL_OUTOF10: 2
PAINLEVEL_OUTOF10: 1
PAINLEVEL_OUTOF10: 0
PAINLEVEL_OUTOF10: 1

## 2025-01-10 ASSESSMENT — PAIN SCALES - PAIN ASSESSMENT IN ADVANCED DEMENTIA (PAINAD)
CONSOLABILITY: NO NEED TO CONSOLE
BODYLANGUAGE: TENSE, DISTRESSED PACING, FIDGETING
CONSOLABILITY: NO NEED TO CONSOLE
TOTALSCORE: 2
TOTALSCORE: 2
FACIALEXPRESSION: SAD, FRIGHTENED, FROWN
BREATHING: NORMAL
BODYLANGUAGE: TENSE, DISTRESSED PACING, FIDGETING
FACIALEXPRESSION: SMILING OR INEXPRESSIVE
TOTALSCORE: 1
BREATHING: NORMAL
TOTALSCORE: 1
TOTALSCORE: 2
FACIALEXPRESSION: SMILING OR INEXPRESSIVE
BREATHING: NORMAL
FACIALEXPRESSION: SAD, FRIGHTENED, FROWN
BODYLANGUAGE: TENSE, DISTRESSED PACING, FIDGETING
BREATHING: NORMAL
CONSOLABILITY: NO NEED TO CONSOLE
BODYLANGUAGE: TENSE, DISTRESSED PACING, FIDGETING
BREATHING: NORMAL
BODYLANGUAGE: TENSE, DISTRESSED PACING, FIDGETING
FACIALEXPRESSION: SAD, FRIGHTENED, FROWN

## 2025-01-10 NOTE — PROGRESS NOTES
Physical Therapy  DATE: 1/10/2025    NAME: Jose Robison  MRN: [id number]   : 1935    Patient not seen this date for Physical Therapy due to:      [x] Cancel by RN or physician due to: patient on Precedex, not appropriate today, will continue to pursue eval when appropriate    [] Hemodialysis    [] Critical Lab Value Level     [] Blood transfusion in progress    [] Acute or unstable cardiovascular status   _MAP < 55 or more than >115  _HR < 40 or > 130    [] Acute or unstable pulmonary status   -FiO2 > 60%   _RR < 5 or >40    _O2 sats < 85%    [] Strict Bedrest    [] Off Unit for surgery or procedure    [] Off Unit for testing       [] Pending imaging to R/O fracture    [] Refusal by Patient      [] Other      [] PT being discontinued at this time. Patient independent. No further needs.     [] PT being discontinued at this time as the patient has been transferred to hospice care. No further needs.      Shandra Mccarthy, PT

## 2025-01-10 NOTE — PROGRESS NOTES
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  He agitated now back on Precedex 0.4.  He has QTc at 480.  He did not receive his Seroquel dose this morning..  He still not eating.  He is bedridden.  Daughter-in-law and wife at bedside.  CT brain done yesterday by neuro    Examination:    Vitals: /72   Pulse 67   Temp 97.4 °F (36.3 °C) (Axillary)   Resp 22   Ht 1.676 m (5' 6\")   Wt 91.6 kg (202 lb)   SpO2 95%   BMI 32.60 kg/m²   SpO2  Av.5 %  Min: 89 %  Max: 100 %  General appearance: alert confused and cooperative with exam  Neck: No JVD  Lungs: Decreased breath sound no crackles or wheezing  Heart: regular rate and rhythm, S1, S2 normal, no gallop  Abdomen: Soft, non tender, + BS  Extremities: no cyanosis or clubbing. No significant edema left lower extremity wound/burn    LABs:    CBC:   Recent Labs     25  0638 01/10/25  1555   WBC 8.5 6.7   HGB 11.3* 10.7*   HCT 37.4* 36.0*    182     BMP:   Recent Labs     25  0638 01/10/25  1555    142   K 4.2 4.3   CO2 26 31   BUN 32* 25*   CREATININE 1.6* 1.3*   LABGLOM 40* 53*   GLUCOSE 104 112     PT/INR: No results for input(s): \"PROTIME\", \"INR\" in the last 72 hours.  APTT:No results for input(s): \"APTT\" in the last 72 hours.  LIVER PROFILE:  Recent Labs     25  0515 25  0638   AST 36 46   ALT 19 16       Radiology:    Chest x-ray   1.  Moderate pulmonary vascular congestion with perihilar to infrahilar  opacities suggesting edema.     2.  Retrocardiac regions have reduced sensitivity and summation but likely  additional opacities.  At least small amounts of pleural effusion are present  and could have basilar atelectasis    .  Chest x-ray 2025        CT brain 1/9  Atrophy without acute intracranial abnormality     Impression/recommendations:    Acute hypoxic respiratory insufficiency/

## 2025-01-10 NOTE — PROGRESS NOTES
[facility] Neurology   IN-PATIENT SERVICE      NEUROLOGY PROGRESS  NOTE                Interval History:     Lethargic currently.  Back on Precedex infusion.  Family members at bedside.    History of Present Illness:     The patient is a 89 y.o. male who presents with Wound Check (Left leg, pt's wife and pt very poor historians. Wife states she noticed leg swelling \"a few days ago\" and that pt has dementia. Pt states he is not sure what caused wound on left leg. Pt's wife states that he does wander over night and could of possibly burned leg. )  . The patient was seen and examined and the chart was reviewed.  Patient initially presenting to the hospital due to what was described as a wound on the left lower extremity on 1/6.  Patient resides at home with wife.  Reportedly has history of dementia and is a poor historian.  During his hospitalization he has had significant delirium, sundowning at night requiring antipsychotics and even Precedex infusion.  Currently in the medical ICU being treated with Precedex which actually has been weaned off over the last 24 hours.  During the day today has been fairly calm and somnolent as per nursing report.  Continues to have a one-to-one sitter.  Patient is very hard of hearing as is his wife reportedly.  He has history of a flutter/A-fib not compliant on anticoagulation.  Currently being treated for left lower extremity cellulitis as well as acute delirium.     At this time patient is awake alert to self.  Able to follow commands with multiple prompts.  He is very hard of hearing.  Speech is dysarthric due to dry mouth.  He has not really eaten or drank much today.  Has been off of Precedex sedation today.  He received Seroquel overnight.  Pulmonology has been consulted for critical care management.  No family numbers at bedside.    Physical Exam:   /75   Pulse 79   Temp 97.4 °F (36.3 °C) (Axillary)   Resp 21   Ht 1.676 m (5' 6\")   Wt 91.6 kg (202 lb)   SpO2 95%    BMI 32.60 kg/m²   Temp (24hrs), Av.3 °F (36.3 °C), Min:97 °F (36.1 °C), Max:97.4 °F (36.3 °C)      Neurological examination:    Mental status    Alert and oriented to self; following basic commands with repeated prompting; speech is very dysarthric.  Impulsive.      Cranial nerves    II - visual fields intact to confrontation; pupils reactive  III, IV, VI - extraocular muscles intact; no LEIGH; no nystagmus; no ptosis   V - normal facial sensation                                                               VII - normal facial symmetry                                                             VIII - intact hearing                                                                             IX, X - symmetrical palate elevation                                               XI - symmetrical shoulder shrug                                                       XII - midline tongue without atrophy or fasciculation      Motor function  Strength: Moves all 4 extremities symmetrically  Normal bulk and tone.   Mild postural tremor present with bilateral upper extremities                Sensory function Withdraws to pain in all 4 extremities symmetrically      Cerebellar Intact finger-nose-finger testing.      Reflex function 1/4 symmetric throughout . Downgoing plantar response bilaterally. (-)Marroquin's sign bilaterally    Gait                  Not assessed due to high fall risk           Diagnostics:      Laboratory Testing:  CBC:   Recent Labs     25  0515 25  0638   WBC 11.1 8.5   HGB 11.5* 11.3*    202     BMP:    Recent Labs     25  0515 25  0638    142   K 4.1 4.2    104   CO2 25 26   BUN 35* 32*   CREATININE 1.5* 1.6*   GLUCOSE 92 104         Lab Results   Component Value Date    CHOL 139 2017    HDL 35 (L) 2023    TRIG 169 (H) 2017    ALT 16 2025    AST 46 2025    TSH 2.39 2025    INR 1.2 2016    GLUF 95 2021

## 2025-01-10 NOTE — PLAN OF CARE
Problem: Safety - Adult  Goal: Free from fall injury  1/10/2025 0328 by Sergio Lei RN  Outcome: Progressing  1/9/2025 1823 by Danna Flores RN  Outcome: Progressing     Problem: Chronic Conditions and Co-morbidities  Goal: Patient's chronic conditions and co-morbidity symptoms are monitored and maintained or improved  1/10/2025 0328 by Sergio Lei RN  Outcome: Progressing  1/9/2025 1823 by Danna Flores, RN  Outcome: Progressing     Problem: Discharge Planning  Goal: Discharge to home or other facility with appropriate resources  1/10/2025 0328 by Sergio Lei RN  Outcome: Progressing  1/9/2025 1823 by Danna Flores RN  Outcome: Progressing     Problem: Pain  Goal: Verbalizes/displays adequate comfort level or baseline comfort level  Outcome: Progressing     Problem: Safety - Medical Restraint  Goal: Remains free of injury from restraints (Restraint for Interference with Medical Device)  Description: INTERVENTIONS:  1. Determine that other, less restrictive measures have been tried or would not be effective before applying the restraint  2. Evaluate the patient's condition at the time of restraint application  3. Inform patient/family regarding the reason for restraint  4. Q2H: Monitor safety, psychosocial status, comfort, nutrition and hydration  Outcome: Progressing     Problem: Skin/Tissue Integrity  Goal: Absence of new skin breakdown  Description: 1.  Monitor for areas of redness and/or skin breakdown  2.  Assess vascular access sites hourly  3.  Every 4-6 hours minimum:  Change oxygen saturation probe site  4.  Every 4-6 hours:  If on nasal continuous positive airway pressure, respiratory therapy assess nares and determine need for appliance change or resting period.  Outcome: Progressing

## 2025-01-10 NOTE — PLAN OF CARE
Problem: Safety - Adult  Goal: Free from fall injury  1/10/2025 1317 by Jessica Delarosa RN  Outcome: Progressing  1/10/2025 0328 by Serigo Lei RN  Outcome: Progressing     Problem: Discharge Planning  Goal: Discharge to home or other facility with appropriate resources  1/10/2025 1317 by Jessica Delarosa RN  Outcome: Progressing  Flowsheets (Taken 1/10/2025 0800)  Discharge to home or other facility with appropriate resources: Identify barriers to discharge with patient and caregiver  1/10/2025 0328 by Sergio Lei RN  Outcome: Progressing     Problem: Pain  Goal: Verbalizes/displays adequate comfort level or baseline comfort level  1/10/2025 1317 by Jessica Delarosa RN  Outcome: Progressing  1/10/2025 0328 by Sergio Lei RN  Outcome: Progressing     Problem: Safety - Medical Restraint  Goal: Remains free of injury from restraints (Restraint for Interference with Medical Device)  Description: INTERVENTIONS:  1. Determine that other, less restrictive measures have been tried or would not be effective before applying the restraint  2. Evaluate the patient's condition at the time of restraint application  3. Inform patient/family regarding the reason for restraint  4. Q2H: Monitor safety, psychosocial status, comfort, nutrition and hydration  1/10/2025 1317 by Jessica Delarosa RN  Outcome: Progressing  1/10/2025 0328 by Sergio Lei RN  Outcome: Progressing     Problem: Skin/Tissue Integrity  Goal: Absence of new skin breakdown  Description: 1.  Monitor for areas of redness and/or skin breakdown  2.  Assess vascular access sites hourly  3.  Every 4-6 hours minimum:  Change oxygen saturation probe site  4.  Every 4-6 hours:  If on nasal continuous positive airway pressure, respiratory therapy assess nares and determine need for appliance change or resting period.  1/10/2025 1317 by Jessica Delarosa RN  Outcome: Progressing  1/10/2025 0328 by Sergio Lei RN  Outcome:

## 2025-01-10 NOTE — PROGRESS NOTES
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  No problems overnight.  Denies chest pain, angina, and dyspnea.  Denies abdominal pain.  Tolerating diet.  No nausea or vomiting.    Admit Date:  1/6/2025    Patient Seen, Chart, Labs, Radiology studies, and Consults reviewed.    Objective:   BP (!) 99/56   Pulse 78   Temp 97.4 °F (36.3 °C) (Axillary)   Resp 15   Ht 1.676 m (5' 6\")   Wt 91.6 kg (202 lb)   SpO2 95%   BMI 32.60 kg/m²     Intake/Output Summary (Last 24 hours) at 1/10/2025 1243  Last data filed at 1/10/2025 0544  Gross per 24 hour   Intake 1616.7 ml   Output 2150 ml   Net -533.3 ml     General appearance - alert, well appearing, and in no distress and oriented to person, place, and time  Heart:  RRR, no murmurs, gallops, or rubs, extrasystoles.  Lungs:  CTA bilaterally, no wheeze, rales or rhonchi, good air entry, good respiratory effort  Abd: bowel sounds present, nontender, nondistended, no masses, no rigidity, no guarding, no peritoneal signs.  Integumentary: Skin good color, good turgor, no rashes, no acute lesions  Upper Extrem:  No clubbing bilateral hands, no cyanosis or edema  Lower Extrem:no cyanosis or edema, no calf tenderness to lower extremities  Neurological - alert, oriented, normal speech, no focal findings or movement disorder noted, neck supple without rigidity, motor and sensory grossly normal bilaterally      Medications:    atorvastatin  20 mg Oral Daily    metoprolol succinate  50 mg Oral Daily    rivastigmine  1 patch TransDERmal Daily    bumetanide  1 mg Oral Daily    ceFAZolin  1,000 mg IntraVENous Q8H    sodium chloride flush  5-40 mL IntraVENous 2 times per day    QUEtiapine  25 mg Oral Daily    QUEtiapine  50 mg Oral Nightly    aspirin  81 mg Oral Daily     follow along          Sepideh Gresham MD, MD 1/10/2025 12:43 PM

## 2025-01-10 NOTE — PROGRESS NOTES
Occupational Therapy  [facility]  Occupational Therapy Not Seen Note    Patient not available for Occupational Therapy due to:    [] Testing:    [] Hemodialysis    [] Cancelled by RN:    []Refusal by Patient:    [] Surgery:     [] Intubation:     [] Pain Medication:    [] Sedation:     [] Spine Precautions :    [x] Medical Instability: Pt with 1:1 sitter and on precedex    [] Other:    Klaudia Newman, OTR/L

## 2025-01-10 NOTE — PROGRESS NOTES
Subjective: Agitated overnight requiring Precedex. Has not been able to take AM medications. Not eating or drinking much.     VS: /71   Pulse 91   Temp 97.4 °F (36.3 °C) (Oral)   Resp 12   Ht 1.676 m (5' 6\")   Wt 91.6 kg (202 lb)   SpO2 100%   BMI 32.60 kg/m²     Wt:     I/O: In: 1616.7 [I.V.:1368]  Out: 3325 [Urine:3325]    Monitor:  Atrial flutter with controlled vrate    Meds: Scheduled Meds:   atorvastatin  20 mg Oral Daily    metoprolol succinate  50 mg Oral Daily    rivastigmine  1 patch TransDERmal Daily    bumetanide  1 mg Oral Daily    ceFAZolin  1,000 mg IntraVENous Q8H    sodium chloride flush  5-40 mL IntraVENous 2 times per day    QUEtiapine  25 mg Oral Daily    QUEtiapine  50 mg Oral Nightly    aspirin  81 mg Oral Daily    clopidogrel  75 mg Oral Daily    morphine  4 mg IntraVENous Once    miconazole   Topical BID    sodium chloride flush  5-40 mL IntraVENous 2 times per day    enoxaparin  40 mg SubCUTAneous Daily     Continuous Infusions:   sodium chloride      sodium chloride      dexmedeTOMIDine (PRECEDEX) 400 mcg in sodium chloride 0.9 % 100 mL infusion 0.2 mcg/kg/hr (01/10/25 0114)     PRN Meds:.sodium chloride flush, sodium chloride, sodium chloride flush, sodium chloride, acetaminophen, ondansetron **OR** ondansetron, haloperidol lactate     Exam:General Appearance: confused, NAD  Skin: warm, dry  Pulmonary/Chest: diminished at bases anteriorly  Cardiovascular: irregular, negative JVD  Extremities: trivial right peripheral edema    Labs:     CBC with Differential:    Lab Results   Component Value Date/Time    WBC 8.5 01/09/2025 06:38 AM    RBC 4.22 01/09/2025 06:38 AM    HGB 11.3 01/09/2025 06:38 AM    HCT 37.4 01/09/2025 06:38 AM     01/09/2025 06:38 AM     09/23/2011 07:51 AM    MCV 88.6 01/09/2025 06:38 AM    MCH 26.8 01/09/2025 06:38 AM    MCHC 30.2 01/09/2025 06:38 AM    RDW 16.4 01/09/2025 06:38 AM    LYMPHOPCT 14 01/06/2025 03:10 PM    MONOPCT 11 01/06/2025

## 2025-01-10 NOTE — PROGRESS NOTES
Patient agitated,  pulling nasal cannula off. Attempting to put back on and patient is hitting  staff. Precedex has been restarted.

## 2025-01-10 NOTE — PROGRESS NOTES
End Of Shift Note  [facility]  Summary of shift: VSS. Patient has dementia. Initially compliant with patient care. As the night progressed, patient became more agitated (Sundowners?), pulling at nasal cannula/walker and fighting staff. Precedex restarted at 0115AM. Walker in place for retention: 1000 ml UO, No BM. Sitter remains at bedside. Dressing change completed this morning    Vitals:    Vitals:    01/09/25 1700 01/09/25 1800 01/09/25 1900 01/10/25 0249   BP: (!) 143/68 (!) 118/107 (!) 152/89    Pulse: 75 (!) 101 91 84   Resp: 26 15 14 15   Temp:       TempSrc:       SpO2:  96%  100%   Weight:       Height:            I&O:   Intake/Output Summary (Last 24 hours) at 1/10/2025 0409  Last data filed at 1/9/2025 1837  Gross per 24 hour   Intake 1616.7 ml   Output 2325 ml   Net -708.3 ml       Resp Status: 4L while sleeping, 2L when awake    Ventilator Settings:     / / /     Critical Care IV infusions:   sodium chloride      sodium chloride      dexmedeTOMIDine (PRECEDEX) 400 mcg in sodium chloride 0.9 % 100 mL infusion 0.2 mcg/kg/hr (01/10/25 0114)        LDA:   Urinary Catheter 01/07/25 Walker;Coude (Active)   Number of days: 3       Midline 01/08/25 Right Basilic (Active)   Number of days: 1       Wound 01/06/25 Pretibial Left;Anterior (Active)   Number of days: 3

## 2025-01-11 LAB
ANION GAP SERPL CALCULATED.3IONS-SCNC: 10 MMOL/L (ref 9–16)
BASOPHILS # BLD: 0 K/UL (ref 0–0.2)
BASOPHILS NFR BLD: 0 %
BUN SERPL-MCNC: 24 MG/DL (ref 8–23)
CALCIUM SERPL-MCNC: 9.1 MG/DL (ref 8.8–10.2)
CHLORIDE SERPL-SCNC: 100 MMOL/L (ref 98–107)
CO2 SERPL-SCNC: 30 MMOL/L (ref 20–31)
CREAT SERPL-MCNC: 1.3 MG/DL (ref 0.7–1.2)
EOSINOPHIL # BLD: 0.32 K/UL (ref 0–0.4)
EOSINOPHILS RELATIVE PERCENT: 5 % (ref 1–4)
ERYTHROCYTE [DISTWIDTH] IN BLOOD BY AUTOMATED COUNT: 16.1 % (ref 11.8–14.4)
GFR, ESTIMATED: 53 ML/MIN/1.73M2
GLUCOSE SERPL-MCNC: 118 MG/DL (ref 82–115)
HCT VFR BLD AUTO: 38.6 % (ref 40.7–50.3)
HGB BLD-MCNC: 11.6 G/DL (ref 13–17)
IMM GRANULOCYTES # BLD AUTO: 0 K/UL (ref 0–0.3)
IMM GRANULOCYTES NFR BLD: 0 %
LYMPHOCYTES NFR BLD: 0.7 K/UL (ref 1–4.8)
LYMPHOCYTES RELATIVE PERCENT: 11 % (ref 24–44)
MCH RBC QN AUTO: 26.9 PG (ref 25.2–33.5)
MCHC RBC AUTO-ENTMCNC: 30.1 G/DL (ref 28.4–34.8)
MCV RBC AUTO: 89.4 FL (ref 82.6–102.9)
MONOCYTES NFR BLD: 0.45 K/UL (ref 0.2–0.8)
MONOCYTES NFR BLD: 7 % (ref 1–7)
MORPHOLOGY: ABNORMAL
MORPHOLOGY: ABNORMAL
NEUTROPHILS NFR BLD: 77 % (ref 36–66)
NEUTS SEG NFR BLD: 4.93 K/UL (ref 1.8–7.7)
NRBC BLD-RTO: 0 PER 100 WBC
PLATELET # BLD AUTO: 181 K/UL (ref 138–453)
PMV BLD AUTO: 9.5 FL (ref 8.1–13.5)
POTASSIUM SERPL-SCNC: 4.2 MMOL/L (ref 3.7–5.3)
RBC # BLD AUTO: 4.32 M/UL (ref 4.21–5.77)
SODIUM SERPL-SCNC: 140 MMOL/L (ref 136–145)
WBC OTHER # BLD: 6.4 K/UL (ref 3.5–11.3)

## 2025-01-11 PROCEDURE — 99233 SBSQ HOSP IP/OBS HIGH 50: CPT | Performed by: FAMILY MEDICINE

## 2025-01-11 PROCEDURE — 6370000000 HC RX 637 (ALT 250 FOR IP): Performed by: PSYCHIATRY & NEUROLOGY

## 2025-01-11 PROCEDURE — 99231 SBSQ HOSP IP/OBS SF/LOW 25: CPT | Performed by: PSYCHIATRY & NEUROLOGY

## 2025-01-11 PROCEDURE — 6360000002 HC RX W HCPCS: Performed by: INTERNAL MEDICINE

## 2025-01-11 PROCEDURE — 6360000002 HC RX W HCPCS: Performed by: FAMILY MEDICINE

## 2025-01-11 PROCEDURE — 2000000000 HC ICU R&B

## 2025-01-11 PROCEDURE — 6370000000 HC RX 637 (ALT 250 FOR IP): Performed by: NURSE PRACTITIONER

## 2025-01-11 PROCEDURE — 2500000003 HC RX 250 WO HCPCS: Performed by: FAMILY MEDICINE

## 2025-01-11 PROCEDURE — 36415 COLL VENOUS BLD VENIPUNCTURE: CPT

## 2025-01-11 PROCEDURE — 85025 COMPLETE CBC W/AUTO DIFF WBC: CPT

## 2025-01-11 PROCEDURE — 2580000003 HC RX 258: Performed by: INTERNAL MEDICINE

## 2025-01-11 PROCEDURE — 80048 BASIC METABOLIC PNL TOTAL CA: CPT

## 2025-01-11 PROCEDURE — 2580000003 HC RX 258: Performed by: FAMILY MEDICINE

## 2025-01-11 PROCEDURE — 6370000000 HC RX 637 (ALT 250 FOR IP): Performed by: INTERNAL MEDICINE

## 2025-01-11 RX ORDER — FUROSEMIDE 10 MG/ML
40 INJECTION INTRAMUSCULAR; INTRAVENOUS ONCE
Status: COMPLETED | OUTPATIENT
Start: 2025-01-11 | End: 2025-01-11

## 2025-01-11 RX ADMIN — DEXTROSE MONOHYDRATE: 50 INJECTION, SOLUTION INTRAVENOUS at 09:25

## 2025-01-11 RX ADMIN — DEXMEDETOMIDINE 0.8 MCG/KG/HR: 100 INJECTION, SOLUTION INTRAVENOUS at 15:31

## 2025-01-11 RX ADMIN — QUETIAPINE FUMARATE 50 MG: 25 TABLET ORAL at 20:48

## 2025-01-11 RX ADMIN — METOPROLOL SUCCINATE 50 MG: 50 TABLET, EXTENDED RELEASE ORAL at 09:30

## 2025-01-11 RX ADMIN — QUETIAPINE FUMARATE 25 MG: 25 TABLET ORAL at 09:30

## 2025-01-11 RX ADMIN — ENOXAPARIN SODIUM 40 MG: 100 INJECTION SUBCUTANEOUS at 09:31

## 2025-01-11 RX ADMIN — CEFAZOLIN 1000 MG: 1 INJECTION, POWDER, FOR SOLUTION INTRAMUSCULAR; INTRAVENOUS at 13:06

## 2025-01-11 RX ADMIN — ATORVASTATIN CALCIUM 20 MG: 20 TABLET, FILM COATED ORAL at 09:30

## 2025-01-11 RX ADMIN — MICONAZOLE NITRATE: 20 CREAM TOPICAL at 20:52

## 2025-01-11 RX ADMIN — DEXMEDETOMIDINE 0.8 MCG/KG/HR: 100 INJECTION, SOLUTION INTRAVENOUS at 09:39

## 2025-01-11 RX ADMIN — DEXMEDETOMIDINE 0.8 MCG/KG/HR: 100 INJECTION, SOLUTION INTRAVENOUS at 03:27

## 2025-01-11 RX ADMIN — DEXMEDETOMIDINE 0.8 MCG/KG/HR: 100 INJECTION, SOLUTION INTRAVENOUS at 19:45

## 2025-01-11 RX ADMIN — CEFAZOLIN 1000 MG: 1 INJECTION, POWDER, FOR SOLUTION INTRAMUSCULAR; INTRAVENOUS at 20:47

## 2025-01-11 RX ADMIN — FUROSEMIDE 40 MG: 10 INJECTION, SOLUTION INTRAMUSCULAR; INTRAVENOUS at 14:24

## 2025-01-11 RX ADMIN — CEFAZOLIN 1000 MG: 1 INJECTION, POWDER, FOR SOLUTION INTRAMUSCULAR; INTRAVENOUS at 05:32

## 2025-01-11 RX ADMIN — MICONAZOLE NITRATE: 20 CREAM TOPICAL at 09:45

## 2025-01-11 RX ADMIN — SODIUM CHLORIDE, PRESERVATIVE FREE 10 ML: 5 INJECTION INTRAVENOUS at 09:45

## 2025-01-11 RX ADMIN — CLOPIDOGREL BISULFATE 75 MG: 75 TABLET ORAL at 09:30

## 2025-01-11 ASSESSMENT — PAIN SCALES - PAIN ASSESSMENT IN ADVANCED DEMENTIA (PAINAD)
TOTALSCORE: 0
BREATHING: NORMAL
BREATHING: NORMAL
CONSOLABILITY: NO NEED TO CONSOLE
FACIALEXPRESSION: SMILING OR INEXPRESSIVE
BODYLANGUAGE: RELAXED
CONSOLABILITY: NO NEED TO CONSOLE
CONSOLABILITY: UNABLE TO CONSOLE, DISTRACT OR REASSURE
FACIALEXPRESSION: SMILING OR INEXPRESSIVE
FACIALEXPRESSION: SMILING OR INEXPRESSIVE
BODYLANGUAGE: RELAXED
FACIALEXPRESSION: SMILING OR INEXPRESSIVE
TOTALSCORE: 0
BREATHING: NORMAL
CONSOLABILITY: NO NEED TO CONSOLE
TOTALSCORE: 0
FACIALEXPRESSION: SMILING OR INEXPRESSIVE
BODYLANGUAGE: RELAXED
TOTALSCORE: 0
CONSOLABILITY: NO NEED TO CONSOLE
BODYLANGUAGE: RELAXED
BODYLANGUAGE: RELAXED
BODYLANGUAGE: RIGID, FISTS CLENCHED, KNEES UP, PUSHING/PULLING AWAY, STRIKES OUT
CONSOLABILITY: NO NEED TO CONSOLE
BREATHING: NORMAL
TOTALSCORE: 0
CONSOLABILITY: NO NEED TO CONSOLE
BREATHING: OCCASIONAL LABORED BREATHING, SHORT PERIOD OF HYPERVENTILATION
BODYLANGUAGE: RELAXED
CONSOLABILITY: NO NEED TO CONSOLE
TOTALSCORE: 0
FACIALEXPRESSION: SMILING OR INEXPRESSIVE
BREATHING: NORMAL
BODYLANGUAGE: RELAXED
TOTALSCORE: 1
FACIALEXPRESSION: SMILING OR INEXPRESSIVE
BREATHING: NORMAL
TOTALSCORE: 7
FACIALEXPRESSION: SMILING OR INEXPRESSIVE
BODYLANGUAGE: RELAXED
FACIALEXPRESSION: SAD, FRIGHTENED, FROWN
BREATHING: NORMAL
BREATHING: NORMAL
CONSOLABILITY: NO NEED TO CONSOLE
NEGVOCALIZATION: OCCASIONAL MOAN/GROAN, LOW SPEECH, NEGATIVE/DISAPPROVING QUALITY
BODYLANGUAGE: RELAXED
CONSOLABILITY: NO NEED TO CONSOLE
FACIALEXPRESSION: SAD, FRIGHTENED, FROWN
BREATHING: NORMAL

## 2025-01-11 ASSESSMENT — PAIN SCALES - GENERAL
PAINLEVEL_OUTOF10: 7
PAINLEVEL_OUTOF10: 0
PAINLEVEL_OUTOF10: 0
PAINLEVEL_OUTOF10: 1
PAINLEVEL_OUTOF10: 0

## 2025-01-11 NOTE — PROGRESS NOTES
Comprehensive Nutrition Assessment    Type and Reason for Visit:  Consult (Tube feeding order and management)    Nutrition Recommendations/Plan:   NPO diet  Recommend Osmolite 1.2 Alec goal rate 70 mL/hr. Start at 15 mL/hr and advance by 15 mL every 6 hours until goal rate is reached. Water flush 30 mL every 4 hours  Monitor tube feeding rate, tolerance, GI function, labs and signs of refeeding syndrome     Malnutrition Assessment:  Malnutrition Status:  At risk for malnutrition (01/11/25 0996)        Nutrition Assessment:    Patient admission is related to CHF, open leg wound on left side and dementia. Patient has a medical history for ARF, CHF, and heart attack. Patient may not have an official diagnosis for dementia but patient has sundowners, with agressive behavior and hallucinations. Patient has been pulling on lines, kicking and scratching nurses. Patient on Precedex for agitation. Patient has not been eating due to altered mental status. Patient had a nasogastric tube placed yesterday (1/10) and tube feeding consult has been ordered. Recommend Osmolite 1.2 Alec goal rate 70 mL/hr (1680 mL total volume). Water flush 30 mL every 4 hours. Advance tube feeding rate by 15 mL/hr every 6 hours. Monitor for refeeding syndrome, tube feeding rate, GI function and labs.    Nutrition Related Findings:    Edema: +1 BUE, +1 BLE. Active bowel sounds. Active bowel sounds. Sundowning, hallucinations, hard of hearing, agressive behavior, pulling on lines. Precedex. NG tube for tube feedings         Current Nutrition Intake & Therapies:    Average Meal Intake: NPO  Average Supplements Intake: NPO  ADULT TUBE FEEDING; Nasogastric; Standard without Fiber; Continuous; 15; Yes; 15; Q 6 hours; 70; 30; Q 4 hours  Current Tube Feeding (TF) Orders:  Feeding Route: Nasogastric  Formula: Standard without Fiber  Schedule: Continuous  Feeding Regimen: Osmolite 1.2 Alec goal rate 70 mL/hr (1680 mL total volume)  Additives/Modulars:

## 2025-01-11 NOTE — PLAN OF CARE
Problem: Safety - Adult  Goal: Free from fall injury  1/11/2025 1137 by Jessica Delarosa RN  Outcome: Progressing  1/11/2025 0552 by Sergio Lei RN  Outcome: Progressing     Problem: Pain  Goal: Verbalizes/displays adequate comfort level or baseline comfort level  1/11/2025 1137 by Jessica Delarosa RN  Outcome: Progressing  1/11/2025 0552 by Sergio Lei RN  Outcome: Progressing     Problem: Safety - Medical Restraint  Goal: Remains free of injury from restraints (Restraint for Interference with Medical Device)  Description: INTERVENTIONS:  1. Determine that other, less restrictive measures have been tried or would not be effective before applying the restraint  2. Evaluate the patient's condition at the time of restraint application  3. Inform patient/family regarding the reason for restraint  4. Q2H: Monitor safety, psychosocial status, comfort, nutrition and hydration  1/11/2025 1137 by Jessica Delarosa RN  Outcome: Progressing  1/11/2025 0552 by Sergio Lei RN  Outcome: Progressing     Problem: Skin/Tissue Integrity  Goal: Absence of new skin breakdown  Description: 1.  Monitor for areas of redness and/or skin breakdown  2.  Assess vascular access sites hourly  3.  Every 4-6 hours minimum:  Change oxygen saturation probe site  4.  Every 4-6 hours:  If on nasal continuous positive airway pressure, respiratory therapy assess nares and determine need for appliance change or resting period.  1/11/2025 1137 by Jessica Delarosa RN  Outcome: Progressing  1/11/2025 0552 by Sergio Lei RN  Outcome: Progressing     Problem: ABCDS Injury Assessment  Goal: Absence of physical injury  1/11/2025 1137 by Jessica Delarosa RN  Outcome: Progressing  Flowsheets (Taken 1/11/2025 1136)  Absence of Physical Injury: Implement safety measures based on patient assessment  1/11/2025 0552 by Sergio Lei RN  Outcome: Progressing     Problem: Chronic Conditions and Co-morbidities  Goal: Patient's

## 2025-01-11 NOTE — PROGRESS NOTES
Progress Note    Subjective:  New onset diastolic congestive heart failure  Acute hypoxic respiratory failure  Dementia with psychosis  A-fib  The patient is awake and alert.  No problems overnight.  Denies chest pain, angina, and dyspnea.  Denies abdominal pain.  Tolerating diet.  No nausea or vomiting.    Admit Date:  1/6/2025    Patient Seen, Chart, Labs, Radiology studies, and Consults reviewed.    Objective:   /66   Pulse 66   Temp 98.1 °F (36.7 °C)   Resp 11   Ht 1.676 m (5' 6\")   Wt 91.6 kg (202 lb)   SpO2 96%   BMI 32.60 kg/m²     Intake/Output Summary (Last 24 hours) at 1/11/2025 1741  Last data filed at 1/11/2025 1737  Gross per 24 hour   Intake 2442.7 ml   Output 2875 ml   Net -432.3 ml     General appearance - alert, well appearing, and in no distress and oriented to person, place, and time  Heart:  RRR, no murmurs, gallops, or rubs, extrasystoles.  Lungs:  CTA bilaterally, no wheeze, rales or rhonchi, good air entry, good respiratory effort  Abd: bowel sounds present, nontender, nondistended, no masses, no rigidity, no guarding, no peritoneal signs.  Integumentary: Skin good color, good turgor, no rashes, no acute lesions  Upper Extrem:  No clubbing bilateral hands, no cyanosis or edema  Lower Extrem:no cyanosis or edema, no calf tenderness to lower extremities  Neurological - alert, oriented, normal speech, no focal findings or movement disorder noted, neck supple without rigidity, motor and sensory grossly normal bilaterally      Medications:    [START ON 1/13/2025] bumetanide  1 mg Oral Once per day on Monday Wednesday Friday    atorvastatin  20 mg Oral Daily    metoprolol succinate  50 mg Oral Daily    rivastigmine  1 patch TransDERmal Daily    ceFAZolin  1,000 mg IntraVENous Q8H    sodium chloride flush  5-40 mL IntraVENous 2 times per day    QUEtiapine  25 mg Oral Daily    QUEtiapine  50 mg Oral Nightly    aspirin  81 mg Oral Daily    clopidogrel  75 mg Oral Daily    morphine  4 mg  closely  Will need placement in dementia unit    Will follow along the        Sepideh Gresham MD, MD 1/11/2025 5:41 PM

## 2025-01-11 NOTE — PROGRESS NOTES
Rectal temp from rectal probe 36.2 C, decreasing rico huggar setting from 43C to 38C. Patient is intermittently restless after night time med administration (seroquel 50mg), precedex remains at 0.8 mcg/kg/hr

## 2025-01-11 NOTE — PROGRESS NOTES
End Of Shift Note  [place] [facility]  Summary of shift: Patient resistant to nursing care, precedex titrated as needed. Rectal temp at start of shift was 35.3 C, Arpit huggar and rectal temp probe placed. Arpit huggar off at 0600, Rectal temp 37.2 C. Patient is sedated with intermittent restlessness, hallucinations, pulling at lines. Levine in place: 2400 ml UO, No BM.     Vitals:    Vitals:    01/11/25 0030 01/11/25 0100 01/11/25 0130 01/11/25 0200   BP: 105/72  114/79 115/73   Pulse: 88 75 75 63   Resp: 24 16 15 18   Temp: 97.7 °F (36.5 °C) 97.9 °F (36.6 °C) 98.1 °F (36.7 °C) 98.1 °F (36.7 °C)   TempSrc:       SpO2: 96% 99% 99% 96%   Weight:       Height:            I&O:   Intake/Output Summary (Last 24 hours) at 1/11/2025 0553  Last data filed at 1/10/2025 2142  Gross per 24 hour   Intake 655.27 ml   Output 1300 ml   Net -644.73 ml       Resp Status:     Ventilator Settings:     / / /     Critical Care IV infusions:   dextrose 50 mL/hr at 01/10/25 1842    sodium chloride      sodium chloride      dexmedeTOMIDine (PRECEDEX) 400 mcg in sodium chloride 0.9 % 100 mL infusion 0.8 mcg/kg/hr (01/11/25 0327)        LDA:   NG/OG/NJ/NE Tube Nasogastric 16 fr Right nostril (Active)   Number of days: 0       Urinary Catheter 01/07/25 Lveine;Coude (Active)   Number of days: 4       Midline 01/08/25 Right Basilic (Active)   Number of days: 2       Wound 01/06/25 Pretibial Left;Anterior (Active)   Number of days: 4

## 2025-01-11 NOTE — PROGRESS NOTES
Occupational Therapy  DATE: 2025    NAME: Jose Robison  MRN: [id number]   : 1935    Patient not seen this date for Occupational Therapy due to:      [x] Cancel by RN or physician due to: (RN Arelis requesting to hold therapy this date as pt is \"demented and on precedex\".)     [] Hemodialysis    [] Critical Lab Value Level     [] Blood transfusion in progress    [] Acute or unstable cardiovascular status   _MAP < 55 or more than >115  _HR < 40 or > 130    [] Acute or unstable pulmonary status   -FiO2 > 60%   _RR < 5 or >40    _O2 sats < 85%    [] Strict Bedrest    [] Off Unit for surgery or procedure    [] Off Unit for testing       [] Pending imaging to R/O fracture    [] Refusal by Patient      [] Intubated    [] Other      [] OT being discontinued at this time. Patient independent. No further needs.     [] OT being discontinued at this time as the patient has been transferred to hospice care. No further needs.      Ariadna Bee, IR[place], OTR/L

## 2025-01-11 NOTE — PROGRESS NOTES
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  He agitated now back on Precedex 0.8.  He has QTc at 480.  He had NG inserted received Seroquel.  He still not eating.  He is bedridden.  Daughter-in-law at bedside     Examination:    Vitals: /88   Pulse 66   Temp 98.1 °F (36.7 °C) (Rectal)   Resp 13   Ht 1.676 m (5' 6\")   Wt 91.6 kg (202 lb)   SpO2 97%   BMI 32.60 kg/m²   SpO2  Av.3 %  Min: 82 %  Max: 100 %  General appearance: Sleepy and cooperative with exam  Neck: No JVD  Lungs: Decreased breath sound no crackles or wheezing  Heart: regular rate and rhythm, S1, S2 normal, no gallop  Abdomen: Soft, non tender, + BS  Extremities: no cyanosis or clubbing. No significant edema left lower extremity wound/burn    LABs:    CBC:   Recent Labs     01/10/25  1555 25  0437   WBC 6.7 6.4   HGB 10.7* 11.6*   HCT 36.0* 38.6*    181     BMP:   Recent Labs     01/10/25  1555 25  0437    140   K 4.3 4.2   CO2 31 30   BUN 25* 24*   CREATININE 1.3* 1.3*   LABGLOM 53* 53*   GLUCOSE 112 118*     PT/INR: No results for input(s): \"PROTIME\", \"INR\" in the last 72 hours.  APTT:No results for input(s): \"APTT\" in the last 72 hours.  LIVER PROFILE:  Recent Labs     25  0638   AST 46   ALT 16      Latest Reference Range & Units 25 15:10   NT Pro-BNP 0 - 450 pg/mL 4,715 (H)   (H): Data is abnormally high   Latest Reference Range & Units 01/10/25 15:55   NT Pro-BNP 0 - 450 pg/mL 4,320 (H)   (H): Data is abnormally high  Radiology:  Chest x-ray 1/10  Cardiomegaly with interstitial and alveolar edema, not substantially changed.       Chest x-ray   1.  Moderate pulmonary vascular congestion with perihilar to infrahilar  opacities suggesting edema.     2.  Retrocardiac regions have reduced sensitivity and summation but likely  additional opacities.  At least small

## 2025-01-11 NOTE — PROGRESS NOTES
[facility] Neurology   IN-PATIENT SERVICE      NEUROLOGY PROGRESS  NOTE                Interval History:     On Precedex sedation.  Did not sleep very well overnight.  NG tube has been placed.  On Seroquel scheduled.  Family members at bedside.    History of Present Illness:     The patient is a 89 y.o. male who presents with Wound Check (Left leg, pt's wife and pt very poor historians. Wife states she noticed leg swelling \"a few days ago\" and that pt has dementia. Pt states he is not sure what caused wound on left leg. Pt's wife states that he does wander over night and could of possibly burned leg. )  . The patient was seen and examined and the chart was reviewed.  Patient initially presenting to the hospital due to what was described as a wound on the left lower extremity on 1/6.  Patient resides at home with wife.  Reportedly has history of dementia and is a poor historian.  During his hospitalization he has had significant delirium, sundowning at night requiring antipsychotics and even Precedex infusion.  Currently in the medical ICU being treated with Precedex which actually has been weaned off over the last 24 hours.  During the day today has been fairly calm and somnolent as per nursing report.  Continues to have a one-to-one sitter.  Patient is very hard of hearing as is his wife reportedly.  He has history of a flutter/A-fib not compliant on anticoagulation.  Currently being treated for left lower extremity cellulitis as well as acute delirium.     At this time patient is awake alert to self.  Able to follow commands with multiple prompts.  He is very hard of hearing.  Speech is dysarthric due to dry mouth.  He has not really eaten or drank much today.  Has been off of Precedex sedation today.  He received Seroquel overnight.  Pulmonology has been consulted for critical care management.  No family numbers at bedside    Physical Exam:   /64   Pulse 71   Temp 97.5 °F (36.4 °C)   Resp 12   Ht

## 2025-01-11 NOTE — PLAN OF CARE
Problem: Safety - Adult  Goal: Free from fall injury  Outcome: Progressing     Problem: Chronic Conditions and Co-morbidities  Goal: Patient's chronic conditions and co-morbidity symptoms are monitored and maintained or improved  Outcome: Progressing     Problem: Discharge Planning  Goal: Discharge to home or other facility with appropriate resources  Outcome: Progressing     Problem: Pain  Goal: Verbalizes/displays adequate comfort level or baseline comfort level  Outcome: Progressing     Problem: Safety - Medical Restraint  Goal: Remains free of injury from restraints (Restraint for Interference with Medical Device)  Description: INTERVENTIONS:  1. Determine that other, less restrictive measures have been tried or would not be effective before applying the restraint  2. Evaluate the patient's condition at the time of restraint application  3. Inform patient/family regarding the reason for restraint  4. Q2H: Monitor safety, psychosocial status, comfort, nutrition and hydration  Outcome: Progressing     Problem: Skin/Tissue Integrity  Goal: Absence of new skin breakdown  Description: 1.  Monitor for areas of redness and/or skin breakdown  2.  Assess vascular access sites hourly  3.  Every 4-6 hours minimum:  Change oxygen saturation probe site  4.  Every 4-6 hours:  If on nasal continuous positive airway pressure, respiratory therapy assess nares and determine need for appliance change or resting period.  Outcome: Progressing     Problem: ABCDS Injury Assessment  Goal: Absence of physical injury  Outcome: Progressing

## 2025-01-11 NOTE — PROGRESS NOTES
Physical Therapy  DATE: 2025    NAME: Jose Robison  MRN: [id number]   : 1935    Patient not seen this date for Physical Therapy due to:      [x] Cancel by RN or physician due to: patient on Precedex, not appropriate today, will continue to pursue eval when appropriate    [] Hemodialysis    [] Critical Lab Value Level     [] Blood transfusion in progress    [] Acute or unstable cardiovascular status   _MAP < 55 or more than >115  _HR < 40 or > 130    [] Acute or unstable pulmonary status   -FiO2 > 60%   _RR < 5 or >40    _O2 sats < 85%    [] Strict Bedrest    [] Off Unit for surgery or procedure    [] Off Unit for testing       [] Pending imaging to R/O fracture    [] Refusal by Patient      [] Other      [] PT being discontinued at this time. Patient independent. No further needs.     [] PT being discontinued at this time as the patient has been transferred to hospice care. No further needs.      Deuce Martinez, PT

## 2025-01-12 ENCOUNTER — APPOINTMENT (OUTPATIENT)
Dept: GENERAL RADIOLOGY | Age: 89
DRG: 291 | End: 2025-01-12
Payer: MEDICARE

## 2025-01-12 LAB
ANION GAP SERPL CALCULATED.3IONS-SCNC: 8 MMOL/L (ref 9–16)
BASOPHILS # BLD: 0 K/UL (ref 0–0.2)
BASOPHILS NFR BLD: 0 % (ref 0–2)
BUN SERPL-MCNC: 23 MG/DL (ref 8–23)
CALCIUM SERPL-MCNC: 9.2 MG/DL (ref 8.8–10.2)
CHLORIDE SERPL-SCNC: 97 MMOL/L (ref 98–107)
CO2 SERPL-SCNC: 34 MMOL/L (ref 20–31)
CREAT SERPL-MCNC: 1.2 MG/DL (ref 0.7–1.2)
EOSINOPHIL # BLD: 0.17 K/UL (ref 0–0.44)
EOSINOPHILS RELATIVE PERCENT: 2 % (ref 1–4)
ERYTHROCYTE [DISTWIDTH] IN BLOOD BY AUTOMATED COUNT: 16.1 % (ref 11.8–14.4)
GFR, ESTIMATED: 56 ML/MIN/1.73M2
GLUCOSE SERPL-MCNC: 128 MG/DL (ref 82–115)
HCT VFR BLD AUTO: 38.9 % (ref 40.7–50.3)
HGB BLD-MCNC: 11.9 G/DL (ref 13–17)
IMM GRANULOCYTES # BLD AUTO: 0 K/UL (ref 0–0.3)
IMM GRANULOCYTES NFR BLD: 0 %
LYMPHOCYTES NFR BLD: 0.66 K/UL (ref 1.1–3.7)
LYMPHOCYTES RELATIVE PERCENT: 8 % (ref 24–43)
MCH RBC QN AUTO: 26.8 PG (ref 25.2–33.5)
MCHC RBC AUTO-ENTMCNC: 30.6 G/DL (ref 28.4–34.8)
MCV RBC AUTO: 87.6 FL (ref 82.6–102.9)
MICROORGANISM SPEC CULT: NORMAL
MICROORGANISM SPEC CULT: NORMAL
MONOCYTES NFR BLD: 1 K/UL (ref 0.1–1.2)
MONOCYTES NFR BLD: 12 % (ref 3–12)
NEUTROPHILS NFR BLD: 78 % (ref 36–65)
NEUTS SEG NFR BLD: 6.47 K/UL (ref 1.5–8.1)
NRBC BLD-RTO: 0 PER 100 WBC
PLATELET # BLD AUTO: 188 K/UL (ref 138–453)
PMV BLD AUTO: 9.7 FL (ref 8.1–13.5)
POTASSIUM SERPL-SCNC: 3.8 MMOL/L (ref 3.7–5.3)
PROCALCITONIN SERPL-MCNC: 0.13 NG/ML (ref 0–0.09)
RBC # BLD AUTO: 4.44 M/UL (ref 4.21–5.77)
SERVICE CMNT-IMP: NORMAL
SERVICE CMNT-IMP: NORMAL
SODIUM SERPL-SCNC: 139 MMOL/L (ref 136–145)
SPECIMEN DESCRIPTION: NORMAL
SPECIMEN DESCRIPTION: NORMAL
WBC OTHER # BLD: 8.3 K/UL (ref 3.5–11.3)

## 2025-01-12 PROCEDURE — 71045 X-RAY EXAM CHEST 1 VIEW: CPT

## 2025-01-12 PROCEDURE — 6370000000 HC RX 637 (ALT 250 FOR IP): Performed by: NURSE PRACTITIONER

## 2025-01-12 PROCEDURE — 80048 BASIC METABOLIC PNL TOTAL CA: CPT

## 2025-01-12 PROCEDURE — 36415 COLL VENOUS BLD VENIPUNCTURE: CPT

## 2025-01-12 PROCEDURE — 2580000003 HC RX 258: Performed by: INTERNAL MEDICINE

## 2025-01-12 PROCEDURE — 6370000000 HC RX 637 (ALT 250 FOR IP): Performed by: INTERNAL MEDICINE

## 2025-01-12 PROCEDURE — 99231 SBSQ HOSP IP/OBS SF/LOW 25: CPT | Performed by: PSYCHIATRY & NEUROLOGY

## 2025-01-12 PROCEDURE — 2500000003 HC RX 250 WO HCPCS: Performed by: FAMILY MEDICINE

## 2025-01-12 PROCEDURE — 2000000000 HC ICU R&B

## 2025-01-12 PROCEDURE — 99233 SBSQ HOSP IP/OBS HIGH 50: CPT | Performed by: FAMILY MEDICINE

## 2025-01-12 PROCEDURE — 6360000002 HC RX W HCPCS: Performed by: INTERNAL MEDICINE

## 2025-01-12 PROCEDURE — 2580000003 HC RX 258: Performed by: FAMILY MEDICINE

## 2025-01-12 PROCEDURE — 6370000000 HC RX 637 (ALT 250 FOR IP): Performed by: PSYCHIATRY & NEUROLOGY

## 2025-01-12 PROCEDURE — 84145 PROCALCITONIN (PCT): CPT

## 2025-01-12 PROCEDURE — 6360000002 HC RX W HCPCS: Performed by: FAMILY MEDICINE

## 2025-01-12 PROCEDURE — 85025 COMPLETE CBC W/AUTO DIFF WBC: CPT

## 2025-01-12 RX ORDER — QUETIAPINE FUMARATE 25 MG/1
25 TABLET, FILM COATED ORAL ONCE
Status: COMPLETED | OUTPATIENT
Start: 2025-01-12 | End: 2025-01-12

## 2025-01-12 RX ORDER — QUETIAPINE FUMARATE 25 MG/1
50 TABLET, FILM COATED ORAL DAILY
Status: DISCONTINUED | OUTPATIENT
Start: 2025-01-13 | End: 2025-01-14

## 2025-01-12 RX ORDER — QUETIAPINE FUMARATE 25 MG/1
75 TABLET, FILM COATED ORAL NIGHTLY
Status: DISCONTINUED | OUTPATIENT
Start: 2025-01-12 | End: 2025-01-14

## 2025-01-12 RX ORDER — METOPROLOL TARTRATE 25 MG/1
25 TABLET, FILM COATED ORAL 2 TIMES DAILY
Status: DISCONTINUED | OUTPATIENT
Start: 2025-01-12 | End: 2025-01-13

## 2025-01-12 RX ADMIN — QUETIAPINE FUMARATE 25 MG: 25 TABLET ORAL at 12:08

## 2025-01-12 RX ADMIN — CEFAZOLIN 1000 MG: 1 INJECTION, POWDER, FOR SOLUTION INTRAMUSCULAR; INTRAVENOUS at 04:42

## 2025-01-12 RX ADMIN — ATORVASTATIN CALCIUM 20 MG: 20 TABLET, FILM COATED ORAL at 08:33

## 2025-01-12 RX ADMIN — METOPROLOL TARTRATE 25 MG: 25 TABLET, FILM COATED ORAL at 20:07

## 2025-01-12 RX ADMIN — DEXMEDETOMIDINE 0.5 MCG/KG/HR: 100 INJECTION, SOLUTION INTRAVENOUS at 02:11

## 2025-01-12 RX ADMIN — CEFAZOLIN 1000 MG: 1 INJECTION, POWDER, FOR SOLUTION INTRAMUSCULAR; INTRAVENOUS at 13:53

## 2025-01-12 RX ADMIN — ENOXAPARIN SODIUM 40 MG: 100 INJECTION SUBCUTANEOUS at 08:34

## 2025-01-12 RX ADMIN — QUETIAPINE FUMARATE 75 MG: 25 TABLET ORAL at 20:07

## 2025-01-12 RX ADMIN — HALOPERIDOL LACTATE 6 MG: 5 INJECTION, SOLUTION INTRAMUSCULAR at 06:20

## 2025-01-12 RX ADMIN — CLOPIDOGREL BISULFATE 75 MG: 75 TABLET ORAL at 08:34

## 2025-01-12 RX ADMIN — METOPROLOL TARTRATE 25 MG: 25 TABLET, FILM COATED ORAL at 12:08

## 2025-01-12 RX ADMIN — QUETIAPINE FUMARATE 25 MG: 25 TABLET ORAL at 08:33

## 2025-01-12 RX ADMIN — MICONAZOLE NITRATE: 20 CREAM TOPICAL at 20:26

## 2025-01-12 RX ADMIN — CEFAZOLIN 1000 MG: 1 INJECTION, POWDER, FOR SOLUTION INTRAMUSCULAR; INTRAVENOUS at 20:10

## 2025-01-12 ASSESSMENT — PAIN SCALES - PAIN ASSESSMENT IN ADVANCED DEMENTIA (PAINAD)
BREATHING: NORMAL
BODYLANGUAGE: RELAXED
TOTALSCORE: 0
BREATHING: NORMAL
CONSOLABILITY: NO NEED TO CONSOLE
BODYLANGUAGE: RELAXED
TOTALSCORE: 0
FACIALEXPRESSION: SMILING OR INEXPRESSIVE
FACIALEXPRESSION: SMILING OR INEXPRESSIVE
CONSOLABILITY: NO NEED TO CONSOLE

## 2025-01-12 ASSESSMENT — PAIN SCALES - GENERAL
PAINLEVEL_OUTOF10: 0

## 2025-01-12 NOTE — PROGRESS NOTES
Physical Therapy  DATE: 2025    NAME: Jose Robison  MRN: [id number]   : 1935    Patient not seen this date for Physical Therapy due to:      [x] Cancel by RN or physician due to:  CARLA Fulton requesting therapy hold this date, stating pt has still been combative & agitated this morning.  PT will continue to follow and ck back as able.      [] Hemodialysis    [] Critical Lab Value Level     [] Blood transfusion in progress    [] Acute or unstable cardiovascular status   _MAP < 55 or more than >115  _HR < 40 or > 130    [] Acute or unstable pulmonary status   -FiO2 > 60%   _RR < 5 or >40    _O2 sats < 85%    [] Strict Bedrest    [] Off Unit for surgery or procedure    [] Off Unit for testing       [] Pending imaging to R/O fracture    [] Refusal by Patient      [] Other      [] PT being discontinued at this time. Patient independent. No further needs.     [] PT being discontinued at this time as the patient has been transferred to hospice care. No further needs.      Nimisha Garzon, PT

## 2025-01-12 NOTE — PLAN OF CARE
Problem: Safety - Adult  Goal: Free from fall injury  Outcome: HH/HSPC Not Progressing     Problem: Chronic Conditions and Co-morbidities  Goal: Patient's chronic conditions and co-morbidity symptoms are monitored and maintained or improved  Outcome: HH/HSPC Not Progressing     Problem: Discharge Planning  Goal: Discharge to home or other facility with appropriate resources  Outcome: HH/HSPC Not Progressing     Problem: Pain  Goal: Verbalizes/displays adequate comfort level or baseline comfort level  Outcome: HH/HSPC Not Progressing       Problem: Skin/Tissue Integrity  Goal: Absence of new skin breakdown  Description: 1.  Monitor for areas of redness and/or skin breakdown  2.  Assess vascular access sites hourly  3.  Every 4-6 hours minimum:  Change oxygen saturation probe site  4.  Every 4-6 hours:  If on nasal continuous positive airway pressure, respiratory therapy assess nares and determine need for appliance change or resting period.  Outcome: HH/HSPC Not Progressing     Problem: ABCDS Injury Assessment  Goal: Absence of physical injury  Outcome: HH/HSPC Not Progressing

## 2025-01-12 NOTE — PROGRESS NOTES
Occupational Therapy  [facility]  Occupational Therapy Not Seen Note    Patient not available for Occupational Therapy due to:    [] Testing:    [] Hemodialysis    [x] Cancelled by RN: Hold per RN. Pt is agitated, not appropriate for OT eval at this time. Will continue to follow and check back as appropriate.    [] Refusal by Patient:    [] Surgery:     [] Intubation:     [] Pain Medication:    [] Sedation:     [] Spine Precautions :    [] Medical Instability:    [] Other:      Pastora CABRALES, OT

## 2025-01-12 NOTE — PROGRESS NOTES
[facility] Neurology   IN-PATIENT SERVICE      NEUROLOGY PROGRESS  NOTE                Interval History:     Continues to be either lethargic secondary to sedation, versus restless and agitated when it is paused.  Seroquel has been increased.  Wife at bedside.  Sitter at bedside    History of Present Illness:     The patient is a 89 y.o. male who presents with Wound Check (Left leg, pt's wife and pt very poor historians. Wife states she noticed leg swelling \"a few days ago\" and that pt has dementia. Pt states he is not sure what caused wound on left leg. Pt's wife states that he does wander over night and could of possibly burned leg. )  . The patient was seen and examined and the chart was reviewed.  Patient initially presenting to the hospital due to what was described as a wound on the left lower extremity on 1/6.  Patient resides at home with wife.  Reportedly has history of dementia and is a poor historian.  During his hospitalization he has had significant delirium, sundowning at night requiring antipsychotics and even Precedex infusion.  Currently in the medical ICU being treated with Precedex which actually has been weaned off over the last 24 hours.  During the day today has been fairly calm and somnolent as per nursing report.  Continues to have a one-to-one sitter.  Patient is very hard of hearing as is his wife reportedly.  He has history of a flutter/A-fib not compliant on anticoagulation.  Currently being treated for left lower extremity cellulitis as well as acute delirium.     At this time patient is awake alert to self.  Able to follow commands with multiple prompts.  He is very hard of hearing.  Speech is dysarthric due to dry mouth.  He has not really eaten or drank much today.  Has been off of Precedex sedation today.  He received Seroquel overnight.  Pulmonology has been consulted for critical care management.  No family numbers at bedside    Physical Exam:   BP (!) 139/108   Pulse 87    anticoagulation    Plan:     Seroquel has been increased.  Continue Exelon patch.  Wean Precedex infusion as tolerated.  Will likely need dementia unit upon discharge.  Consider psychiatry input.  No further neurologic workup at this time.  We will sign off, please do not hesitate to contact with any further questions or concerns.      Electronically signed by Bobo Aguilar DO on 1/12/2025 at 3:52 PM      Bobo Aguilar DO  [facility]  Neurology

## 2025-01-12 NOTE — PROGRESS NOTES
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  He seems to be calmer today tolerating tube feeds but still requires  Precedex 0.3.  He has QTc at 4 43.  He had been bedridden.  Daughter-in-law at bedside     Examination:    Vitals: /76   Pulse (!) 104   Temp 98.6 °F (37 °C)   Resp 18   Ht 1.676 m (5' 6\")   Wt 91.6 kg (202 lb)   SpO2 97%   BMI 32.60 kg/m²   SpO2  Av.2 %  Min: 88 %  Max: 100 %  General appearance: Sleepy and cooperative with exam  Neck: No JVD  Lungs: Decreased breath sound no crackles or wheezing  Heart: regular rate and rhythm, S1, S2 normal, no gallop  Abdomen: Soft, non tender, + BS  Extremities: no cyanosis or clubbing. No significant edema left lower extremity wound/burn dressing clean    LABs:    CBC:   Recent Labs     25  0437 25  0413   WBC 6.4 8.3   HGB 11.6* 11.9*   HCT 38.6* 38.9*    188     BMP:   Recent Labs     25  0437 25  0413    139   K 4.2 3.8   CO2 30 34*   BUN 24* 23   CREATININE 1.3* 1.2   LABGLOM 53* 56*   GLUCOSE 118* 128*     PT/INR: No results for input(s): \"PROTIME\", \"INR\" in the last 72 hours.  APTT:No results for input(s): \"APTT\" in the last 72 hours.  LIVER PROFILE:  No results for input(s): \"AST\", \"ALT\", \"LABALBU\" in the last 72 hours.     Latest Reference Range & Units 25 15:10   NT Pro-BNP 0 - 450 pg/mL 4,715 (H)   (H): Data is abnormally high   Latest Reference Range & Units 01/10/25 15:55   NT Pro-BNP 0 - 450 pg/mL 4,320 (H)   (H): Data is abnormally high  Radiology:  Chest x-ray   1. Possible slight improvement in central predominant alveolar and  interstitial opacities potentially due to congestive heart failure given mild  cardiomegaly and at least trace bilateral pleural effusions.  Decreased  central vascular crowding due to improved aeration could contribute to  the  appearance, and pneumonia could appear similar.  2. Slightly decreased bibasilar airspace opacities likely due to improvement  in atelectasis with superimposed pneumonia or aspiration not excluded.       Chest x-ray 1/10  Cardiomegaly with interstitial and alveolar edema, not substantially changed.       Chest x-ray 1/9  1.  Moderate pulmonary vascular congestion with perihilar to infrahilar  opacities suggesting edema.     2.  Retrocardiac regions have reduced sensitivity and summation but likely  additional opacities.  At least small amounts of pleural effusion are present  and could have basilar atelectasis    .  Chest x-ray 1/6/2025    Echocardiogram on 1/7/25   Left Ventricle: Normal left ventricular systolic function. EF by 2D Simpsons Biplane is 57%. Left ventricle size is normal. Mildly increased wall thickness. Normal wall motion. Normal diastolic function.    Right Ventricle: Right ventricle size is normal. Normal systolic function.    Aortic Valve: Moderate regurgitation.    Mitral Valve: Moderate regurgitation with multiple jets.    Tricuspid Valve: Mild to moderate regurgitation. Mildly elevated RVSP, consistent with mild pulmonary hypertension. The estimated RVSP is 43 mmHg.    Left Atrium: Left atrium is mildly dilated. Left atrial volume index is mildly increased (35-41 mL/m2).    Image quality is adequate.  CT brain 1/9  Atrophy without acute intracranial abnormality     Impression/recommendations:    Acute hypoxic respiratory insufficiency/ pulmonary edema   Oxygen by nasal cannula  Incentive spirometry every hour while awake        Metabolic encephalopathy/agitation/possible    Neurology on consult  Precedex for agitation as needed RASS 0  Make seroquel for QAM  and 75 QHS/hold if QTc above 500/give 25 per NG now       Left lower extreme cellulitis possible burn/PVD  Cefazolin IV  Check blood culture  Wound care  Vascular evaluation     A-fib flutter/CAD status post PCI  Cardiology on consult

## 2025-01-12 NOTE — PROGRESS NOTES
Patient precedex gtt titrated off, Patient was on 4L and RN titrated O2 off, writer started tubefeeds and patient tolerated feeds well, D5 was turned off.

## 2025-01-12 NOTE — PROGRESS NOTES
Coverage for Dr. Jenkins.    Subjective:   Still confused.  Getting Precedex as needed.  No reported cardiac issues overnight.    VS: /76   Pulse (!) 104   Temp 98.6 °F (37 °C)   Resp 18   Ht 1.676 m (5' 6\")   Wt 91.6 kg (202 lb)   SpO2 97%   BMI 32.60 kg/m²     Wt:     I/O: In: 1817.4 [I.V.:1461; NG/GT:210]  Out: 7825 [Urine:7825]    Monitor: Atrial flutter with vrate 90's-one teens    Meds: Scheduled Meds:   QUEtiapine  75 mg Oral Nightly    [START ON 1/13/2025] QUEtiapine  50 mg Oral Daily    metoprolol tartrate  25 mg Oral BID    [START ON 1/13/2025] bumetanide  1 mg Oral Once per day on Monday Wednesday Friday    atorvastatin  20 mg Oral Daily    rivastigmine  1 patch TransDERmal Daily    ceFAZolin  1,000 mg IntraVENous Q8H    sodium chloride flush  5-40 mL IntraVENous 2 times per day    aspirin  81 mg Oral Daily    clopidogrel  75 mg Oral Daily    morphine  4 mg IntraVENous Once    miconazole   Topical BID    sodium chloride flush  5-40 mL IntraVENous 2 times per day    enoxaparin  40 mg SubCUTAneous Daily     Continuous Infusions:   sodium chloride      sodium chloride      dexmedeTOMIDine (PRECEDEX) 400 mcg in sodium chloride 0.9 % 100 mL infusion 0.2 mcg/kg/hr (01/12/25 0626)     PRN Meds:.sodium chloride flush, sodium chloride, sodium chloride flush, sodium chloride, acetaminophen, ondansetron **OR** ondansetron, haloperidol lactate     Exam:General Appearance: Confused  Skin: warm, dry  Pulmonary/Chest: Mildly diminished at bases anteriorly  Cardiovascular: irregular rhythm.    Extremities: Mild peripheral edema    Labs:    CBC with Differential:    Lab Results   Component Value Date/Time    WBC 8.3 01/12/2025 04:13 AM    RBC 4.44 01/12/2025 04:13 AM    HGB 11.9 01/12/2025 04:13 AM    HCT 38.9 01/12/2025 04:13 AM     01/12/2025 04:13 AM     09/23/2011 07:51 AM    MCV 87.6 01/12/2025 04:13 AM    MCH 26.8 01/12/2025 04:13 AM    MCHC 30.6 01/12/2025 04:13 AM    RDW 16.1 01/12/2025

## 2025-01-12 NOTE — PROGRESS NOTES
Patient became agitiated, attempting to hit/kick, writer restarted precedex and gave IM injection of haldol

## 2025-01-12 NOTE — PROGRESS NOTES
Progress Note    Subjective:  Hypoxic respiratory failure.  He is on nasal cannula, nebulizer,  Dementia with psychosis, sundowning  Clinical sleep apnea  Acute/CKD  Diastolic congestive heart failure on Bumex  A-fib  The patient is awake and alert.  No problems overnight.  Denies chest pain, angina, and dyspnea.  Denies abdominal pain.  Tolerating diet.  No nausea or vomiting.    Admit Date:  1/6/2025    Patient Seen, Chart, Labs, Radiology studies, and Consults reviewed.    Objective:   BP (!) 139/108   Pulse 87   Temp 98.6 °F (37 °C)   Resp 20   Ht 1.676 m (5' 6\")   Wt 91.6 kg (202 lb)   SpO2 93%   BMI 32.60 kg/m²     Intake/Output Summary (Last 24 hours) at 1/12/2025 1415  Last data filed at 1/12/2025 1410  Gross per 24 hour   Intake 1846.96 ml   Output 7350 ml   Net -5503.04 ml     General appearance - alert, well appearing, and in no distress and oriented to person, place, and time  Heart:  RRR, no murmurs, gallops, or rubs, extrasystoles.  Lungs:  CTA bilaterally, no wheeze, rales or rhonchi, good air entry, good respiratory effort  Abd: bowel sounds present, nontender, nondistended, no masses, no rigidity, no guarding, no peritoneal signs.  Integumentary: Skin good color, good turgor, no rashes, no acute lesions  Upper Extrem:  No clubbing bilateral hands, no cyanosis or edema  Lower Extrem:no cyanosis or edema, no calf tenderness to lower extremities  Neurological - alert, oriented, normal speech, no focal findings or movement disorder noted, neck supple without rigidity, motor and sensory grossly normal bilaterally      Medications:    QUEtiapine  75 mg Oral Nightly    [START ON 1/13/2025] QUEtiapine  50 mg Oral Daily    metoprolol tartrate  25 mg Oral BID    [START ON 1/13/2025] bumetanide  1 mg Oral Once per day on Monday Wednesday Friday    atorvastatin  20 mg Oral Daily    rivastigmine  1 patch TransDERmal Daily    ceFAZolin  1,000 mg IntraVENous Q8H    sodium chloride flush  5-40 mL IntraVENous

## 2025-01-12 NOTE — PROGRESS NOTES
End Of Shift Note  [place] [facility]  Summary of shift: Patient remains on precedex and D5. Urine output was 1325 this shift. Patient became very agitated around 1430. Alternative measures were tried and precedex was increased. Patient has been resting since. NG is in place at 58 in the right nares and bridled.     Vitals:    Vitals:    01/11/25 1800 01/11/25 1815 01/11/25 1830 01/11/25 1845   BP: 109/79  121/71    Pulse: 55 67 66 59   Resp: 12 13 12 12   Temp: 98.1 °F (36.7 °C) 98.1 °F (36.7 °C) 98.1 °F (36.7 °C) 98.1 °F (36.7 °C)   TempSrc:       SpO2: 94% 94% 95% 94%   Weight:       Height:            I&O:   Intake/Output Summary (Last 24 hours) at 1/11/2025 1901  Last data filed at 1/11/2025 1900  Gross per 24 hour   Intake 1914.16 ml   Output 3725 ml   Net -1810.84 ml       Resp Status: 4L NC    Ventilator Settings:     / / /     Critical Care IV infusions:   dextrose 50 mL/hr at 01/11/25 1900    sodium chloride      sodium chloride      dexmedeTOMIDine (PRECEDEX) 400 mcg in sodium chloride 0.9 % 100 mL infusion 0.8 mcg/kg/hr (01/11/25 1900)        LDA:   NG/OG/NJ/NE Tube Nasogastric 16 fr Right nostril (Active)   Number of days: 1       Urinary Catheter 01/07/25 Levine;Coude (Active)   Number of days: 4       Midline 01/08/25 Right Basilic (Active)   Number of days: 3       Wound 01/06/25 Pretibial Left;Anterior (Active)   Number of days: 4

## 2025-01-12 NOTE — PLAN OF CARE
Problem: Safety - Adult  Goal: Free from fall injury  1/11/2025 2105 by Emilia Lobo RN  Outcome: Progressing     Problem: Chronic Conditions and Co-morbidities  Goal: Patient's chronic conditions and co-morbidity symptoms are monitored and maintained or improved  1/11/2025 2105 by Emilia Lobo RN  Outcome: Progressing     Problem: Pain  Goal: Verbalizes/displays adequate comfort level or baseline comfort level  1/11/2025 2105 by Emilia Lobo RN  Outcome: Progressing     Problem: Skin/Tissue Integrity  Goal: Absence of new skin breakdown  Description: 1.  Monitor for areas of redness and/or skin breakdown  2.  Assess vascular access sites hourly  3.  Every 4-6 hours minimum:  Change oxygen saturation probe site  4.  Every 4-6 hours:  If on nasal continuous positive airway pressure, respiratory therapy assess nares and determine need for appliance change or resting period.  1/11/2025 2105 by Emilia Lobo RN  Outcome: Progressing     Problem: ABCDS Injury Assessment  Goal: Absence of physical injury  1/11/2025 2105 by Emilia Lobo RN  Outcome: Progressing     Problem: Chronic Conditions and Co-morbidities  Goal: Patient's chronic conditions and co-morbidity symptoms are monitored and maintained or improved  1/11/2025 2105 by Emilia Lobo RN  Outcome: Progressing  1/11/2025 1137 by Jessica Delarosa RN  Outcome: Not Progressing  Flowsheets (Taken 1/11/2025 0800)  Care Plan - Patient's Chronic Conditions and Co-Morbidity Symptoms are Monitored and Maintained or Improved:   Monitor and assess patient's chronic conditions and comorbid symptoms for stability, deterioration, or improvement   Collaborate with multidisciplinary team to address chronic and comorbid conditions and prevent exacerbation or deterioration   Update acute care plan with appropriate goals if chronic or comorbid symptoms are exacerbated and prevent overall improvement and discharge     Problem: Discharge Planning  Goal: Discharge to home or

## 2025-01-13 ENCOUNTER — APPOINTMENT (OUTPATIENT)
Dept: GENERAL RADIOLOGY | Age: 89
DRG: 291 | End: 2025-01-13
Payer: MEDICARE

## 2025-01-13 VITALS
RESPIRATION RATE: 16 BRPM | HEART RATE: 100 BPM | OXYGEN SATURATION: 98 % | WEIGHT: 202 LBS | DIASTOLIC BLOOD PRESSURE: 67 MMHG | TEMPERATURE: 99.5 F | HEIGHT: 66 IN | SYSTOLIC BLOOD PRESSURE: 146 MMHG | BODY MASS INDEX: 32.47 KG/M2

## 2025-01-13 LAB
ALBUMIN SERPL-MCNC: 3.1 G/DL (ref 3.5–5.2)
ALBUMIN/GLOB SERPL: 1.2 {RATIO} (ref 1–2.5)
ALP SERPL-CCNC: 108 U/L (ref 40–129)
ALT SERPL-CCNC: 7 U/L (ref 10–50)
ANION GAP SERPL CALCULATED.3IONS-SCNC: 8 MMOL/L (ref 9–16)
AST SERPL-CCNC: 45 U/L (ref 10–50)
BASOPHILS # BLD: 0.03 K/UL (ref 0–0.2)
BASOPHILS NFR BLD: 0 % (ref 0–2)
BILIRUB SERPL-MCNC: 0.4 MG/DL (ref 0–1.2)
BUN SERPL-MCNC: 28 MG/DL (ref 8–23)
CALCIUM SERPL-MCNC: 9.4 MG/DL (ref 8.8–10.2)
CHLORIDE SERPL-SCNC: 97 MMOL/L (ref 98–107)
CO2 SERPL-SCNC: 32 MMOL/L (ref 20–31)
CREAT SERPL-MCNC: 1.1 MG/DL (ref 0.7–1.2)
EOSINOPHIL # BLD: 0.12 K/UL (ref 0–0.44)
EOSINOPHILS RELATIVE PERCENT: 1 % (ref 1–4)
ERYTHROCYTE [DISTWIDTH] IN BLOOD BY AUTOMATED COUNT: 16.5 % (ref 11.8–14.4)
GFR, ESTIMATED: 66 ML/MIN/1.73M2
GLUCOSE SERPL-MCNC: 128 MG/DL (ref 82–115)
HCT VFR BLD AUTO: 41.6 % (ref 40.7–50.3)
HGB BLD-MCNC: 12.9 G/DL (ref 13–17)
IMM GRANULOCYTES # BLD AUTO: 0.05 K/UL (ref 0–0.3)
IMM GRANULOCYTES NFR BLD: 0 %
LYMPHOCYTES NFR BLD: 1.08 K/UL (ref 1.1–3.7)
LYMPHOCYTES RELATIVE PERCENT: 9 % (ref 24–43)
MCH RBC QN AUTO: 27.2 PG (ref 25.2–33.5)
MCHC RBC AUTO-ENTMCNC: 31 G/DL (ref 28.4–34.8)
MCV RBC AUTO: 87.6 FL (ref 82.6–102.9)
MONOCYTES NFR BLD: 1.37 K/UL (ref 0.1–1.2)
MONOCYTES NFR BLD: 12 % (ref 3–12)
NEUTROPHILS NFR BLD: 78 % (ref 36–65)
NEUTS SEG NFR BLD: 9.16 K/UL (ref 1.5–8.1)
NRBC BLD-RTO: 0 PER 100 WBC
PLATELET # BLD AUTO: 216 K/UL (ref 138–453)
PMV BLD AUTO: 9.5 FL (ref 8.1–13.5)
POTASSIUM SERPL-SCNC: 4.2 MMOL/L (ref 3.7–5.3)
PROT SERPL-MCNC: 5.8 G/DL (ref 6.6–8.7)
RBC # BLD AUTO: 4.75 M/UL (ref 4.21–5.77)
RBC # BLD: ABNORMAL 10*6/UL
SODIUM SERPL-SCNC: 138 MMOL/L (ref 136–145)
WBC OTHER # BLD: 11.8 K/UL (ref 3.5–11.3)

## 2025-01-13 PROCEDURE — 94761 N-INVAS EAR/PLS OXIMETRY MLT: CPT

## 2025-01-13 PROCEDURE — 6370000000 HC RX 637 (ALT 250 FOR IP): Performed by: INTERNAL MEDICINE

## 2025-01-13 PROCEDURE — 85025 COMPLETE CBC W/AUTO DIFF WBC: CPT

## 2025-01-13 PROCEDURE — 2500000003 HC RX 250 WO HCPCS: Performed by: FAMILY MEDICINE

## 2025-01-13 PROCEDURE — 36415 COLL VENOUS BLD VENIPUNCTURE: CPT

## 2025-01-13 PROCEDURE — 6370000000 HC RX 637 (ALT 250 FOR IP): Performed by: FAMILY MEDICINE

## 2025-01-13 PROCEDURE — 71045 X-RAY EXAM CHEST 1 VIEW: CPT

## 2025-01-13 PROCEDURE — 6360000002 HC RX W HCPCS: Performed by: INTERNAL MEDICINE

## 2025-01-13 PROCEDURE — 2000000000 HC ICU R&B

## 2025-01-13 PROCEDURE — 6370000000 HC RX 637 (ALT 250 FOR IP): Performed by: NURSE PRACTITIONER

## 2025-01-13 PROCEDURE — 2580000003 HC RX 258: Performed by: FAMILY MEDICINE

## 2025-01-13 PROCEDURE — 99233 SBSQ HOSP IP/OBS HIGH 50: CPT | Performed by: FAMILY MEDICINE

## 2025-01-13 PROCEDURE — 80053 COMPREHEN METABOLIC PANEL: CPT

## 2025-01-13 PROCEDURE — 6360000002 HC RX W HCPCS: Performed by: FAMILY MEDICINE

## 2025-01-13 PROCEDURE — 2580000003 HC RX 258: Performed by: INTERNAL MEDICINE

## 2025-01-13 PROCEDURE — 6370000000 HC RX 637 (ALT 250 FOR IP): Performed by: PSYCHIATRY & NEUROLOGY

## 2025-01-13 RX ORDER — METOPROLOL TARTRATE 50 MG
50 TABLET ORAL 2 TIMES DAILY
Status: DISCONTINUED | OUTPATIENT
Start: 2025-01-13 | End: 2025-01-15 | Stop reason: HOSPADM

## 2025-01-13 RX ORDER — HYDRALAZINE HYDROCHLORIDE 25 MG/1
25 TABLET, FILM COATED ORAL EVERY 8 HOURS SCHEDULED
Status: DISCONTINUED | OUTPATIENT
Start: 2025-01-13 | End: 2025-01-15 | Stop reason: HOSPADM

## 2025-01-13 RX ORDER — ASPIRIN 81 MG/1
81 TABLET, CHEWABLE ORAL DAILY
Status: DISCONTINUED | OUTPATIENT
Start: 2025-01-14 | End: 2025-01-15 | Stop reason: HOSPADM

## 2025-01-13 RX ORDER — VALPROIC ACID 250 MG/5ML
500 SOLUTION ORAL 2 TIMES DAILY
Status: DISCONTINUED | OUTPATIENT
Start: 2025-01-13 | End: 2025-01-15 | Stop reason: HOSPADM

## 2025-01-13 RX ORDER — CEPHALEXIN 500 MG/1
500 CAPSULE ORAL EVERY 8 HOURS SCHEDULED
Status: DISPENSED | OUTPATIENT
Start: 2025-01-13 | End: 2025-01-15

## 2025-01-13 RX ADMIN — ASPIRIN 81 MG: 81 TABLET, COATED ORAL at 09:15

## 2025-01-13 RX ADMIN — METOPROLOL TARTRATE 50 MG: 50 TABLET, FILM COATED ORAL at 19:44

## 2025-01-13 RX ADMIN — HALOPERIDOL LACTATE 6 MG: 5 INJECTION, SOLUTION INTRAMUSCULAR at 04:53

## 2025-01-13 RX ADMIN — SODIUM CHLORIDE, PRESERVATIVE FREE 10 ML: 5 INJECTION INTRAVENOUS at 09:23

## 2025-01-13 RX ADMIN — QUETIAPINE FUMARATE 50 MG: 25 TABLET ORAL at 09:15

## 2025-01-13 RX ADMIN — QUETIAPINE FUMARATE 75 MG: 25 TABLET ORAL at 18:39

## 2025-01-13 RX ADMIN — MICONAZOLE NITRATE: 20 CREAM TOPICAL at 09:24

## 2025-01-13 RX ADMIN — VALPROIC ACID 500 MG: 250 SOLUTION ORAL at 13:49

## 2025-01-13 RX ADMIN — HYDRALAZINE HYDROCHLORIDE 25 MG: 25 TABLET ORAL at 04:42

## 2025-01-13 RX ADMIN — CEPHALEXIN 500 MG: 500 CAPSULE ORAL at 13:49

## 2025-01-13 RX ADMIN — CEPHALEXIN 500 MG: 500 CAPSULE ORAL at 21:23

## 2025-01-13 RX ADMIN — VALPROIC ACID 500 MG: 250 SOLUTION ORAL at 19:46

## 2025-01-13 RX ADMIN — METOPROLOL TARTRATE 50 MG: 50 TABLET, FILM COATED ORAL at 09:15

## 2025-01-13 RX ADMIN — MICONAZOLE NITRATE: 20 CREAM TOPICAL at 19:45

## 2025-01-13 RX ADMIN — ENOXAPARIN SODIUM 40 MG: 100 INJECTION SUBCUTANEOUS at 09:14

## 2025-01-13 RX ADMIN — CEFAZOLIN 1000 MG: 1 INJECTION, POWDER, FOR SOLUTION INTRAMUSCULAR; INTRAVENOUS at 05:11

## 2025-01-13 RX ADMIN — SODIUM CHLORIDE 220 ML: 9 INJECTION, SOLUTION INTRAVENOUS at 05:07

## 2025-01-13 RX ADMIN — HYDRALAZINE HYDROCHLORIDE 25 MG: 25 TABLET ORAL at 21:24

## 2025-01-13 RX ADMIN — DEXMEDETOMIDINE 0.2 MCG/KG/HR: 100 INJECTION, SOLUTION INTRAVENOUS at 05:26

## 2025-01-13 RX ADMIN — CLOPIDOGREL BISULFATE 75 MG: 75 TABLET ORAL at 09:15

## 2025-01-13 RX ADMIN — ATORVASTATIN CALCIUM 20 MG: 20 TABLET, FILM COATED ORAL at 09:15

## 2025-01-13 RX ADMIN — BUMETANIDE 1 MG: 1 TABLET ORAL at 09:15

## 2025-01-13 RX ADMIN — SODIUM CHLORIDE, PRESERVATIVE FREE 10 ML: 5 INJECTION INTRAVENOUS at 19:45

## 2025-01-13 RX ADMIN — DEXMEDETOMIDINE 0.2 MCG/KG/HR: 100 INJECTION, SOLUTION INTRAVENOUS at 15:32

## 2025-01-13 ASSESSMENT — PAIN SCALES - PAIN ASSESSMENT IN ADVANCED DEMENTIA (PAINAD)
BODYLANGUAGE: TENSE, DISTRESSED PACING, FIDGETING
FACIALEXPRESSION: SMILING OR INEXPRESSIVE
TOTALSCORE: 1
CONSOLABILITY: NO NEED TO CONSOLE
BODYLANGUAGE: RELAXED
BREATHING: NORMAL
CONSOLABILITY: NO NEED TO CONSOLE
CONSOLABILITY: NO NEED TO CONSOLE
TOTALSCORE: 2
BREATHING: NORMAL
BODYLANGUAGE: TENSE, DISTRESSED PACING, FIDGETING
BREATHING: NORMAL
NEGVOCALIZATION: OCCASIONAL MOAN/GROAN, LOW SPEECH, NEGATIVE/DISAPPROVING QUALITY
TOTALSCORE: 0
FACIALEXPRESSION: SMILING OR INEXPRESSIVE
FACIALEXPRESSION: SMILING OR INEXPRESSIVE

## 2025-01-13 ASSESSMENT — PAIN SCALES - GENERAL
PAINLEVEL_OUTOF10: 0
PAINLEVEL_OUTOF10: 2
PAINLEVEL_OUTOF10: 0
PAINLEVEL_OUTOF10: 1
PAINLEVEL_OUTOF10: 0

## 2025-01-13 NOTE — PROGRESS NOTES
End Of Shift Note  [facility]  Summary of shift: Pt off precedex for most of shift. Sitter remains at beside due to impulsiveness including attempts to pull out NG tube and hallucinations. Remained resistant to staff, but able to redirect. Halidol given once when pt became resistant to staff. Restarted precedex at 0526 after pt became more agitated with needed dressing changes and repositioning with changing linens. NG remains in place. Tube feed at goal rate of 70 mL/hr. Levine remains in place with 300 mL output overnight. SBP up to 170's overnight. New orders received to increase lopressor to 50 mg BID and added hydralazine 25 mg TID.     Vitals:    Vitals:    01/13/25 0400 01/13/25 0500 01/13/25 0521 01/13/25 0600   BP: (!) 167/80   (!) 153/65   Pulse: (!) 105 (!) 116 (!) 101 97   Resp: 20 23 17 18   Temp: 99.1 °F (37.3 °C) 99 °F (37.2 °C) 98.8 °F (37.1 °C) 98.4 °F (36.9 °C)   TempSrc:       SpO2: 91% 96% 93% 97%   Weight:       Height:            I&O:   Intake/Output Summary (Last 24 hours) at 1/13/2025 0658  Last data filed at 1/13/2025 0651  Gross per 24 hour   Intake 1620.95 ml   Output 1075 ml   Net 545.95 ml       Resp Status: RA    Ventilator Settings:     / / /     Critical Care IV infusions:   sodium chloride      sodium chloride 5 mL/hr at 01/13/25 0651    dexmedeTOMIDine (PRECEDEX) 400 mcg in sodium chloride 0.9 % 100 mL infusion 0.4 mcg/kg/hr (01/13/25 0651)        LDA:   NG/OG/NJ/NE Tube Nasogastric 16 fr Right nostril (Active)   Number of days: 2       Urinary Catheter 01/07/25 Levine;Coude (Active)   Number of days: 6       Midline 01/08/25 Right Basilic (Active)   Number of days: 4       Wound 01/06/25 Pretibial Left;Anterior (Active)   Number of days: 6

## 2025-01-13 NOTE — PLAN OF CARE
Problem: Safety - Adult  Goal: Free from fall injury  1/12/2025 2306 by Pastora Hilario RN  Outcome: Progressing  1/12/2025 1753 by Kirstin Flanagan  Outcome: HH/HSPC Not Progressing     Problem: Chronic Conditions and Co-morbidities  Goal: Patient's chronic conditions and co-morbidity symptoms are monitored and maintained or improved  1/12/2025 2306 by Pastora Hilario RN  Outcome: Progressing  Flowsheets (Taken 1/12/2025 2000)  Care Plan - Patient's Chronic Conditions and Co-Morbidity Symptoms are Monitored and Maintained or Improved:   Monitor and assess patient's chronic conditions and comorbid symptoms for stability, deterioration, or improvement   Collaborate with multidisciplinary team to address chronic and comorbid conditions and prevent exacerbation or deterioration   Update acute care plan with appropriate goals if chronic or comorbid symptoms are exacerbated and prevent overall improvement and discharge  1/12/2025 1753 by Kirstin Flanagan  Outcome: HH/HSPC Not Progressing     Problem: Discharge Planning  Goal: Discharge to home or other facility with appropriate resources  1/12/2025 2306 by Pastora Hilario RN  Outcome: Progressing  Flowsheets (Taken 1/12/2025 2000)  Discharge to home or other facility with appropriate resources:   Identify barriers to discharge with patient and caregiver   Arrange for needed discharge resources and transportation as appropriate   Identify discharge learning needs (meds, wound care, etc)   Refer to discharge planning if patient needs post-hospital services based on physician order or complex needs related to functional status, cognitive ability or social support system  1/12/2025 1753 by Kirstin Flanagan  Outcome: HH/HSPC Not Progressing     Problem: Pain  Goal: Verbalizes/displays adequate comfort level or baseline comfort level  1/12/2025 2306 by Pastora Hilario RN  Outcome: Progressing  1/12/2025 1753 by Kirstin Flanagan  Outcome: HH/HSPC Not Progressing     Problem: Safety -

## 2025-01-13 NOTE — PROGRESS NOTES
Progress Note    Subjective:  Follow-up on acute hypoxic respiratory failure  Dementia with psychosis  Sundowning  Clinical sleep apnea  Morbid obesity  Heart failure with preserved ejection fraction  The patient is awake and alert.  No problems overnight.  Denies chest pain, angina, and dyspnea.  Denies abdominal pain.  Tolerating diet.  No nausea or vomiting.    Admit Date:  1/6/2025    Patient Seen, Chart, Labs, Radiology studies, and Consults reviewed.    Objective:   BP (!) 88/54   Pulse 77   Temp (!) 96.3 °F (35.7 °C)   Resp 16   Ht 1.676 m (5' 6\")   Wt 91.6 kg (202 lb)   SpO2 100%   BMI 32.60 kg/m²     Intake/Output Summary (Last 24 hours) at 1/13/2025 1633  Last data filed at 1/13/2025 1628  Gross per 24 hour   Intake 1521.41 ml   Output 2225 ml   Net -703.59 ml     General appearance - alert, well appearing, and in no distress and oriented to person, place, and time  Heart:  RRR, no murmurs, gallops, or rubs, extrasystoles.  Lungs:  CTA bilaterally, no wheeze, rales or rhonchi, good air entry, good respiratory effort  Abd: bowel sounds present, nontender, nondistended, no masses, no rigidity, no guarding, no peritoneal signs.  Integumentary: Skin good color, good turgor, no rashes, no acute lesions  Upper Extrem:  No clubbing bilateral hands, no cyanosis or edema  Lower Extrem:no cyanosis or edema, no calf tenderness to lower extremities  Neurological - alert, oriented, normal speech, no focal findings or movement disorder noted, neck supple without rigidity, motor and sensory grossly normal bilaterally      Medications:    metoprolol tartrate  50 mg Oral BID    hydrALAZINE  25 mg Oral 3 times per day    valproic acid  500 mg Per NG tube BID    cephALEXin  500 mg Oral 3 times per day    [START ON 1/14/2025] aspirin  81 mg Oral Daily    QUEtiapine  75 mg Oral Nightly    QUEtiapine  50 mg Oral Daily    bumetanide  1 mg Oral Once per day on Monday Wednesday Friday    atorvastatin  20 mg Oral Daily     on Bumex 1 mg 3 times daily per cardiology  Cellulitis left leg.  He is on Ancef, wound care on board  CKD.  Will observe  Patient needs to be off the Precedex for 24 hours before he could be accepted at dementia unit  Plan of care discussed with nursing staff  Will follow along  This note is created with a voice recognition program and while intend to generate a document that accurately reflects the content of the visit, no guarantee can be provided that every mistake has been identified and corrected by editing.      Sepideh Gresham MD, MD 1/13/2025 4:33 PM

## 2025-01-13 NOTE — PROGRESS NOTES
Spiritual Health History and Assessment/Progress Note  [facility]    Spiritual/Emotional Needs,  ,  ,      Name: Jose Robison MRN: [id number]    Age: 89 y.o.     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  provided prayer and presence.    Angelina, Belief, Meaning:   Patient unable to assess at this time  Family/Friends No family/friends present      Importance and Influence:  Patient unable to assess at this time  Family/Friends No family/friends present    Community:  Patient feels well-supported. Support system includes: Other: patient's chart indicates spouse is contact  Family/Friends No family/friends present    Assessment and Plan of Care:     Patient Interventions include: Other: supportive presence  and prayer  Family/Friends Interventions include: No family/friends present    Patient Plan of Care: Spiritual Care available upon further referral  Family/Friends Plan of Care: Spiritual Care available upon further referral    Electronically signed by Chaplain Juan on 1/13/2025 at 12:58 PM

## 2025-01-13 NOTE — PROGRESS NOTES
Subjective: On Precedex. No acute events.     VS: /79   Pulse 82   Temp 98.4 °F (36.9 °C)   Resp 18   Ht 1.676 m (5' 6\")   Wt 91.6 kg (202 lb)   SpO2 97%   BMI 32.60 kg/m²     Wt:     I/O: In: 1641 [I.V.:91.1; NG/GT:1401]  Out: 1075 [Urine:1075]    Monitor: Atrial flutter with controlled vrate     Meds: Scheduled Meds:   metoprolol tartrate  50 mg Oral BID    hydrALAZINE  25 mg Oral 3 times per day    QUEtiapine  75 mg Oral Nightly    QUEtiapine  50 mg Oral Daily    bumetanide  1 mg Oral Once per day on Monday Wednesday Friday    atorvastatin  20 mg Oral Daily    rivastigmine  1 patch TransDERmal Daily    ceFAZolin  1,000 mg IntraVENous Q8H    sodium chloride flush  5-40 mL IntraVENous 2 times per day    aspirin  81 mg Oral Daily    clopidogrel  75 mg Oral Daily    morphine  4 mg IntraVENous Once    miconazole   Topical BID    sodium chloride flush  5-40 mL IntraVENous 2 times per day    enoxaparin  40 mg SubCUTAneous Daily     Continuous Infusions:   sodium chloride      sodium chloride 5 mL/hr at 01/13/25 0651    dexmedeTOMIDine (PRECEDEX) 400 mcg in sodium chloride 0.9 % 100 mL infusion 0.4 mcg/kg/hr (01/13/25 0651)     PRN Meds:.sodium chloride flush, sodium chloride, sodium chloride flush, sodium chloride, acetaminophen, ondansetron **OR** ondansetron, haloperidol lactate     Exam:General Appearance: Sedated, NAD  Skin: warm, dry  Pulmonary/Chest: diminished at bases anteriorly  Cardiovascular: irregular, negative JVD  Extremities:  No peripheral edema    Labs:     CBC with Differential:    Lab Results   Component Value Date/Time    WBC 11.8 01/13/2025 04:28 AM    RBC 4.75 01/13/2025 04:28 AM    HGB 12.9 01/13/2025 04:28 AM    HCT 41.6 01/13/2025 04:28 AM     01/13/2025 04:28 AM     09/23/2011 07:51 AM    MCV 87.6 01/13/2025 04:28 AM    MCH 27.2 01/13/2025 04:28 AM    MCHC 31.0 01/13/2025 04:28 AM    RDW 16.5 01/13/2025 04:28 AM    LYMPHOPCT 9 01/13/2025 04:28 AM    MONOPCT 12  01/13/2025 04:28 AM    EOSPCT 1 01/13/2025 04:28 AM    BASOPCT 0 01/13/2025 04:28 AM    MONOSABS 1.37 01/13/2025 04:28 AM    LYMPHSABS 1.08 01/13/2025 04:28 AM    EOSABS 0.12 01/13/2025 04:28 AM    BASOSABS 0.03 01/13/2025 04:28 AM    DIFFTYPE NOT REPORTED 12/14/2021 07:52 AM       BMP:    Lab Results   Component Value Date/Time     01/12/2025 04:13 AM    K 3.8 01/12/2025 04:13 AM    CL 97 01/12/2025 04:13 AM    CO2 34 01/12/2025 04:13 AM    BUN 23 01/12/2025 04:13 AM    CREATININE 1.2 01/12/2025 04:13 AM    CALCIUM 9.2 01/12/2025 04:13 AM    GFRAA 59 12/14/2021 07:52 AM    LABGLOM 56 01/12/2025 04:13 AM    LABGLOM 49 10/18/2023 09:12 AM    GLUCOSE 128 01/12/2025 04:13 AM       A/P:1. CAD  -No angina. On B. Blocker. On ASA, Plavix, and Statin.       2. HTN  -Hypertensive overnight. Note Hydralazine added.      3. Hypercholesterolemia  -On Statin.      4. Chronic atrial flutter  -Vrate controlled. Patient has declined anticoagulation.      5. CHF  -On Bumex 1mg po three times weekly. In negative balance.      6. Moderate AI, moderate MR, mild to moderate TR  -Medical therapy.      7. NSVT  -No reoccurrence.       Will discuss with Dr. Jenkins    Electronically signed by PAMELA Hannah - CNP on 1/13/2025 at 10:56 AM

## 2025-01-13 NOTE — PROGRESS NOTES
Physician Progress Note      PATIENT:               ASA FELTON  CSN #:                  [id number]  :                       1935  ADMIT DATE:       2025 2:34 PM  DISCH DATE:  RESPONDING  PROVIDER #:        Sepideh Gresham MD          QUERY TEXT:    Pt admitted with new onset congestive heart failure. Noted documentation of   metabolic encephalopathy by ordered Pulmonology consultant.  If possible,   please document in progress notes and discharge summary:    The medical record reflects the following:  Risk Factors: new onset congestive heart failure, dementia with psychosis  Clinical Indicators: Consults by Jean Carlos Garcia MD at 2025, \"Metabolic   encephalopathy/agitation/possible\"  Treatment: Consults by Jean Carlos Garcia MD at 2025:  Hold off sedatives for now monitor mental status  N.p.o. till more awake  Neurology consulted  Precedex for agitation as needed  Keep seroquel 25 QAM ; make 50 QHS  Options provided:  -- Metabolic encephalopathy confirmed present on admission  -- Other - I will add my own diagnosis  -- Disagree - Not applicable / Not valid  -- Disagree - Clinically unable to determine / Unknown  -- Refer to Clinical Documentation Reviewer    PROVIDER RESPONSE TEXT:    [facility]    Query created by: Era Guzman on 2025 12:54 PM      Electronically signed by:  Sepideh Gresham MD 2025 4:40 PM

## 2025-01-13 NOTE — CARE COORDINATION
Social Work--Met with patient' son regarding SNF. They would like [facility]

## 2025-01-13 NOTE — PROGRESS NOTES
Physical Therapy  DATE: 2025    NAME: Jose Robison  MRN: [id number]   : 1935    Patient not seen this date for Physical Therapy due to:      [x] Cancel by RN or physician due to: Not appropriate, on precedex    [] Hemodialysis    [] Critical Lab Value Level     [] Blood transfusion in progress    [] Acute or unstable cardiovascular status   _MAP < 55 or more than >115  _HR < 40 or > 130    [] Acute or unstable pulmonary status   -FiO2 > 60%   _RR < 5 or >40    _O2 sats < 85%    [] Strict Bedrest    [] Off Unit for surgery or procedure    [] Off Unit for testing       [] Pending imaging to R/O fracture    [] Refusal by Patient      [] Other      [] PT being discontinued at this time. Patient independent. No further needs.     [] PT being discontinued at this time as the patient has been transferred to hospice care. No further needs.      Pastora Dunn, PT

## 2025-01-13 NOTE — CONSULTS
..  Palliative Care Inpatient Consult    NAME:  Jose Robison  MEDICAL RECORD NUMBER:  [id number]  AGE: 89 y.o.   GENDER: male  : 1935  TODAY'S DATE:  2025    Reasons for Consultation:    Provision of information regarding PC and/or hospice philosophies  Complex, time-intensive communication and interdisciplinary psychosocial support  Clarification of goals of care and/or assistance with difficult decision-making  Guidance in regards to resources and transition(s)    Code Status: DNRCC-A    Members of PC team contributing to this consultation are :  Vicenta Armas R.N     History of Present Illness     The patient is a 89 y.o.  Non- / non  male who presents with Wound Check (Left leg, pt's wife and pt very poor historians. Wife states she noticed leg swelling \"a few days ago\" and that pt has dementia. Pt states he is not sure what caused wound on left leg. Pt's wife states that he does wander over night and could of possibly burned leg. 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 The patient has a complicated medical history and has been hospitalized since 2025  2:34 PM.    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

## 2025-01-13 NOTE — ACP (ADVANCE CARE PLANNING)
..Advance Care Planning     Palliative Team Advance Care Planning (ACP) Conversation    Date of Conversation: 01/13/25    Individuals present for the conversation: I talk with his wife Lisa. He lives with her and she states that he has had much decline lately.      ACP documents on file prior to discussion:  -None    Previously completed document/s not on file: Unknown, or patient / participant is uncertain.    Healthcare Decision Maker:    Primary Decision Maker: Lisa Robison - Spouse - [phone]     Conversation Summary:  The patient will be placed and possibly in a dementia unit. He was getting hard to care for at home, and he was wandering at night and had some odd behavior. Lisa states that he was doing ok, and then he had a big decline.     Resuscitation Status:   Code Status: DNR-CCA     Documentation Completed:  -No new documents completed.        Vicenta Armas RN

## 2025-01-14 ENCOUNTER — APPOINTMENT (OUTPATIENT)
Dept: GENERAL RADIOLOGY | Age: 89
DRG: 291 | End: 2025-01-14
Payer: MEDICARE

## 2025-01-14 LAB
ABO + RH BLD: NORMAL
ALBUMIN SERPL-MCNC: 3.3 G/DL (ref 3.5–5.2)
ALBUMIN/GLOB SERPL: 1.1 {RATIO} (ref 1–2.5)
ALP SERPL-CCNC: 113 U/L (ref 40–129)
ALT SERPL-CCNC: 25 U/L (ref 10–50)
AMMONIA PLAS-SCNC: 81 UMOL/L (ref 16–60)
ANION GAP SERPL CALCULATED.3IONS-SCNC: 10 MMOL/L (ref 9–16)
ARM BAND NUMBER: NORMAL
AST SERPL-CCNC: 48 U/L (ref 10–50)
BASOPHILS # BLD: 0.03 K/UL (ref 0–0.2)
BASOPHILS NFR BLD: 0 % (ref 0–2)
BILIRUB DIRECT SERPL-MCNC: 0.2 MG/DL (ref 0–0.2)
BILIRUB INDIRECT SERPL-MCNC: 0.2 MG/DL
BILIRUB SERPL-MCNC: 0.4 MG/DL (ref 0–1.2)
BLOOD BANK SAMPLE EXPIRATION: NORMAL
BLOOD GROUP ANTIBODIES SERPL: NEGATIVE
BUN SERPL-MCNC: 30 MG/DL (ref 8–23)
CALCIUM SERPL-MCNC: 9.4 MG/DL (ref 8.8–10.2)
CHLORIDE SERPL-SCNC: 96 MMOL/L (ref 98–107)
CO2 SERPL-SCNC: 30 MMOL/L (ref 20–31)
CREAT SERPL-MCNC: 1 MG/DL (ref 0.7–1.2)
EOSINOPHIL # BLD: 0.09 K/UL (ref 0–0.44)
EOSINOPHILS RELATIVE PERCENT: 1 % (ref 1–4)
ERYTHROCYTE [DISTWIDTH] IN BLOOD BY AUTOMATED COUNT: 16.5 % (ref 11.8–14.4)
GFR, ESTIMATED: 74 ML/MIN/1.73M2
GLUCOSE BLD-MCNC: 119 MG/DL (ref 75–110)
GLUCOSE BLD-MCNC: 122 MG/DL (ref 75–110)
GLUCOSE BLD-MCNC: 126 MG/DL (ref 75–110)
GLUCOSE BLD-MCNC: 156 MG/DL (ref 75–110)
GLUCOSE BLD-MCNC: 161 MG/DL (ref 75–110)
GLUCOSE SERPL-MCNC: 144 MG/DL (ref 82–115)
HCT VFR BLD AUTO: 40.8 % (ref 40.7–50.3)
HCT VFR BLD AUTO: 43 % (ref 40.7–50.3)
HGB BLD-MCNC: 12.6 G/DL (ref 13–17)
HGB BLD-MCNC: 13.3 G/DL (ref 13–17)
IMM GRANULOCYTES # BLD AUTO: 0.06 K/UL (ref 0–0.3)
IMM GRANULOCYTES NFR BLD: 0 %
INR PPP: 1.2
LYMPHOCYTES NFR BLD: 0.97 K/UL (ref 1.1–3.7)
LYMPHOCYTES RELATIVE PERCENT: 7 % (ref 24–43)
MAGNESIUM SERPL-MCNC: 2 MG/DL (ref 1.6–2.4)
MCH RBC QN AUTO: 26.8 PG (ref 25.2–33.5)
MCHC RBC AUTO-ENTMCNC: 30.9 G/DL (ref 28.4–34.8)
MCV RBC AUTO: 86.6 FL (ref 82.6–102.9)
MONOCYTES NFR BLD: 1.41 K/UL (ref 0.1–1.2)
MONOCYTES NFR BLD: 10 % (ref 3–12)
NEUTROPHILS NFR BLD: 82 % (ref 36–65)
NEUTS SEG NFR BLD: 11.21 K/UL (ref 1.5–8.1)
NRBC BLD-RTO: 0 PER 100 WBC
PARTIAL THROMBOPLASTIN TIME: 30.6 SEC (ref 23.9–33.8)
PLATELET # BLD AUTO: 241 K/UL (ref 138–453)
PMV BLD AUTO: 10.1 FL (ref 8.1–13.5)
POTASSIUM SERPL-SCNC: 4.7 MMOL/L (ref 3.7–5.3)
PROT SERPL-MCNC: 6.2 G/DL (ref 6.6–8.7)
PROTHROMBIN TIME: 15.6 SEC (ref 11.5–14.2)
RBC # BLD AUTO: 4.71 M/UL (ref 4.21–5.77)
RBC # BLD: ABNORMAL 10*6/UL
SODIUM SERPL-SCNC: 136 MMOL/L (ref 136–145)
WBC OTHER # BLD: 13.8 K/UL (ref 3.5–11.3)

## 2025-01-14 PROCEDURE — 6370000000 HC RX 637 (ALT 250 FOR IP): Performed by: NURSE PRACTITIONER

## 2025-01-14 PROCEDURE — 85025 COMPLETE CBC W/AUTO DIFF WBC: CPT

## 2025-01-14 PROCEDURE — 80076 HEPATIC FUNCTION PANEL: CPT

## 2025-01-14 PROCEDURE — 94761 N-INVAS EAR/PLS OXIMETRY MLT: CPT

## 2025-01-14 PROCEDURE — 6360000002 HC RX W HCPCS: Performed by: FAMILY MEDICINE

## 2025-01-14 PROCEDURE — 2000000000 HC ICU R&B

## 2025-01-14 PROCEDURE — 2580000003 HC RX 258: Performed by: FAMILY MEDICINE

## 2025-01-14 PROCEDURE — 86901 BLOOD TYPING SEROLOGIC RH(D): CPT

## 2025-01-14 PROCEDURE — 83735 ASSAY OF MAGNESIUM: CPT

## 2025-01-14 PROCEDURE — 86850 RBC ANTIBODY SCREEN: CPT

## 2025-01-14 PROCEDURE — 74018 RADEX ABDOMEN 1 VIEW: CPT

## 2025-01-14 PROCEDURE — 85018 HEMOGLOBIN: CPT

## 2025-01-14 PROCEDURE — 6370000000 HC RX 637 (ALT 250 FOR IP): Performed by: FAMILY MEDICINE

## 2025-01-14 PROCEDURE — 71045 X-RAY EXAM CHEST 1 VIEW: CPT

## 2025-01-14 PROCEDURE — 2500000003 HC RX 250 WO HCPCS: Performed by: FAMILY MEDICINE

## 2025-01-14 PROCEDURE — 85610 PROTHROMBIN TIME: CPT

## 2025-01-14 PROCEDURE — 0DH67UZ INSERTION OF FEEDING DEVICE INTO STOMACH, VIA NATURAL OR ARTIFICIAL OPENING: ICD-10-PCS | Performed by: FAMILY MEDICINE

## 2025-01-14 PROCEDURE — 82947 ASSAY GLUCOSE BLOOD QUANT: CPT

## 2025-01-14 PROCEDURE — 82140 ASSAY OF AMMONIA: CPT

## 2025-01-14 PROCEDURE — 85730 THROMBOPLASTIN TIME PARTIAL: CPT

## 2025-01-14 PROCEDURE — 2700000000 HC OXYGEN THERAPY PER DAY

## 2025-01-14 PROCEDURE — 6370000000 HC RX 637 (ALT 250 FOR IP): Performed by: INTERNAL MEDICINE

## 2025-01-14 PROCEDURE — 80048 BASIC METABOLIC PNL TOTAL CA: CPT

## 2025-01-14 PROCEDURE — 85014 HEMATOCRIT: CPT

## 2025-01-14 PROCEDURE — 6370000000 HC RX 637 (ALT 250 FOR IP): Performed by: PSYCHIATRY & NEUROLOGY

## 2025-01-14 PROCEDURE — 36415 COLL VENOUS BLD VENIPUNCTURE: CPT

## 2025-01-14 PROCEDURE — 99233 SBSQ HOSP IP/OBS HIGH 50: CPT | Performed by: FAMILY MEDICINE

## 2025-01-14 PROCEDURE — 86900 BLOOD TYPING SEROLOGIC ABO: CPT

## 2025-01-14 RX ORDER — QUETIAPINE FUMARATE 25 MG/1
25 TABLET, FILM COATED ORAL DAILY
Status: DISCONTINUED | OUTPATIENT
Start: 2025-01-15 | End: 2025-01-15 | Stop reason: HOSPADM

## 2025-01-14 RX ORDER — QUETIAPINE FUMARATE 25 MG/1
50 TABLET, FILM COATED ORAL NIGHTLY
Status: DISCONTINUED | OUTPATIENT
Start: 2025-01-14 | End: 2025-01-14

## 2025-01-14 RX ORDER — QUETIAPINE FUMARATE 25 MG/1
50 TABLET, FILM COATED ORAL NIGHTLY
Status: DISCONTINUED | OUTPATIENT
Start: 2025-01-14 | End: 2025-01-15 | Stop reason: HOSPADM

## 2025-01-14 RX ORDER — LACTULOSE 10 G/15ML
10 SOLUTION ORAL 3 TIMES DAILY
Status: DISCONTINUED | OUTPATIENT
Start: 2025-01-14 | End: 2025-01-15 | Stop reason: HOSPADM

## 2025-01-14 RX ADMIN — SODIUM CHLORIDE, PRESERVATIVE FREE 10 ML: 5 INJECTION INTRAVENOUS at 09:28

## 2025-01-14 RX ADMIN — SODIUM CHLORIDE, PRESERVATIVE FREE 10 ML: 5 INJECTION INTRAVENOUS at 20:13

## 2025-01-14 RX ADMIN — METOPROLOL TARTRATE 50 MG: 50 TABLET, FILM COATED ORAL at 09:20

## 2025-01-14 RX ADMIN — HYDRALAZINE HYDROCHLORIDE 25 MG: 25 TABLET ORAL at 05:35

## 2025-01-14 RX ADMIN — ENOXAPARIN SODIUM 40 MG: 100 INJECTION SUBCUTANEOUS at 09:20

## 2025-01-14 RX ADMIN — QUETIAPINE FUMARATE 50 MG: 25 TABLET ORAL at 22:35

## 2025-01-14 RX ADMIN — HYDRALAZINE HYDROCHLORIDE 25 MG: 25 TABLET ORAL at 22:36

## 2025-01-14 RX ADMIN — QUETIAPINE FUMARATE 50 MG: 25 TABLET ORAL at 09:20

## 2025-01-14 RX ADMIN — CEPHALEXIN 500 MG: 500 CAPSULE ORAL at 05:36

## 2025-01-14 RX ADMIN — VALPROIC ACID 500 MG: 250 SOLUTION ORAL at 20:14

## 2025-01-14 RX ADMIN — ASPIRIN 81 MG: 81 TABLET, CHEWABLE ORAL at 09:20

## 2025-01-14 RX ADMIN — MICONAZOLE NITRATE: 20 CREAM TOPICAL at 20:15

## 2025-01-14 RX ADMIN — LACTULOSE 10 G: 20 SOLUTION ORAL at 20:14

## 2025-01-14 RX ADMIN — SODIUM CHLORIDE 40 MG: 9 INJECTION INTRAMUSCULAR; INTRAVENOUS; SUBCUTANEOUS at 17:19

## 2025-01-14 RX ADMIN — METOPROLOL TARTRATE 50 MG: 50 TABLET, FILM COATED ORAL at 20:14

## 2025-01-14 RX ADMIN — CLOPIDOGREL BISULFATE 75 MG: 75 TABLET ORAL at 09:20

## 2025-01-14 RX ADMIN — VALPROIC ACID 500 MG: 250 SOLUTION ORAL at 09:21

## 2025-01-14 RX ADMIN — MICONAZOLE NITRATE: 20 CREAM TOPICAL at 09:28

## 2025-01-14 RX ADMIN — ATORVASTATIN CALCIUM 20 MG: 20 TABLET, FILM COATED ORAL at 09:20

## 2025-01-14 RX ADMIN — CEPHALEXIN 500 MG: 500 CAPSULE ORAL at 22:35

## 2025-01-14 ASSESSMENT — PAIN SCALES - PAIN ASSESSMENT IN ADVANCED DEMENTIA (PAINAD)
CONSOLABILITY: NO NEED TO CONSOLE
FACIALEXPRESSION: SMILING OR INEXPRESSIVE
TOTALSCORE: 0
FACIALEXPRESSION: SMILING OR INEXPRESSIVE
BREATHING: NORMAL
BREATHING: NORMAL
CONSOLABILITY: NO NEED TO CONSOLE
TOTALSCORE: 0
CONSOLABILITY: NO NEED TO CONSOLE
BODYLANGUAGE: RELAXED
TOTALSCORE: 0
FACIALEXPRESSION: SMILING OR INEXPRESSIVE
FACIALEXPRESSION: SMILING OR INEXPRESSIVE
BREATHING: NORMAL
TOTALSCORE: 0
BODYLANGUAGE: RELAXED
CONSOLABILITY: NO NEED TO CONSOLE
BREATHING: NORMAL

## 2025-01-14 ASSESSMENT — PAIN SCALES - GENERAL
PAINLEVEL_OUTOF10: 0

## 2025-01-14 NOTE — PROGRESS NOTES
Nutrition Assessment     Type and Reason for Visit: Reassess    Nutrition Recommendations/Plan:   NPO  Continue Osmolite 1.2 Alec goal rate 70 mL/hr (1680 mL total volume). Water flush 30 mL every 4 hours  Monitor mental status, GI function, weights and labs     Malnutrition Assessment:  Malnutrition Status: At risk for malnutrition    Nutrition Assessment:  Patient is lethargic and groggy today and has not been following commands. Patient continues to receive Osmolite 1.2 Alec at goal rate 70 mL/hr (1680 mL total volume). Patient had two bowel movements overnight. Nutrition goal is to get patient to be alert and calm so that oral intake can be started. Current weight is recorded as 78.4 kg which is a 13.2 kg weight loss compared to previous weight of 91.6 kg taken on 1/7/25. Unsure if current weight is accurate. Monitor mental status, GI function, weights and labs.    Estimated Daily Nutrient Needs:  Energy (kcal):  1995 kcal  (MSJ 1.2 activity factor, 1.1 stress factor) Weight Used for Energy Requirements: Other (91.6 kg on 1/7)     Protein (g):  85-98 gm of protein (1.3-1.5 gm/kg) Weight Used for Protein Requirements: Ideal        Fluid (ml/day):  2364-8872 mL Method Used for Fluid Requirements: 1 ml/kcal    Nutrition Related Findings:   Edema: trace BUE, trace BLE, +1 perineal edema. Dementia. LBM 1/13. Lethargic and groggy. Sundowning, hard of hearing. NG tube feedings      Current Nutrition Therapies:    ADULT TUBE FEEDING; Nasogastric; Standard without Fiber; Continuous; 15; Yes; 15; Q 6 hours; 70; 30; Q 4 hours    Anthropometric Measures:  Height: 167.6 cm (5' 6\")  Current Body Wt: 78 kg (172 lb) (maybe inaccurate)   BMI: 27.8        Nutrition Diagnosis:   Inadequate oral intake related to inadequate protein-energy intake, cognitive or neurological impairment as evidenced by NPO or clear liquid status due to medical condition, nutrition support - enteral nutrition    Nutrition Interventions:   Food and/or  Nutrient Delivery: Continue NPO, Continue Current Tube Feeding  Nutrition Education/Counseling: Education/Counseling not indicated  Coordination of Nutrition Care: Continue to monitor while inpatient       Goals:  Goals: Tolerate nutrition support at goal rate  Type of Goal: Continue current goal  Previous Goal Met: Progressing toward Goal(s)    Nutrition Monitoring and Evaluation:      Food/Nutrient Intake Outcomes: Enteral Nutrition Intake/Tolerance  Physical Signs/Symptoms Outcomes: Biochemical Data, Fluid Status or Edema, Skin, Weight, GI Status    Discharge Planning:    Too soon to determine         Danna MARQUEZN, RDN, LDN  Lead Clinical Dietitian  RD Office Phone [phone]

## 2025-01-14 NOTE — PROGRESS NOTES
Physical Therapy  DATE: 2025    NAME: Jose Robison  MRN: [id number]   : 1935    Patient not seen this date for Physical Therapy due to:      [] Cancel by RN or physician due to:    [] Hemodialysis    [] Critical Lab Value Level     [] Blood transfusion in progress    [] Acute or unstable cardiovascular status   _MAP < 55 or more than >115  _HR < 40 or > 130    [] Acute or unstable pulmonary status   -FiO2 > 60%   _RR < 5 or >40    _O2 sats < 85%    [] Strict Bedrest    [] Off Unit for surgery or procedure    [] Off Unit for testing       [] Pending imaging to R/O fracture    [] Refusal by Patient      [x] Other Attempted to see pt for PT eval this AM. Pt to lethargic to participate in PT eval, not opening eyes or following commands. Discussed with RN that if pt is more alert later in the day to call so we can attempt eval.      [] PT being discontinued at this time. Patient independent. No further needs.     [] PT being discontinued at this time as the patient has been transferred to hospice care. No further needs.      Ni Villalobos, PT

## 2025-01-14 NOTE — PLAN OF CARE
Problem: Safety - Adult  Goal: Free from fall injury  Outcome: Progressing  Flowsheets (Taken 1/13/2025 0900)  Free From Fall Injury: Instruct family/caregiver on patient safety     Problem: Chronic Conditions and Co-morbidities  Goal: Patient's chronic conditions and co-morbidity symptoms are monitored and maintained or improved  Outcome: Progressing  Flowsheets (Taken 1/13/2025 0900)  Care Plan - Patient's Chronic Conditions and Co-Morbidity Symptoms are Monitored and Maintained or Improved:   Monitor and assess patient's chronic conditions and comorbid symptoms for stability, deterioration, or improvement   Update acute care plan with appropriate goals if chronic or comorbid symptoms are exacerbated and prevent overall improvement and discharge   Collaborate with multidisciplinary team to address chronic and comorbid conditions and prevent exacerbation or deterioration     Problem: Discharge Planning  Goal: Discharge to home or other facility with appropriate resources  Outcome: Progressing  Flowsheets (Taken 1/13/2025 0900)  Discharge to home or other facility with appropriate resources:   Identify barriers to discharge with patient and caregiver   Arrange for needed discharge resources and transportation as appropriate   Identify discharge learning needs (meds, wound care, etc)   Refer to discharge planning if patient needs post-hospital services based on physician order or complex needs related to functional status, cognitive ability or social support system   Arrange for interpreters to assist at discharge as needed     Problem: Pain  Goal: Verbalizes/displays adequate comfort level or baseline comfort level  Outcome: Progressing  Flowsheets  Taken 1/13/2025 1300  Verbalizes/displays adequate comfort level or baseline comfort level:   Encourage patient to monitor pain and request assistance   Assess pain using appropriate pain scale   Administer analgesics based on type and severity of pain and evaluate

## 2025-01-14 NOTE — PROGRESS NOTES
End Of Shift Note  [facility]  Summary of shift: Patient has been unresponsive for most of the shift, arouses more when turning for check and changes. Tube feeds held at 1200 for higher residuals, rechecked at 1600 and residuals had a change in appearance.  They were brown and bilious for the 0800.1200 checks and for 1600, residuals turned thin, dark brown/maroon in color with odor. Dk residual check 480mL out,  NG hooked up to suction with additional 300mL out. Dr. Gresham notified of this, GI consulted, H&H order placed, hold tube feeds as well. Dr. Garcia notified of change in status, type&screen, ptt. INR ordered on patient. GI will see patient in the morning. Patient had 2 bm's and 500mL in urine out.     Vitals:    Vitals:    01/14/25 1540 01/14/25 1600 01/14/25 1624 01/14/25 1700   BP:  134/69 134/69 (!) 154/60   Pulse:  74  96   Resp:  20  29   Temp:  98 °F (36.7 °C)     TempSrc:  Axillary     SpO2:  96%  95%   Weight:       Height: 1.676 m (5' 6\")           I&O:   Intake/Output Summary (Last 24 hours) at 1/14/2025 1750  Last data filed at 1/14/2025 1600  Gross per 24 hour   Intake 1781.57 ml   Output 1075 ml   Net 706.57 ml       Resp Status: 2L NC    Ventilator Settings:     / / /     Critical Care IV infusions:   sodium chloride      sodium chloride 5 mL/hr at 01/13/25 2006    dexmedeTOMIDine (PRECEDEX) 400 mcg in sodium chloride 0.9 % 100 mL infusion Stopped (01/13/25 1838)        LDA:   NG/OG/NJ/NE Tube Nasogastric 16 fr Right nostril (Active)   Number of days: 4       Urinary Catheter 01/07/25 Levine;Coude (Active)   Number of days: 7       Midline 01/08/25 Right Basilic (Active)   Number of days: 6       Wound 01/06/25 Pretibial Left;Anterior (Active)   Number of days: 7

## 2025-01-14 NOTE — PROGRESS NOTES
End Of Shift Note  [facility]  Summary of shift: Patient was lethargic on Precedex drip at 0.4 mcq/hr at being of shift. Patient was confused, hallucinating, reaching up for things that were not there. Patient not able to answer any orientation question or yes or no answers. All verbal communication was mumbled. Patient did not follow any commands. Patient fidgety and intermittently trying to pull at NG that was brideled. Tube feed running at 70 ml/hr of Standard with out fiber which is goal and tolerating with residuals under 75 ml all day. Patient had 1 loose/watery brown bowel movement. QTC was between 450-490 through out day, So Dr. Garcia did not increase the Seroquel but instead added Depakote 500 mg BID to help with combativeness. Patient has indwelling urinary catheter due to retention and had 750 ml urine output this shift. Dressing on Wound on left lower leg was cleansed with saline, had adaptic oil emulsion gauze, covered with ABD, kerlex and acewrap on night shift and is schedule to be changed EOD per wound care orders. Patient was receiving Ancef IVPB antibiotics but was changed to Cephalexin 500 mg orally TID.     Vitals:    Vitals:    06/14/22 0351 06/14/22 0400 06/14/22 0500 06/14/22 0600   BP:  127/79 117/75 127/74   Pulse: 82 82 82 78   Resp: 17 17 20 28   Temp:  97.3 °F (36.3 °C)     TempSrc:  Temporal     SpO2: 95% 94% 91% 95%   Weight:       Height:            I&O:     Intake/Output Summary (Last 24 hours) at 6/14/2022 0713  Last data filed at 6/14/2022 0600  Gross per 24 hour   Intake 2107.03 ml   Output 3402.3 ml   Net -1295.27 ml       Resp Status: Patient oxygen saturation remained above 92% most of shift on room air.     Ventilator Settings:none      Critical Care IV infusions:   PN-Adult 2-in-1 Central Line (Standard) 75 mL/hr at 06/13/22 2129    sodium chloride      sodium chloride      norepinephrine Stopped (06/10/22 1417)    sodium chloride      dextrose      sodium chloride 15  mL/hr at 06/13/22 2041        LDA:   PICC Double Lumen 05/31/22 Left Basilic (Active)   Number of days: 13       CVC Triple Lumen 06/10/22 Right Internal jugular (Active)   Number of days: 3       Closed/Suction Drain Right RLQ Bulb (Active)   Number of days: 12       NG/OG/NJ/NE Tube Nasogastric 14 fr Right nostril (Active)   Number of days: 5       External Urinary Catheter (Active)   Number of days: 4       Incision 06/01/22 Abdomen Lower;Medial (Active)   Number of days: 13

## 2025-01-14 NOTE — PLAN OF CARE
Problem: Safety - Adult  Goal: Free from fall injury  1/13/2025 2237 by Lisa Hoang RN  Outcome: Progressing  1/13/2025 2236 by Lisa Hoang RN  Outcome: Progressing  Flowsheets (Taken 1/13/2025 2014)  Free From Fall Injury:   Based on caregiver fall risk screen, instruct family/caregiver to ask for assistance with transferring infant if caregiver noted to have fall risk factors   Instruct family/caregiver on patient safety  1/13/2025 2009 by Lisa Hoang RN  Outcome: Progressing  1/13/2025 2009 by Lola Brand RN  Outcome: Progressing  Flowsheets (Taken 1/13/2025 0900)  Free From Fall Injury: Instruct family/caregiver on patient safety     Problem: Chronic Conditions and Co-morbidities  Goal: Patient's chronic conditions and co-morbidity symptoms are monitored and maintained or improved  1/13/2025 2237 by Lisa Hoang RN  Outcome: Progressing  1/13/2025 2236 by Lisa Hoang RN  Outcome: Progressing  1/13/2025 2009 by Lisa Hoang RN  Outcome: Progressing  1/13/2025 2009 by Lola Brand RN  Outcome: Progressing  Flowsheets (Taken 1/13/2025 0900)  Care Plan - Patient's Chronic Conditions and Co-Morbidity Symptoms are Monitored and Maintained or Improved:   Monitor and assess patient's chronic conditions and comorbid symptoms for stability, deterioration, or improvement   Update acute care plan with appropriate goals if chronic or comorbid symptoms are exacerbated and prevent overall improvement and discharge   Collaborate with multidisciplinary team to address chronic and comorbid conditions and prevent exacerbation or deterioration     Problem: Discharge Planning  Goal: Discharge to home or other facility with appropriate resources  1/13/2025 2237 by Lisa Hoang RN  Outcome: Progressing  1/13/2025 2236 by Lisa Hoang RN  Outcome: Progressing  1/13/2025 2009 by Lisa Hoang RN  Outcome: Progressing  1/13/2025 2009 by Lola Brand RN  Outcome:  Progressing  Flowsheets (Taken 1/13/2025 0900)  Discharge to home or other facility with appropriate resources:   Identify barriers to discharge with patient and caregiver   Arrange for needed discharge resources and transportation as appropriate   Identify discharge learning needs (meds, wound care, etc)   Refer to discharge planning if patient needs post-hospital services based on physician order or complex needs related to functional status, cognitive ability or social support system   Arrange for interpreters to assist at discharge as needed     Problem: Pain  Goal: Verbalizes/displays adequate comfort level or baseline comfort level  1/13/2025 2237 by Lisa Hoang RN  Outcome: Progressing  1/13/2025 2236 by Lisa Hoang RN  Outcome: Progressing  1/13/2025 2009 by Lisa Hoang RN  Outcome: Progressing  1/13/2025 2009 by Lola Brand RN  Outcome: Progressing  Flowsheets  Taken 1/13/2025 1300  Verbalizes/displays adequate comfort level or baseline comfort level:   Encourage patient to monitor pain and request assistance   Assess pain using appropriate pain scale   Administer analgesics based on type and severity of pain and evaluate response   Implement non-pharmacological measures as appropriate and evaluate response   Consider cultural and social influences on pain and pain management   Notify Licensed Independent Practitioner if interventions unsuccessful or patient reports new pain  Taken 1/13/2025 0900  Verbalizes/displays adequate comfort level or baseline comfort level:   Encourage patient to monitor pain and request assistance   Assess pain using appropriate pain scale   Administer analgesics based on type and severity of pain and evaluate response   Implement non-pharmacological measures as appropriate and evaluate response   Consider cultural and social influences on pain and pain management   Notify Licensed Independent Practitioner if interventions unsuccessful or patient reports

## 2025-01-14 NOTE — PLAN OF CARE
Problem: Safety - Adult  Goal: Free from fall injury  1/13/2025 2009 by Lisa Hoang RN  Outcome: Progressing  1/13/2025 2009 by Lola Brand RN  Outcome: Progressing  Flowsheets (Taken 1/13/2025 0900)  Free From Fall Injury: Instruct family/caregiver on patient safety     Problem: Chronic Conditions and Co-morbidities  Goal: Patient's chronic conditions and co-morbidity symptoms are monitored and maintained or improved  1/13/2025 2009 by Lisa Hoang RN  Outcome: Progressing  1/13/2025 2009 by Lola Brand, RN  Outcome: Progressing  Flowsheets (Taken 1/13/2025 0900)  Care Plan - Patient's Chronic Conditions and Co-Morbidity Symptoms are Monitored and Maintained or Improved:   Monitor and assess patient's chronic conditions and comorbid symptoms for stability, deterioration, or improvement   Update acute care plan with appropriate goals if chronic or comorbid symptoms are exacerbated and prevent overall improvement and discharge   Collaborate with multidisciplinary team to address chronic and comorbid conditions and prevent exacerbation or deterioration     Problem: Discharge Planning  Goal: Discharge to home or other facility with appropriate resources  1/13/2025 2009 by Lisa Hoang RN  Outcome: Progressing  1/13/2025 2009 by Lola Brand RN  Outcome: Progressing  Flowsheets (Taken 1/13/2025 0900)  Discharge to home or other facility with appropriate resources:   Identify barriers to discharge with patient and caregiver   Arrange for needed discharge resources and transportation as appropriate   Identify discharge learning needs (meds, wound care, etc)   Refer to discharge planning if patient needs post-hospital services based on physician order or complex needs related to functional status, cognitive ability or social support system   Arrange for interpreters to assist at discharge as needed     Problem: Pain  Goal: Verbalizes/displays adequate comfort level or baseline comfort

## 2025-01-14 NOTE — PROGRESS NOTES
End Of Shift Note  [facility]  Summary of shift: Sitter still bedside; patient was less combative over night. Patient was lethargic over night. Patient had 2 BM over night.    Vitals:    Vitals:    01/14/25 0500 01/14/25 0502 01/14/25 0535 01/14/25 0600   BP: (!) 163/119  (!) 163/119 (!) 161/65   Pulse: 92   88   Resp: (!) 35   19   Temp:       TempSrc:       SpO2: (!) 87% 92%  94%   Weight:       Height:            I&O:   Intake/Output Summary (Last 24 hours) at 1/14/2025 0722  Last data filed at 1/14/2025 0542  Gross per 24 hour   Intake 1921.57 ml   Output 1975 ml   Net -53.43 ml       Resp Status: Room air    Ventilator Settings:     / / /     Critical Care IV infusions:   sodium chloride      sodium chloride 5 mL/hr at 01/13/25 2006    dexmedeTOMIDine (PRECEDEX) 400 mcg in sodium chloride 0.9 % 100 mL infusion Stopped (01/13/25 1838)        LDA:   NG/OG/NJ/NE Tube Nasogastric 16 fr Right nostril (Active)   Number of days: 3       Urinary Catheter 01/07/25 Levine;Coude (Active)   Number of days: 7       Midline 01/08/25 Right Basilic (Active)   Number of days: 5       Wound 01/06/25 Pretibial Left;Anterior (Active)   Number of days: 7

## 2025-01-14 NOTE — PLAN OF CARE
Problem: Safety - Adult  Goal: Free from fall injury  1/14/2025 1029 by Mia Freeman RN  Outcome: Progressing     Problem: Chronic Conditions and Co-morbidities  Goal: Patient's chronic conditions and co-morbidity symptoms are monitored and maintained or improved  1/14/2025 1029 by Mia Freeman RN  Outcome: Progressing     Problem: Discharge Planning  Goal: Discharge to home or other facility with appropriate resources  1/14/2025 1029 by Mia Freeman RN  Outcome: Progressing     Problem: Pain  Goal: Verbalizes/displays adequate comfort level or baseline comfort level  1/14/2025 1029 by Mia Freeman RN  Outcome: Progressing     Problem: Skin/Tissue Integrity  Goal: Absence of new skin breakdown  Description: 1.  Monitor for areas of redness and/or skin breakdown  2.  Assess vascular access sites hourly  3.  Every 4-6 hours minimum:  Change oxygen saturation probe site  4.  Every 4-6 hours:  If on nasal continuous positive airway pressure, respiratory therapy assess nares and determine need for appliance change or resting period.  1/14/2025 1029 by Mia Freeman RN  Outcome: Progressing     Problem: ABCDS Injury Assessment  Goal: Absence of physical injury  1/14/2025 1029 by Mia Freeman RN  Outcome: Progressing

## 2025-01-14 NOTE — PROGRESS NOTES
Progress Note    Subjective:  Residual bloody gastric residual feed notified by RN   congestive heart failure  Cellulitis left lower extremity  Dementia with psychosis  NG tube feed  Suspected sleep  The patient is awake and alert.  No problems overnight.  Denies chest pain, angina, and dyspnea.  Denies abdominal pain.  Tolerating diet.  No nausea or vomiting.    Admit Date:  1/6/2025    Patient Seen, Chart, Labs, Radiology studies, and Consults reviewed.    Objective:   /69   Pulse (!) 111   Temp 98 °F (36.7 °C)   Resp 25   Ht 1.676 m (5' 6\")   Wt 78.4 kg (172 lb 13.5 oz)   SpO2 94%   BMI 27.90 kg/m²     Intake/Output Summary (Last 24 hours) at 1/14/2025 1629  Last data filed at 1/14/2025 1200  Gross per 24 hour   Intake 1841.57 ml   Output 1075 ml   Net 766.57 ml     General appearance - alert, well appearing, and in no distress and oriented to person, place, and time  Heart:  RRR, no murmurs, gallops, or rubs, extrasystoles.  Lungs:  CTA bilaterally, no wheeze, rales or rhonchi, good air entry, good respiratory effort  Abd: bowel sounds present, nontender, nondistended, no masses, no rigidity, no guarding, no peritoneal signs.  Integumentary: Skin good color, good turgor, no rashes, no acute lesions  Upper Extrem:  No clubbing bilateral hands, no cyanosis or edema  Lower Extrem:no cyanosis or edema, no calf tenderness to lower extremities  Neurological - alert, oriented, normal speech, no focal findings or movement disorder noted, neck supple without rigidity, motor and sensory grossly normal bilaterally      Medications:    [START ON 1/15/2025] QUEtiapine  25 mg Oral Daily    QUEtiapine  50 mg Oral Nightly    lactulose  10 g Oral TID    metoprolol tartrate  50 mg Oral BID    hydrALAZINE  25 mg Oral 3 times per day    valproic acid  500 mg Per NG tube BID    cephALEXin  500 mg Oral 3 times per day    aspirin  81 mg Oral Daily    bumetanide  1 mg Oral Once per day on Monday Wednesday Friday

## 2025-01-14 NOTE — PROGRESS NOTES
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  He off Precedex now completely unresponsive.  Tolerates tube feeds.  He had been bedridden.  Sitter at bedside.  Tolerates tube feeds.    Examination:    Vitals: /68   Pulse 78   Temp 97.7 °F (36.5 °C) (Axillary)   Resp 23   Ht 1.676 m (5' 6\")   Wt 78.4 kg (172 lb 13.5 oz)   SpO2 97%   BMI 27.90 kg/m²   SpO2  Av.1 %  Min: 85 %  Max: 100 %  General appearance: Unresponsive off pressors  Neck: No JVD  Lungs: Decreased breath sound no crackles or wheezing  Heart: regular rate and rhythm, S1, S2 normal, no gallop  Abdomen: Soft, non tender, + BS  Extremities: no cyanosis or clubbing. No significant edema left lower extremity wound/burn dressing clean    LABs:    CBC:   Recent Labs     25  0428 25  0307   WBC 11.8* 13.8*   HGB 12.9* 12.6*   HCT 41.6 40.8    241     BMP:   Recent Labs     25  1434 25  0307    136   K 4.2 4.7   CO2 32* 30   BUN 28* 30*   CREATININE 1.1 1.0   LABGLOM 66 74   GLUCOSE 128* 144*     PT/INR: No results for input(s): \"PROTIME\", \"INR\" in the last 72 hours.  APTT:No results for input(s): \"APTT\" in the last 72 hours.  LIVER PROFILE:  Recent Labs     25  1434 25  0307   AST 45 48   ALT 7* 25        Latest Reference Range & Units 25 15:10   NT Pro-BNP 0 - 450 pg/mL 4,715 (H)   (H): Data is abnormally high   Latest Reference Range & Units 01/10/25 15:55   NT Pro-BNP 0 - 450 pg/mL 4,320 (H)   (H): Data is abnormally high  Radiology:  Chest x-ray   1. Possible slight improvement in central predominant alveolar and  interstitial opacities potentially due to congestive heart failure given mild  cardiomegaly and at least trace bilateral pleural effusions.  Decreased  central vascular crowding due to improved aeration could contribute to the  appearance, and

## 2025-01-14 NOTE — PROGRESS NOTES
Occupational Therapy  [facility]  Occupational Therapy Not Seen Note    Patient not available for Occupational Therapy due to:    [] Testing:    [] Hemodialysis    [] Cancelled by RN:    [] Refusal by Patient:    [] Surgery:     [] Intubation:     [] Pain Medication:    [] Sedation:     [] Spine Precautions :    [] Medical Instability:    [x] Other: Attempted to see pt for OT eval this AM. Pt to lethargic to participate in OT eval, not opening eyes or following commands. Discussed with RN that if pt is more alert later in the day to call so we can attempt eval.     Pastora CABRALES, OT

## 2025-01-15 ENCOUNTER — APPOINTMENT (OUTPATIENT)
Dept: CT IMAGING | Age: 89
DRG: 291 | End: 2025-01-15
Payer: MEDICARE

## 2025-01-15 ENCOUNTER — APPOINTMENT (OUTPATIENT)
Dept: GENERAL RADIOLOGY | Age: 89
DRG: 291 | End: 2025-01-15
Payer: MEDICARE

## 2025-01-15 VITALS
TEMPERATURE: 97.5 F | SYSTOLIC BLOOD PRESSURE: 71 MMHG | OXYGEN SATURATION: 84 % | DIASTOLIC BLOOD PRESSURE: 55 MMHG | BODY MASS INDEX: 27.78 KG/M2 | HEIGHT: 66 IN | WEIGHT: 172.84 LBS

## 2025-01-15 LAB
ALLEN TEST: POSITIVE
ANION GAP SERPL CALCULATED.3IONS-SCNC: 10 MMOL/L (ref 9–16)
BASOPHILS # BLD: 0.03 K/UL (ref 0–0.2)
BASOPHILS NFR BLD: 0 % (ref 0–2)
BUN SERPL-MCNC: 37 MG/DL (ref 8–23)
CALCIUM SERPL-MCNC: 9.7 MG/DL (ref 8.8–10.2)
CHLORIDE SERPL-SCNC: 97 MMOL/L (ref 98–107)
CO2 SERPL-SCNC: 35 MMOL/L (ref 20–31)
CREAT SERPL-MCNC: 1.6 MG/DL (ref 0.7–1.2)
EOSINOPHIL # BLD: <0.03 K/UL (ref 0–0.44)
EOSINOPHILS RELATIVE PERCENT: 0 % (ref 1–4)
ERYTHROCYTE [DISTWIDTH] IN BLOOD BY AUTOMATED COUNT: 16.5 % (ref 11.8–14.4)
FIO2: 15
GFR, ESTIMATED: 42 ML/MIN/1.73M2
GLUCOSE BLD-MCNC: 174 MG/DL (ref 75–110)
GLUCOSE SERPL-MCNC: 171 MG/DL (ref 82–115)
HCT VFR BLD AUTO: 43.2 % (ref 40.7–50.3)
HGB BLD-MCNC: 12.6 G/DL (ref 13–17)
IMM GRANULOCYTES # BLD AUTO: 0.15 K/UL (ref 0–0.3)
IMM GRANULOCYTES NFR BLD: 1 %
LYMPHOCYTES NFR BLD: 2.11 K/UL (ref 1.1–3.7)
LYMPHOCYTES RELATIVE PERCENT: 13 % (ref 24–43)
MCH RBC QN AUTO: 26.9 PG (ref 25.2–33.5)
MCHC RBC AUTO-ENTMCNC: 29.2 G/DL (ref 28.4–34.8)
MCV RBC AUTO: 92.3 FL (ref 82.6–102.9)
MONOCYTES NFR BLD: 1.49 K/UL (ref 0.1–1.2)
MONOCYTES NFR BLD: 9 % (ref 3–12)
NEUTROPHILS NFR BLD: 76 % (ref 36–65)
NEUTS SEG NFR BLD: 12.02 K/UL (ref 1.5–8.1)
NRBC BLD-RTO: 0 PER 100 WBC
O2 DELIVERY DEVICE: ABNORMAL
PLATELET # BLD AUTO: 263 K/UL (ref 138–453)
PMV BLD AUTO: 9.6 FL (ref 8.1–13.5)
POC HCO3: 43.6 MMOL/L (ref 21–28)
POC O2 SATURATION: 94.2 % (ref 94–98)
POC PCO2: 145.4 MM HG (ref 35–48)
POC PH: 7.08 (ref 7.35–7.45)
POC PO2: 107.4 MM HG (ref 83–108)
POSITIVE BASE EXCESS, ART: 7.2 MMOL/L (ref 0–3)
POTASSIUM SERPL-SCNC: 5.6 MMOL/L (ref 3.7–5.3)
RBC # BLD AUTO: 4.68 M/UL (ref 4.21–5.77)
RBC # BLD: ABNORMAL 10*6/UL
SAMPLE SITE: ABNORMAL
SODIUM SERPL-SCNC: 142 MMOL/L (ref 136–145)
TROPONIN I SERPL HS-MCNC: 92 NG/L (ref 0–22)
WBC OTHER # BLD: 15.8 K/UL (ref 3.5–11.3)

## 2025-01-15 PROCEDURE — 80048 BASIC METABOLIC PNL TOTAL CA: CPT

## 2025-01-15 PROCEDURE — 99239 HOSP IP/OBS DSCHRG MGMT >30: CPT | Performed by: FAMILY MEDICINE

## 2025-01-15 PROCEDURE — 36415 COLL VENOUS BLD VENIPUNCTURE: CPT

## 2025-01-15 PROCEDURE — 2700000000 HC OXYGEN THERAPY PER DAY

## 2025-01-15 PROCEDURE — 6370000000 HC RX 637 (ALT 250 FOR IP): Performed by: PSYCHIATRY & NEUROLOGY

## 2025-01-15 PROCEDURE — 71045 X-RAY EXAM CHEST 1 VIEW: CPT

## 2025-01-15 PROCEDURE — 36600 WITHDRAWAL OF ARTERIAL BLOOD: CPT

## 2025-01-15 PROCEDURE — 2580000003 HC RX 258: Performed by: INTERNAL MEDICINE

## 2025-01-15 PROCEDURE — 84484 ASSAY OF TROPONIN QUANT: CPT

## 2025-01-15 PROCEDURE — 82947 ASSAY GLUCOSE BLOOD QUANT: CPT

## 2025-01-15 PROCEDURE — 6360000002 HC RX W HCPCS: Performed by: INTERNAL MEDICINE

## 2025-01-15 PROCEDURE — 94761 N-INVAS EAR/PLS OXIMETRY MLT: CPT

## 2025-01-15 PROCEDURE — 3E033XZ INTRODUCTION OF VASOPRESSOR INTO PERIPHERAL VEIN, PERCUTANEOUS APPROACH: ICD-10-PCS | Performed by: INTERNAL MEDICINE

## 2025-01-15 PROCEDURE — 93005 ELECTROCARDIOGRAM TRACING: CPT | Performed by: INTERNAL MEDICINE

## 2025-01-15 PROCEDURE — 85025 COMPLETE CBC W/AUTO DIFF WBC: CPT

## 2025-01-15 PROCEDURE — 82803 BLOOD GASES ANY COMBINATION: CPT

## 2025-01-15 PROCEDURE — 2500000003 HC RX 250 WO HCPCS: Performed by: INTERNAL MEDICINE

## 2025-01-15 RX ORDER — FUROSEMIDE 10 MG/ML
40 INJECTION INTRAMUSCULAR; INTRAVENOUS ONCE
Status: DISCONTINUED | OUTPATIENT
Start: 2025-01-15 | End: 2025-01-15 | Stop reason: HOSPADM

## 2025-01-15 RX ORDER — SODIUM CHLORIDE 0.9 % (FLUSH) 0.9 %
10 SYRINGE (ML) INJECTION ONCE
Status: DISCONTINUED | OUTPATIENT
Start: 2025-01-15 | End: 2025-01-15 | Stop reason: HOSPADM

## 2025-01-15 RX ORDER — 0.9 % SODIUM CHLORIDE 0.9 %
80 INTRAVENOUS SOLUTION INTRAVENOUS ONCE
Status: DISCONTINUED | OUTPATIENT
Start: 2025-01-15 | End: 2025-01-15 | Stop reason: HOSPADM

## 2025-01-15 RX ORDER — CALCIUM CHLORIDE 100 MG/ML
1000 INJECTION INTRAVENOUS; INTRAVENTRICULAR ONCE
Status: COMPLETED | OUTPATIENT
Start: 2025-01-15 | End: 2025-01-15

## 2025-01-15 RX ORDER — IOPAMIDOL 755 MG/ML
75 INJECTION, SOLUTION INTRAVASCULAR
Status: DISCONTINUED | OUTPATIENT
Start: 2025-01-15 | End: 2025-01-15 | Stop reason: HOSPADM

## 2025-01-15 RX ORDER — MORPHINE SULFATE 4 MG/ML
4 INJECTION, SOLUTION INTRAMUSCULAR; INTRAVENOUS
Status: DISCONTINUED | OUTPATIENT
Start: 2025-01-15 | End: 2025-01-15 | Stop reason: HOSPADM

## 2025-01-15 RX ORDER — NOREPINEPHRINE BITARTRATE 0.06 MG/ML
1-100 INJECTION, SOLUTION INTRAVENOUS CONTINUOUS
Status: DISCONTINUED | OUTPATIENT
Start: 2025-01-15 | End: 2025-01-15 | Stop reason: HOSPADM

## 2025-01-15 RX ADMIN — MORPHINE SULFATE 4 MG: 4 INJECTION, SOLUTION INTRAMUSCULAR; INTRAVENOUS at 10:56

## 2025-01-15 RX ADMIN — MICONAZOLE NITRATE: 20 CREAM TOPICAL at 09:55

## 2025-01-15 RX ADMIN — NOREPINEPHRINE BITARTRATE 5 MCG/MIN: 64 SOLUTION INTRAVENOUS at 05:30

## 2025-01-15 RX ADMIN — CALCIUM CHLORIDE 1000 MG: 100 INJECTION INTRAVENOUS; INTRAVENTRICULAR at 09:20

## 2025-01-15 RX ADMIN — WATER 60 MG: 1 INJECTION INTRAMUSCULAR; INTRAVENOUS; SUBCUTANEOUS at 05:48

## 2025-01-15 RX ADMIN — VANCOMYCIN HYDROCHLORIDE 1000 MG: 1 INJECTION, POWDER, LYOPHILIZED, FOR SOLUTION INTRAVENOUS at 06:15

## 2025-01-15 RX ADMIN — CALCIUM CHLORIDE INJECTION 1000 MG: 100 INJECTION, SOLUTION INTRAVENOUS at 05:42

## 2025-01-15 ASSESSMENT — PAIN SCALES - PAIN ASSESSMENT IN ADVANCED DEMENTIA (PAINAD)
BREATHING: NORMAL
FACIALEXPRESSION: SMILING OR INEXPRESSIVE
FACIALEXPRESSION: SMILING OR INEXPRESSIVE
BREATHING: NORMAL
TOTALSCORE: 0
CONSOLABILITY: NO NEED TO CONSOLE
BODYLANGUAGE: RELAXED
CONSOLABILITY: NO NEED TO CONSOLE
BODYLANGUAGE: RELAXED
TOTALSCORE: 0

## 2025-01-15 ASSESSMENT — PAIN SCALES - GENERAL
PAINLEVEL_OUTOF10: 0
PAINLEVEL_OUTOF10: 0

## 2025-01-15 NOTE — PROGRESS NOTES
..PALLIATIVE CARE NURSING ASSESSMENT    Patient: Jose Robison  Room: 1110/1110-    Reason For Consult   Goals of care evaluation  Distress management  Guidance and support  Facilitate communications  Assistance in coordinating care    Code Status: DNRCC    PLAN:  -Pt's condition has declined since yesterday. He was started on pressor support and pt became unresponsive  -Family has decided to transition to comfort care only. Pressor was turned off. Pt  not long after.   -Much emotional support offered to family.         Impression: Jose Robison is a 89 y.o. 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  Currently hospitalized for the management of CHF.  The Palliative Care Team is following to assist with goals of care/family support.     Vital Signs  Blood pressure (!) 44/28, pulse 87, temperature 97.5 °F (36.4 °C), temperature source Axillary, resp. rate 16, height 1.676 m (5' 6\"), weight 78.4 kg (172 lb 13.5 oz), SpO2 91%.    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

## 2025-01-15 NOTE — PROGRESS NOTES
Contacted by RN patient had lost pulse and cardiac rhythm.  Came in to evaluate pronounced dead at 12 PM.  Offered condolences to family    Jean Carlos Garcia MD  Pulmonary critical care

## 2025-01-15 NOTE — PROGRESS NOTES
SPIRITUAL CARE DEPARTMENT [facility]   Patient Death Note  DEATH                  Room # 1110/1110-01   Name: Jose Robison            Age: 89 y.o.  Gender: male          Alevism: None      Place of Nondenominational: unknown  Admit Date & Time: 2025  2:34 PM        Actual date of death: 01/15/2025   TOD: 1200       Referral:  Unit: ICU  Nurse: Mia    SITUATION AT DEATH:  (Relevant information to the situation that has led to death). Patient was DNR/CC. Family members were present at bedside grieving appropriately. Present were spouse, daughter and sons.  provided support, comforting touch and prayer.    IS THIS A 'S CASE?  No    SPIRITUAL/EMOTIONAL INTERVENTION:  (Please note all relevant care assessments and interventions and duties performed. Also include important family names, numbers, and dynamics)       DOCTOR SIGNING DEATH CERTIFICATE:  Name: Mp  Phone number: unknown    Copy of COMPLETED Release of Body Form Received?  Yes    Patient's belongings: with family     HOME:  Contacted Time 1300  Name: Jesus Alberto  City: [place]  Phone Number: [phone]    NEXT OF KIN:  Name: Lisa Robison  Relationship: spouse  Street Address: [address]   City: [place]  State: [place]  Zip code: [id number]   Phone Number: [phone]    ANY FOLLOW-UP NEEDED?  No    IF SO, WHAT?  none    Electronically signed by Chaplain Juan, on 1/15/2025 at 1:27 PM.  Spiritual Care Department  [facility]  [phone]

## 2025-01-15 NOTE — PLAN OF CARE
Problem: Safety - Adult  Goal: Free from fall injury  1/14/2025 1916 by Lisa Hoang RN  Outcome: Progressing  Flowsheets (Taken 1/14/2025 1904)  Free From Fall Injury:   Instruct family/caregiver on patient safety   Based on caregiver fall risk screen, instruct family/caregiver to ask for assistance with transferring infant if caregiver noted to have fall risk factors  1/14/2025 1029 by Mia Freeman RN  Outcome: Progressing     Problem: Chronic Conditions and Co-morbidities  Goal: Patient's chronic conditions and co-morbidity symptoms are monitored and maintained or improved  1/14/2025 1916 by Lisa Hoang RN  Outcome: Progressing  1/14/2025 1029 by Mia Freeman RN  Outcome: Progressing     Problem: Discharge Planning  Goal: Discharge to home or other facility with appropriate resources  1/14/2025 1916 by Lisa Hoang RN  Outcome: Progressing  1/14/2025 1029 by Mia Freeman RN  Outcome: Progressing     Problem: Pain  Goal: Verbalizes/displays adequate comfort level or baseline comfort level  1/14/2025 1916 by Lisa Hoang RN  Outcome: Progressing  1/14/2025 1029 by Mia Freeman RN  Outcome: Progressing     Problem: Skin/Tissue Integrity  Goal: Absence of new skin breakdown  Description: 1.  Monitor for areas of redness and/or skin breakdown  2.  Assess vascular access sites hourly  3.  Every 4-6 hours minimum:  Change oxygen saturation probe site  4.  Every 4-6 hours:  If on nasal continuous positive airway pressure, respiratory therapy assess nares and determine need for appliance change or resting period.  1/14/2025 1916 by Lisa Hoang RN  Outcome: Progressing  1/14/2025 1029 by Mia Freeman RN  Outcome: Progressing     Problem: ABCDS Injury Assessment  Goal: Absence of physical injury  1/14/2025 1916 by Lisa Hoang RN  Outcome: Progressing  Flowsheets (Taken 1/14/2025 1904)  Absence of Physical Injury: Implement safety measures based on patient

## 2025-01-15 NOTE — PROGRESS NOTES
End Of Shift Note  [facility]  Summary of shift: Patient had 2 BM during the shift. Patient was very lethargic at start of shift, patient was arousable. Sitter was bedside. Patient was connected to LIWS throughout the shift. Patient was NT suctioned around 0100, d/t unable to clear airway. Around 0300 patient started desatting into 80's; placed on 15L nonrebreather. RT was called bedside.  At 0340 contacted Dr. Garcia updating on patient condition. Spoke with  given  orders for CTA head/chest, ABG, Lasix 40mgx1, MRSA swab, Zosyn, Vanco 1g x1, D/C keflex and  hold any sedation. Updated Dr. Garcia of ABG: pH 7.08 Co2: 145. Dr. Garcia updated the family.  given orders to hold off on CTAs d/t patient being unstable and agonally breathing. At 0518 Contacted  d/t patient BP 73/39 MAP 50 , given orders for Levophed MAP greater than 65, Solumedrol 60mg, Calcium chloride 1gm x1, oral airway placed per RT and to bag patient to help decrease Co2. Wife Lisa, and Son Jose bedside.             Vitals:    Vitals:    01/15/25 0620 01/15/25 0630 01/15/25 0645 01/15/25 0700   BP: (!) 145/72 139/89 131/63 (!) 148/60   Pulse: 95 (!) 123 (!) 113 (!) 103   Resp: 21 24 26 24   Temp:       TempSrc:       SpO2: 100% 100% 100% 100%   Weight:       Height:            I&O:   Intake/Output Summary (Last 24 hours) at 1/15/2025 0744  Last data filed at 1/15/2025 0600  Gross per 24 hour   Intake 60 ml   Output 775 ml   Net -715 ml       Resp Status: At start of shift patient was on  2L NC, 15L Nonbreather.     Ventilator Settings:     / / /     Critical Care IV infusions:   norepinephrine 5 mcg/min (01/15/25 0530)    sodium chloride      sodium chloride 5 mL/hr at 01/13/25 2006    dexmedeTOMIDine (PRECEDEX) 400 mcg in sodium chloride 0.9 % 100 mL infusion Stopped (01/13/25 1788)        LDA:   NG/OG/NJ/NE Tube Nasogastric 16 fr Right nostril (Active)   Number of days: 4       Urinary Catheter 01/07/25

## 2025-01-15 NOTE — PROGRESS NOTES
Spiritual Health History and Assessment/Progress Note  [facility]    (P) Spiritual/Emotional Needs,  , End of Life,      Name: Jose Robison MRN: [id number]    Age: 89 y.o.     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 Patient appeared peaceful and family and was accepting of sacrament of the sick blessing.  to remain available for further spiritual care support as patient declines.    Angelina, Belief, Meaning:   Patient unable to assess at this time  Family/Friends have beliefs or practices that help with coping during difficult times      Importance and Influence:  Patient unable to assess at this time  Family/Friends have spiritual/personal beliefs that influence decisions regarding the patient's health    Community:  Patient feels well-supported. Support system includes: Spouse/Partner, Children, and Extended family  Family/Friends feel well-supported. Support system includes: Other: family members    Assessment and Plan of Care:     Patient Interventions include: supportive pastoral presence  Family/Friends Interventions include: Facilitated expression of thoughts and feelings, Explored spiritual coping/struggle/distress, and Affirmed coping skills/support systems    Patient Plan of Care: Spiritual Care available upon further referral  Family/Friends Plan of Care: Spiritual Care available upon further referral    Electronically signed by Chaplain Juan on 1/15/2025 at 10:43 AM

## 2025-01-15 NOTE — DISCHARGE SUMMARY
Patient passed away this morning  Heart failure with routine ejection fraction  Atrial fibrillation  Upper GI bleed  Dementia with psychosis  Suspected sleep  CASSIA  Consult  Pulmonary critical care, neurology, GI  Detail  89-year-old  male was presented with heart failure with routine ejection fraction.  Cardiology was consulted.  He has been diuresing well in response to Bumex 1 mg 3 times a day  Dementia with psychosis with significant sundowning necessitating sedation.  We also consulted with neurology.  CT scan was negative for any obvious acute event.  He was placed on Seroquel and titrated upward.  He was also placed on Depakote.  Patient was placed on NG tube feed and coordination with dietitian.  Yesterday he was noted to have bloody residuals.  Tube feed was stopped.  Anticoagulation was stopped.  Atrial fibrillation.  Patient on beta-blocker and Plavix.  CASSIA.  Patient on Bumex.  Patient passed away this morning.  His wife son and daughter-in-law present in the room.  Condolences offered  More than 30 minutes spent organizing plan of care

## 2025-01-15 NOTE — PROGRESS NOTES
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  I checked H&H remained stable.  Patient Precedex concern over sedation awaiting performed to recover after decreasing Seroquel.  I was contacted by RN around 3:30 AM regarding patient having sudden desaturation when he was being moved around to be clean.  He required 100% oxygen nonrebreather mask he is on oxygen at 1 to 2 L prior to the event.  He still was unresponsive off any sedation became agonal after that.  I ordered chest x-ray ABGs.  I will CT scan of the brain CTA chest to rule out PE he had significant CO2 retention on ABG.  I discussed with RT.  I had oral airway placed bagging did not help.  Could not initiate BiPAP support given [facility] policy given unresponsiveness.  I started antibiotics broad-spectrum I called and discussed with Flower zaman since his wife's deaf.  NT suction did not help.  Had initially referred for hypotension Orth[facility] PMG spermatic he is still on nonrebreather mask agonal.  Could not undergo CTA brain or CTA chest due to to instability.  I held off Seroquel.  I ordered Levophed for hypotension.  He is on Levophed now up to 13 mcg/min.  Examination:    Vitals: BP (!) 117/57   Pulse 97   Temp 97.5 °F (36.4 °C) (Axillary)   Resp 20   Ht 1.676 m (5' 6\")   Wt 78.4 kg (172 lb 13.5 oz)   SpO2 100%   BMI 27.90 kg/m²   SpO2  Av.5 %  Min: 67 %  Max: 100 %  General appearance: Agonal on nonrebreather mask unresponsive   Neck: No JVD  Lungs: Decreased breath sound no crackles or wheezing  Heart: regular rate and rhythm, S1, S2 normal, no gallop  Abdomen: Soft, non tender, + BS  Extremities: no cyanosis or clubbing. No significant edema left lower extremity wound/burn dressing clean    LABs:    CBC:   Recent Labs     25  0307 25  1740 01/15/25  0440   WBC 13.8*

## 2025-01-15 NOTE — PLAN OF CARE
Problem: Safety - Adult  Goal: Free from fall injury  1/15/2025 0710 by Natali Mancia RN  Outcome: Not Progressing  1/14/2025 1916 by Lisa Hoang RN  Outcome: Progressing  Flowsheets (Taken 1/14/2025 1904)  Free From Fall Injury:   Instruct family/caregiver on patient safety   Based on caregiver fall risk screen, instruct family/caregiver to ask for assistance with transferring infant if caregiver noted to have fall risk factors     Problem: Chronic Conditions and Co-morbidities  Goal: Patient's chronic conditions and co-morbidity symptoms are monitored and maintained or improved  1/15/2025 0710 by Natali Mancia RN  Outcome: Not Progressing  1/14/2025 1916 by Lisa Hoang RN  Outcome: Progressing     Problem: Discharge Planning  Goal: Discharge to home or other facility with appropriate resources  1/15/2025 0710 by Natali Mancia RN  Outcome: Not Progressing  1/14/2025 1916 by Lisa Hoang RN  Outcome: Progressing     Problem: Pain  Goal: Verbalizes/displays adequate comfort level or baseline comfort level  1/15/2025 0710 by Natali Mancia RN  Outcome: Not Progressing  Flowsheets (Taken 1/14/2025 2000 by Lisa Hoang RN)  Verbalizes/displays adequate comfort level or baseline comfort level:   Encourage patient to monitor pain and request assistance   Assess pain using appropriate pain scale   Administer analgesics based on type and severity of pain and evaluate response   Implement non-pharmacological measures as appropriate and evaluate response   Notify Licensed Independent Practitioner if interventions unsuccessful or patient reports new pain  1/14/2025 1916 by Lisa Hoang RN  Outcome: Progressing     Problem: Skin/Tissue Integrity  Goal: Absence of new skin breakdown  Description: 1.  Monitor for areas of redness and/or skin breakdown  2.  Assess vascular access sites hourly  3.  Every 4-6 hours minimum:  Change oxygen saturation probe site  4.  Every 4-6 hours:  If on nasal  adequate comfort level or baseline comfort level:   Encourage patient to monitor pain and request assistance   Assess pain using appropriate pain scale   Administer analgesics based on type and severity of pain and evaluate response   Implement non-pharmacological measures as appropriate and evaluate response   Notify Licensed Independent Practitioner if interventions unsuccessful or patient reports new pain  1/14/2025 1916 by Lisa Hoang RN  Outcome: Progressing     Problem: Skin/Tissue Integrity  Goal: Absence of new skin breakdown  Description: 1.  Monitor for areas of redness and/or skin breakdown  2.  Assess vascular access sites hourly  3.  Every 4-6 hours minimum:  Change oxygen saturation probe site  4.  Every 4-6 hours:  If on nasal continuous positive airway pressure, respiratory therapy assess nares and determine need for appliance change or resting period.  1/15/2025 0710 by Natali Mancia RN  Outcome: Not Progressing  1/14/2025 1916 by Lisa Hoang RN  Outcome: Progressing     Problem: ABCDS Injury Assessment  Goal: Absence of physical injury  1/15/2025 0710 by Natali Mancia RN  Outcome: Not Progressing  1/14/2025 1916 by Lisa Hoang RN  Outcome: Progressing  Flowsheets (Taken 1/14/2025 1904)  Absence of Physical Injury: Implement safety measures based on patient assessment     Problem: Nutrition Deficit:  Goal: Optimize nutritional status  1/15/2025 0710 by Natali Mancia RN  Outcome: Not Progressing  1/14/2025 1916 by Lisa Hoang RN  Outcome: Progressing

## 2025-01-15 NOTE — PROGRESS NOTES
Patient had an eventful night last night.  Family was at bedside with the patient since 0500.  Dr. Garcia notified of family being present and he came in to discuss code status of patient.  Family wished to keep patient comfortable and change the patient's code status to DNR-CC.  Documentation was filled out and sign, placed in patients chart.     On monitor patient went asystole at 1159 am and pronounced at 1200 per Dr. Garcia.     1330-Patient was release by the Coronrer and OPO.  Patient was then bathed and removed of all lines. Dressings applied.    Patient belongings were sent with the patients wife.  Physicians notified of patients passing.

## 2025-01-16 LAB
EKG ATRIAL RATE: 374 BPM
EKG Q-T INTERVAL: 340 MS
EKG QRS DURATION: 94 MS
EKG QTC CALCULATION (BAZETT): 434 MS
EKG R AXIS: -47 DEGREES
EKG T AXIS: 72 DEGREES
EKG VENTRICULAR RATE: 98 BPM

## 2025-01-16 PROCEDURE — 93010 ELECTROCARDIOGRAM REPORT: CPT | Performed by: INTERNAL MEDICINE

## 2025-01-26 NOTE — PROGRESS NOTES
Physician Progress Note      PATIENT:               ASA FELTON  CSN #:                  [id number]  :                       1935  ADMIT DATE:       2025 2:34 PM  DISCH DATE:        1/15/2025 3:02 PM  RESPONDING  PROVIDER #:        Sepideh Gresham MD          QUERY TEXT:    Patient admitted with heart failure. Documentation reflects possible septic   shock in note(s) dated 1/15.  If possible, please document in the progress   notes and discharge summary if septic shock was:    The medical record reflects the following:  Risk Factors:  Left lower extreme cellulitis  Clinical Indicators: 1/15 PN \"Shock possibly septic versus due to significant   hypercarbia/acidosis\"; : He still was unresponsive off any sedation became   agonal after that.  I ordered chest x-ray ABGs.  I will CT scan of the brain   CTA chest to rule out PE he had significant CO2 retention on ABG.  I discussed   with RT.  I had oral airway placed bagging did not help.  Could not initiate   BiPAP support given [facility] policy given unresponsiveness.  I started   antibiotics broad-spectrum I called and discussed with Flower zaman   since his wife's deaf.  NT suction did not help.     WBC 13.7, Pulse 111, Resp 25  1/15 WBC 15.8, Pulse 97, Resp 20  Treatment: IV fluids, Levophed, cephalexin  Options provided:  -- Septic shock confirmed after study, not present on admission  -- Septic shock ruled out after study  -- Other - I will add my own diagnosis  -- Disagree - Not applicable / Not valid  -- Disagree - Clinically unable to determine / Unknown  -- Refer to Clinical Documentation Reviewer    PROVIDER RESPONSE TEXT:    Provider disagreed with this query.  chf excerabation - diastolic dysfunction, copd excedrbation    Query created by: Era Guzman on 2025 8:18 AM      Electronically signed by:  Sepideh Gresham MD 2025 11:21 AM
